# Patient Record
Sex: FEMALE | Race: WHITE | NOT HISPANIC OR LATINO | Employment: PART TIME | URBAN - METROPOLITAN AREA
[De-identification: names, ages, dates, MRNs, and addresses within clinical notes are randomized per-mention and may not be internally consistent; named-entity substitution may affect disease eponyms.]

---

## 2017-01-30 ENCOUNTER — OFFICE VISIT (OUTPATIENT)
Dept: FAMILY MEDICINE | Facility: OTHER | Age: 57
End: 2017-01-30
Payer: COMMERCIAL

## 2017-01-30 ENCOUNTER — TELEPHONE (OUTPATIENT)
Dept: FAMILY MEDICINE | Facility: OTHER | Age: 57
End: 2017-01-30

## 2017-01-30 VITALS
TEMPERATURE: 98.3 F | SYSTOLIC BLOOD PRESSURE: 110 MMHG | HEIGHT: 64 IN | WEIGHT: 120.8 LBS | RESPIRATION RATE: 16 BRPM | BODY MASS INDEX: 20.62 KG/M2 | DIASTOLIC BLOOD PRESSURE: 68 MMHG | OXYGEN SATURATION: 97 % | HEART RATE: 86 BPM

## 2017-01-30 DIAGNOSIS — M25.561 ACUTE PAIN OF RIGHT KNEE: ICD-10-CM

## 2017-01-30 DIAGNOSIS — M54.2 NECK PAIN: ICD-10-CM

## 2017-01-30 DIAGNOSIS — J20.9 ACUTE BRONCHITIS, UNSPECIFIED ORGANISM: Primary | ICD-10-CM

## 2017-01-30 PROCEDURE — 99214 OFFICE O/P EST MOD 30 MIN: CPT | Performed by: PHYSICIAN ASSISTANT

## 2017-01-30 RX ORDER — ACETAMINOPHEN AND CODEINE PHOSPHATE 300; 30 MG/1; MG/1
1-2 TABLET ORAL EVERY 4 HOURS PRN
Qty: 28 TABLET | Refills: 0 | Status: SHIPPED | OUTPATIENT
Start: 2017-01-30 | End: 2017-07-24

## 2017-01-30 RX ORDER — AZITHROMYCIN 250 MG/1
TABLET, FILM COATED ORAL
Qty: 6 TABLET | Refills: 0 | Status: SHIPPED | OUTPATIENT
Start: 2017-01-30 | End: 2017-02-06

## 2017-01-30 RX ORDER — CODEINE PHOSPHATE AND GUAIFENESIN 10; 100 MG/5ML; MG/5ML
1 SOLUTION ORAL EVERY 4 HOURS PRN
Qty: 120 ML | Refills: 0 | Status: SHIPPED | OUTPATIENT
Start: 2017-01-30 | End: 2017-07-24

## 2017-01-30 ASSESSMENT — PAIN SCALES - GENERAL: PAINLEVEL: SEVERE PAIN (7)

## 2017-01-30 NOTE — TELEPHONE ENCOUNTER
Reason for call:  Same Day Appointment  Reason for Call:  Same Day Appointment, Requested Provider:  any    PCP: Robyn Chung    Reason for visit: cold cough    Duration of symptoms: 3 weeks    Have you been treated for this in the past? No    Additional comments: is wondering if she could be worked into [facility] today or tomorrow declined another location    Can we leave a detailed message on this number? YES    Phone number patient can be reached at: Home number on file [phone] (home)     Best Time: any    Call taken on 1/30/2017 at 11:49 AM by Muna vEans

## 2017-01-30 NOTE — MR AVS SNAPSHOT
"              After Visit Summary   2017    Kandice Morton    MRN: [id number]           Patient Information     Date Of Birth          1960        Visit Information        Provider Department      2017 2:30 PM Robyn Chung PA-C [facility]        Today's Diagnoses     Acute bronchitis, unspecified organism    -  1        Follow-ups after your visit        Who to contact     If you have questions or need follow up information about today's clinic visit or your schedule please contact [facility] directly at [phone].  Normal or non-critical lab and imaging results will be communicated to you by Camp[employer], letter or phone within 4 business days after the clinic has received the results. If you do not hear from us within 7 days, please contact the clinic through Camp[employer] or phone. If you have a critical or abnormal lab result, we will notify you by phone as soon as possible.  Submit refill requests through [employer] or call your pharmacy and they will forward the refill request to us. Please allow 3 business days for your refill to be completed.          Additional Information About Your Visit        MyChart Information     [employer] lets you send messages to your doctor, view your test results, renew your prescriptions, schedule appointments and more. To sign up, go to [email or web address] . Click on \"Log in\" on the left side of the screen, which will take you to the Welcome page. Then click on \"Sign up Now\" on the right side of the page.     You will be asked to enter the access code listed below, as well as some personal information. Please follow the directions to create your username and password.     Your access code is: [email or web address]  Expires: 2017  3:03 PM     Your access code will  in 90 days. If you need help or a new code, please call your [facility] or [phone].        Care EveryWhere ID     This is your Care EveryWhere ID. This could be " "used by other organizations to access your [facility] medical records  [id number]        Your Vitals Were     Pulse Temperature Respirations Height BMI (Body Mass Index) Pulse Oximetry    86 98.3  F (36.8  C) (Oral) 16 5' 3.74\" (1.619 m) 20.90 kg/m2 97%    Last Period                   02/01/2006            Blood Pressure from Last 3 Encounters:   01/30/17 110/68   06/13/16 110/74   07/06/15 110/64    Weight from Last 3 Encounters:   01/30/17 120 lb 12.8 oz (54.795 kg)   07/18/16 117 lb (53.071 kg)   06/13/16 116 lb 4.8 oz (52.753 kg)              Today, you had the following     No orders found for display         Today's Medication Changes          These changes are accurate as of: 1/30/17  3:03 PM.  If you have any questions, ask your nurse or doctor.               Start taking these medicines.        Dose/Directions    azithromycin 250 MG tablet   Commonly known as:  ZITHROMAX   Used for:  Acute bronchitis, unspecified organism   Started by:  Robyn Chung PA-C        Two tablets first day, then one tablet daily for four days.   Quantity:  6 tablet   Refills:  0       guaiFENesin-codeine 100-10 MG/5ML Soln solution   Commonly known as:  ROBITUSSIN AC   Used for:  Acute bronchitis, unspecified organism   Started by:  Robyn Chung PA-C        Dose:  1 tsp.   Take 5 mLs by mouth every 4 hours as needed for cough   Quantity:  120 mL   Refills:  0            Where to get your medicines      These medications were sent to [facility] [address] [place] [address]  [address] [place] [address]     Phone:  [phone]    - azithromycin 250 MG tablet      Some of these will need a paper prescription and others can be bought over the counter.  Ask your nurse if you have questions.     Bring a paper prescription for each of these medications    - guaiFENesin-codeine 100-10 MG/5ML Soln solution             Primary Care Provider Office Phone # Fax #    Robyn Chung PA-C " [phone]       [facility] [address] [place] [address]        Thank you!     Thank you for choosing [facility]  for your care. Our goal is always to provide you with excellent care. Hearing back from our patients is one way we can continue to improve our services. Please take a few minutes to complete the written survey that you may receive in the mail after your visit with us. Thank you!             Your Updated Medication List - Protect others around you: Learn how to safely use, store and throw away your medicines at www.disposemymeds.org.          This list is accurate as of: 1/30/17  3:03 PM.  Always use your most recent med list.                   Brand Name Dispense Instructions for use    acetaminophen-codeine 300-30 MG per tablet    TYLENOL w/CODEINE No. 3    28 tablet    Take 1-2 tablets by mouth every 4 hours as needed for mild pain       ASPIRIN NOT PRESCRIBED    INTENTIONAL    1 each    Antiplatelet medication not prescribed intentionally due to Hx of gastrointestinal or intracranial bleed       azithromycin 250 MG tablet    ZITHROMAX    6 tablet    Two tablets first day, then one tablet daily for four days.       guaiFENesin-codeine 100-10 MG/5ML Soln solution    ROBITUSSIN AC    120 mL    Take 5 mLs by mouth every 4 hours as needed for cough

## 2017-01-30 NOTE — PROGRESS NOTES
"  SUBJECTIVE:                                                    Kandice Morton is a 56 year old female who presents to clinic today for the following health issues:      Acute Illness   Acute illness concerns: URI  Onset: 3-4 weeks     Fever: no    Chills/Sweats: YES    Headache (location?): no    Sinus Pressure:no    Conjunctivitis:  no    Ear Pain: no    Rhinorrhea: YES, this has cleared up now , all she really has left per pt is a cough    Congestion: YES    Sore Throat: no     Cough: YES-non-productive- no SOB    Wheeze: no    Decreased Appetite: YES    Nausea: no    Vomiting: no    Diarrhea:  YES- a bit on occasion    Dysuria/Freq.: no    Fatigue/Achiness: no    Sick/Strep Exposure: Grandchildren who were on antibiotics     Therapies Tried and outcome: naproxen, ibuprofen, tylenol      She is also complaining of knee pain, right.  This started a few weeks ago, she is treating her foot pain as recommended by podiatry.  She is wearing better shoes now and wonders if this is why her knee hurts.  She did have a previous injury way back but is not sure if it was her left of right knee that was injured in a skiing accident     Problem list and histories reviewed & adjusted, as indicated.  Additional history: as documented, also she is requesting her tylenol with codiene be refilled she still has several left from her rx in June very rarely uses it for her neck pain.    BP Readings from Last 3 Encounters:   01/30/17 110/68   06/13/16 110/74   07/06/15 110/64    Wt Readings from Last 3 Encounters:   01/30/17 120 lb 12.8 oz (54.795 kg)   07/18/16 117 lb (53.071 kg)   06/13/16 116 lb 4.8 oz (52.753 kg)                  Labs reviewed in [facility]    ROS:  As documented above     OBJECTIVE:                                                    /68 mmHg  Pulse 86  Temp(Src) 98.3  F (36.8  C) (Oral)  Resp 16  Ht 5' 3.74\" (1.619 m)  Wt 120 lb 12.8 oz (54.795 kg)  BMI 20.90 kg/m2  SpO2 97%  LMP 02/01/2006  Body mass " index is 20.9 kg/(m^2).  GENERAL: healthy, alert and no distress  EYES: Eyes grossly normal to inspection  HENT: ear canals and TM's normal, nose and mouth without ulcers or lesions  NECK: no adenopathy, no asymmetry, masses, or scars and thyroid normal to palpation  RESP: lungs clear to auscultation - no rales, rhonchi or wheezes  CV: regular rate and rhythm, normal S1 S2, no S3 or S4, no murmur, click or rub, no peripheral edema and peripheral pulses strong  ABDOMEN: soft, nontender, no hepatosplenomegaly, no masses and bowel sounds normal  MS: no gross musculoskeletal defects noted, no edema    Diagnostic Test Results:  none      ASSESSMENT/PLAN:                                                        1. Acute bronchitis, unspecified organism  Fluids, rest, if not better do CXR though her lungs were clear today so it was not done   - guaiFENesin-codeine (ROBITUSSIN AC) 100-10 MG/5ML SOLN solution; Take 5 mLs by mouth every 4 hours as needed for cough  Dispense: 120 mL; Refill: 0  - azithromycin (ZITHROMAX) 250 MG tablet; Two tablets first day, then one tablet daily for four days.  Dispense: 6 tablet; Refill: 0    2. Neck pain  If needing more frequent refills will need to do narc lisseth, urine test, etc  - acetaminophen-codeine (TYLENOL W/CODEINE NO. 3) 300-30 MG per tablet; Take 1-2 tablets by mouth every 4 hours as needed for mild pain  Dispense: 28 tablet; Refill: 0    3. Acute pain of right knee  Pt will wait to see how her knee adjusts to her new shoes, she thinks her foot pain and knee pain are related, it not improving will refer to sports med           Robyn Chung PA-C  [facility]  Electronically signed by Robyn Chung PA-C

## 2017-01-30 NOTE — NURSING NOTE
"Chief Complaint   Patient presents with     URI       Initial /68 mmHg  Pulse 86  Temp(Src) 98.3  F (36.8  C) (Oral)  Resp 16  Ht 5' 3.74\" (1.619 m)  Wt 120 lb 12.8 oz (54.795 kg)  BMI 20.90 kg/m2  SpO2 97%  LMP 02/01/2006 Estimated body mass index is 20.9 kg/(m^2) as calculated from the following:    Height as of this encounter: 5' 3.74\" (1.619 m).    Weight as of this encounter: 120 lb 12.8 oz (54.795 kg).  BP completed using cuff size: arjun Smith CMA (AAMA)      "

## 2017-02-02 NOTE — PROGRESS NOTES
"  SUBJECTIVE:                                                    Kandice Morton is a 56 year old female who presents to clinic today for the following health issues:      Joint Pain     Onset: about 2-3 weeks , she thinks it may be related to her foot pain, she wore her new expensive shoes and it make both her knee and her right foot pain worse she has returned her shoes and will be seeing the specialist who makes the orthotics soon.    Description:   Location: right knee  Character: constant    Intensity: mild, moderate    Progression of Symptoms: worse    Accompanying Signs & Symptoms:  Other symptoms: radiation of pain down to ankle and can feel a twitching in one spot   History:   Previous similar pain: no       Precipitating factors:   Trauma or overuse: not recent, many years ago had a skiing accident with a knee injury but is not sure what side was injured, also shortly thereafter she feel down the stairs and may have injured her knee at that point but htat was over 25-30 years ago.    Alleviating factors:  Improved by: nothing       Therapies Tried and outcome: ice pack, naproxen, got a pair of shoes          Problem list and histories reviewed & adjusted, as indicated.  Additional history: as documented    BP Readings from Last 3 Encounters:   02/06/17 106/66   01/30/17 110/68   06/13/16 110/74    Wt Readings from Last 3 Encounters:   02/06/17 124 lb (56.246 kg)   01/30/17 120 lb 12.8 oz (54.795 kg)   07/18/16 117 lb (53.071 kg)                  Labs reviewed in EPIC    ROS:  HEENT and lungs- her cough is mildly improved with use of z pack    OBJECTIVE:                                                    /66 mmHg  Pulse 84  Temp(Src) 98.1  F (36.7  C) (Oral)  Resp 12  Ht 5' 3.74\" (1.619 m)  Wt 124 lb (56.246 kg)  BMI 21.46 kg/m2  LMP 02/01/2006  Body mass index is 21.46 kg/(m^2).  GENERAL: healthy, alert and no distress  RESP: lungs clear to auscultation - no rales, rhonchi or wheezes  CV: " regular rate and rhythm, normal S1 S2, no S3 or S4, no murmur, click or rub, no peripheral edema and peripheral pulses strong  MS: no gross musculoskeletal defects noted, mild lateral joint line area of edema, no joint effusion noted, very tender over lateral joint line, otherwise no area of bony point tenderness noted, FROM without crepitus     Diagnostic Test Results:  Xray - no obvious signs of fracture , maybe very mild medial joint space narrowing though subtle , rad report pending      ASSESSMENT/PLAN:                                                            1. Acute pain of right knee  She will ice, use her t#3 and naproxen as needed, if not improved once she addresses her foot pain she may call to see sports med   - XR Knee Right 3 Views; Future    2. Special screening for malignant neoplasms, colon    - GASTROENTEROLOGY ADULT REF PROCEDURE ONLY    3. Tobacco use disorder    - TOBACCO CESSATION - FOR HEALTH MAINTENANCE        Robyn Chung PA-C  [facility]  Electronically signed by Robyn Chung PA-C

## 2017-02-06 ENCOUNTER — OFFICE VISIT (OUTPATIENT)
Dept: FAMILY MEDICINE | Facility: OTHER | Age: 57
End: 2017-02-06
Payer: COMMERCIAL

## 2017-02-06 ENCOUNTER — RADIANT APPOINTMENT (OUTPATIENT)
Dept: GENERAL RADIOLOGY | Facility: OTHER | Age: 57
End: 2017-02-06
Attending: PHYSICIAN ASSISTANT
Payer: COMMERCIAL

## 2017-02-06 ENCOUNTER — TELEPHONE (OUTPATIENT)
Dept: FAMILY MEDICINE | Facility: OTHER | Age: 57
End: 2017-02-06

## 2017-02-06 VITALS
HEART RATE: 84 BPM | HEIGHT: 64 IN | SYSTOLIC BLOOD PRESSURE: 106 MMHG | BODY MASS INDEX: 21.17 KG/M2 | DIASTOLIC BLOOD PRESSURE: 66 MMHG | TEMPERATURE: 98.1 F | RESPIRATION RATE: 12 BRPM | WEIGHT: 124 LBS

## 2017-02-06 DIAGNOSIS — F17.200 TOBACCO USE DISORDER: Primary | ICD-10-CM

## 2017-02-06 DIAGNOSIS — Z12.11 SPECIAL SCREENING FOR MALIGNANT NEOPLASMS, COLON: ICD-10-CM

## 2017-02-06 DIAGNOSIS — M25.561 ACUTE PAIN OF RIGHT KNEE: ICD-10-CM

## 2017-02-06 PROCEDURE — 99213 OFFICE O/P EST LOW 20 MIN: CPT | Performed by: PHYSICIAN ASSISTANT

## 2017-02-06 PROCEDURE — 73562 X-RAY EXAM OF KNEE 3: CPT | Mod: RT

## 2017-02-06 ASSESSMENT — PAIN SCALES - GENERAL: PAINLEVEL: MODERATE PAIN (5)

## 2017-02-06 NOTE — TELEPHONE ENCOUNTER
Patient has been scheduled for her colonoscopy 3/8/17 at 11:30am with Dr. Winchester. Packet mailed.

## 2017-02-06 NOTE — MR AVS SNAPSHOT
After Visit Summary   2/6/2017    Kandice Morton    MRN: [id number]           Patient Information     Date Of Birth          1960        Visit Information        Provider Department      2/6/2017 10:00 AM Robyn Chung PA-C [facility]        Today's Diagnoses     Tobacco use disorder    -  1     Acute pain of right knee         Special screening for malignant neoplasms, colon            Follow-ups after your visit        Additional Services     GASTROENTEROLOGY ADULT REF PROCEDURE ONLY       Last Lab Result: No results found for: CR  Body mass index is 21.46 kg/(m^2).     Needed:  No  Language:  English    Patient will be contacted to schedule procedure.     Please be aware that coverage of these services is subject to the terms and limitations of your health insurance plan.  Call member services at your health plan with any benefit or coverage questions.  Any procedures must be performed at a [facility] facility OR coordinated by your clinic's referral office.    Please bring the following with you to your appointment:    (1) Any X-Rays, CTs or MRIs which have been performed.  Contact the facility where they were done to arrange for  prior to your scheduled appointment.    (2) List of current medications   (3) This referral request   (4) Any documents/labs given to you for this referral                  Who to contact     If you have questions or need follow up information about today's clinic visit or your schedule please contact [facility] [place] directly at [phone].  Normal or non-critical lab and imaging results will be communicated to you by MyChart, letter or phone within 4 business days after the clinic has received the results. If you do not hear from us within 7 days, please contact the clinic through MyChart or phone. If you have a critical or abnormal lab result, we will notify you by phone as soon as possible.  Submit refill requests  "through [facility] or call your pharmacy and they will forward the refill request to us. Please allow 3 business days for your refill to be completed.          Additional Information About Your Visit        Syn[employer]ohart Information     [facility] lets you send messages to your doctor, view your test results, renew your prescriptions, schedule appointments and more. To sign up, go to [email or web address] . Click on \"Log in\" on the left side of the screen, which will take you to the Welcome page. Then click on \"Sign up Now\" on the right side of the page.     You will be asked to enter the access code listed below, as well as some personal information. Please follow the directions to create your username and password.     Your access code is: [email or web address]  Expires: 2017  3:03 PM     Your access code will  in 90 days. If you need help or a new code, please call your [place] [facility] clinic or [phone].        Care EveryWhere ID     This is your Care EveryWhere ID. This could be used by other organizations to access your [place] medical records  [id number]        Your Vitals Were     Pulse Temperature Respirations Height BMI (Body Mass Index) Last Period    84 98.1  F (36.7  C) (Oral) 12 5' 3.74\" (1.619 m) 21.46 kg/m2 2006       Blood Pressure from Last 3 Encounters:   17 106/66   17 110/68   16 110/74    Weight from Last 3 Encounters:   17 124 lb (56.246 kg)   17 120 lb 12.8 oz (54.795 kg)   16 117 lb (53.071 kg)              We Performed the Following     GASTROENTEROLOGY ADULT REF PROCEDURE ONLY     TOBACCO CESSATION - FOR HEALTH MAINTENANCE        Primary Care Provider Office Phone # Fax #    Robyn Chung PA-C [phone]       [facility] [address] [place] [address]        Thank you!     Thank you for choosing [facility]  for your care. Our goal is always to provide you with excellent care. Hearing back from our " patients is one way we can continue to improve our services. Please take a few minutes to complete the written survey that you may receive in the mail after your visit with us. Thank you!             Your Updated Medication List - Protect others around you: Learn how to safely use, store and throw away your medicines at www.disposemymeds.org.          This list is accurate as of: 2/6/17 11:01 AM.  Always use your most recent med list.                   Brand Name Dispense Instructions for use    acetaminophen-codeine 300-30 MG per tablet    TYLENOL w/CODEINE No. 3    28 tablet    Take 1-2 tablets by mouth every 4 hours as needed for mild pain       ASPIRIN NOT PRESCRIBED    INTENTIONAL    1 each    Antiplatelet medication not prescribed intentionally due to Hx of gastrointestinal or intracranial bleed       guaiFENesin-codeine 100-10 MG/5ML Soln solution    ROBITUSSIN AC    120 mL    Take 5 mLs by mouth every 4 hours as needed for cough       NAPROXEN PO      Take by mouth daily as needed for moderate pain

## 2017-02-06 NOTE — NURSING NOTE
"Chief Complaint   Patient presents with     Knee Pain     Right knee     Panel Management     MyChart, Flu shot, FIT test, Honoring choices, Tobacco cessation       Initial /66 mmHg  Pulse 84  Temp(Src) 98.1  F (36.7  C) (Oral)  Resp 12  Ht 5' 3.74\" (1.619 m)  Wt 124 lb (56.246 kg)  BMI 21.46 kg/m2  LMP 02/01/2006 Estimated body mass index is 21.46 kg/(m^2) as calculated from the following:    Height as of this encounter: 5' 3.74\" (1.619 m).    Weight as of this encounter: 124 lb (56.246 kg).  Medication Reconciliation: complete     Pauline Busch CMA (AAMA)      "

## 2017-03-08 ENCOUNTER — SURGERY (OUTPATIENT)
Age: 57
End: 2017-03-08

## 2017-03-08 ENCOUNTER — HOSPITAL ENCOUNTER (OUTPATIENT)
Facility: CLINIC | Age: 57
Discharge: HOME | End: 2017-03-08
Attending: INTERNAL MEDICINE | Admitting: INTERNAL MEDICINE
Payer: COMMERCIAL

## 2017-03-08 VITALS
BODY MASS INDEX: 21.46 KG/M2 | DIASTOLIC BLOOD PRESSURE: 85 MMHG | RESPIRATION RATE: 20 BRPM | WEIGHT: 124 LBS | SYSTOLIC BLOOD PRESSURE: 116 MMHG | TEMPERATURE: 98.4 F | OXYGEN SATURATION: 97 %

## 2017-03-08 LAB — COLONOSCOPY: NORMAL

## 2017-03-08 PROCEDURE — 88305 TISSUE EXAM BY PATHOLOGIST: CPT | Performed by: INTERNAL MEDICINE

## 2017-03-08 PROCEDURE — 25000125 ZZHC RX 250: Performed by: INTERNAL MEDICINE

## 2017-03-08 PROCEDURE — 88305 TISSUE EXAM BY PATHOLOGIST: CPT | Mod: 26 | Performed by: INTERNAL MEDICINE

## 2017-03-08 PROCEDURE — 45385 COLONOSCOPY W/LESION REMOVAL: CPT | Performed by: INTERNAL MEDICINE

## 2017-03-08 PROCEDURE — 25000128 H RX IP 250 OP 636: Performed by: INTERNAL MEDICINE

## 2017-03-08 PROCEDURE — 45381 COLONOSCOPY SUBMUCOUS NJX: CPT | Performed by: INTERNAL MEDICINE

## 2017-03-08 PROCEDURE — 40000297 ZZH STATISTIC ENDO RECOVERY CLASS 1:2 EACH ADDL HOUR: Performed by: INTERNAL MEDICINE

## 2017-03-08 PROCEDURE — 40000296 ZZH STATISTIC ENDO RECOVERY CLASS 1:2 FIRST HOUR: Performed by: INTERNAL MEDICINE

## 2017-03-08 RX ORDER — FENTANYL CITRATE 50 UG/ML
INJECTION, SOLUTION INTRAMUSCULAR; INTRAVENOUS PRN
Status: DISCONTINUED | OUTPATIENT
Start: 2017-03-08 | End: 2017-03-08 | Stop reason: HOSPADM

## 2017-03-08 RX ORDER — LIDOCAINE 40 MG/G
CREAM TOPICAL
Status: DISCONTINUED | OUTPATIENT
Start: 2017-03-08 | End: 2017-03-08 | Stop reason: HOSPADM

## 2017-03-08 RX ORDER — ONDANSETRON 2 MG/ML
4 INJECTION INTRAMUSCULAR; INTRAVENOUS
Status: DISCONTINUED | OUTPATIENT
Start: 2017-03-08 | End: 2017-03-08 | Stop reason: HOSPADM

## 2017-03-08 RX ADMIN — FENTANYL CITRATE 50 MCG: 50 INJECTION, SOLUTION INTRAMUSCULAR; INTRAVENOUS at 11:28

## 2017-03-08 RX ADMIN — MIDAZOLAM HYDROCHLORIDE 1 MG: 1 INJECTION, SOLUTION INTRAMUSCULAR; INTRAVENOUS at 11:41

## 2017-03-08 RX ADMIN — MIDAZOLAM HYDROCHLORIDE 1 MG: 1 INJECTION, SOLUTION INTRAMUSCULAR; INTRAVENOUS at 11:30

## 2017-03-08 RX ADMIN — MIDAZOLAM HYDROCHLORIDE 1 MG: 1 INJECTION, SOLUTION INTRAMUSCULAR; INTRAVENOUS at 11:31

## 2017-03-08 RX ADMIN — MIDAZOLAM HYDROCHLORIDE 1 MG: 1 INJECTION, SOLUTION INTRAMUSCULAR; INTRAVENOUS at 11:29

## 2017-03-08 RX ADMIN — MIDAZOLAM HYDROCHLORIDE 2 MG: 1 INJECTION, SOLUTION INTRAMUSCULAR; INTRAVENOUS at 11:28

## 2017-03-08 RX ADMIN — FENTANYL CITRATE 25 MCG: 50 INJECTION, SOLUTION INTRAMUSCULAR; INTRAVENOUS at 11:35

## 2017-03-08 RX ADMIN — MIDAZOLAM HYDROCHLORIDE 2 MG: 1 INJECTION, SOLUTION INTRAMUSCULAR; INTRAVENOUS at 11:39

## 2017-03-08 RX ADMIN — FENTANYL CITRATE 25 MCG: 50 INJECTION, SOLUTION INTRAMUSCULAR; INTRAVENOUS at 11:34

## 2017-03-08 RX ADMIN — LIDOCAINE HYDROCHLORIDE 1 ML: 10 INJECTION, SOLUTION EPIDURAL; INFILTRATION; INTRACAUDAL; PERINEURAL at 11:08

## 2017-03-08 NOTE — CONSULTS
[facility] GI Pre-Procedure Physical Assessment    Kandice Morton MRN# [id number]   Age: 57 year old YOB: 1960      Date of Surgery: 3/8/2017  Location [facility]      Date of Exam 3/8/2017 Facility (Same day)       Home clinic: [facility]  Primary care provider: Robyn Chung         Active problem list:   Patient Active Problem List   Diagnosis     Tobacco use disorder     CARDIOVASCULAR SCREENING; LDL GOAL LESS THAN 130     Neck pain            Medications (include herbals and vitamins):   Any Plavix use in the last 7 days?  No     Current Facility-Administered Medications   Medication     lidocaine 1 % 1 mL     lidocaine (LMX4) kit     sodium chloride (PF) 0.9% PF flush 3 mL     sodium chloride (PF) 0.9% PF flush 3 mL     sodium chloride (PF) 0.9% PF flush 3 mL     ondansetron (ZOFRAN) injection 4 mg             Allergies:      Allergies   Allergen Reactions     Sulfa Drugs      Allergy to Latex?  No  Allergy to tape?    No          Social History:     Social History   Substance Use Topics     Smoking status: Current Every Day Smoker     Packs/day: 1.00     Years: 25.00     Types: Cigarettes     Smokeless tobacco: Never Used     Alcohol use Yes      Comment: occasionally            Physical Exam:   All vitals have been reviewed  Blood pressure 116/87, temperature 98.4  F (36.9  C), temperature source Oral, resp. rate 14, weight 56.2 kg (124 lb), last menstrual period 02/01/2006, SpO2 100 %, not currently breastfeeding.  Airway assessment:   Patient is able to open mouth wide  Patient is able to stick out tongue      Lungs:   No increased work of breathing, good air exchange, clear to auscultation bilaterally, no crackles or wheezing      Cardiovascular:   Normal apical impulse, regular rate and rhythm, normal S1 and S2, no S3 or S4, and no murmur noted           Lab / Radiology Results:   All laboratory data reviewed          Assessment:   Appropriately  NPO  Chief complaint or anatomic assessment of involved area: screening         Plan:   Moderate (conscious) sedation     Patient's active problems diagnostically and therapeutically optimized for the planned procedure  Risks, benefits, alternatives to sedation and blood explained and consent obtained  Risks, benefits, alternatives to procedure explained and consent obtained  Orders and progress notes are in the chart  Discharge from Phase 1 and / or Phase 2 recovery when patient meets criteria    I have reviewed the history and physical, lab finding(s), diagnostic data, medicaitons, and the plan for sedation.  I have determined this patient to be an appropriate candidate for the planned sedation / procedure and have reassessed the patient immediately prior to sedation / procedure.    I have personally and medically directed the administration of medications used.    Dewey Winchester MD

## 2017-03-13 LAB — COPATH REPORT: NORMAL

## 2017-07-21 NOTE — PROGRESS NOTES
SUBJECTIVE:   CC: Kandice Morton is an 57 year old woman who presents for preventive health visit.     Healthy Habits:    Do you get at least three servings of calcium containing foods daily (dairy, green leafy vegetables, etc.)? no, taking calcium and/or vitamin D supplement: no    Amount of exercise or daily activities, outside of work: none outside of work     Problems taking medications regularly No    Medication side effects: No    Have you had an eye exam in the past two years? yes    Do you see a dentist twice per year? yes    Do you have sleep apnea, excessive snoring or daytime drowsiness?no      She did have colonoscopy had some adenomatous polyps she is due in 1 year per pt , I do not have this supporting documention though I did update HM accordingly.    Today's PHQ-2 Score:   PHQ-2 ( 1999 Pf[employer]) 1/30/2017 7/6/2015   Q1: Little interest or pleasure in doing things 0 0   Q2: Feeling down, depressed or hopeless 0 0   PHQ-2 Score 0 0         Abuse: Current or Past(Physical, Sexual or Emotional)- No  Do you feel safe in your environment - Yes  Social History   Substance Use Topics     Smoking status: Current Every Day Smoker     Packs/day: 1.00     Years: 25.00     Types: Cigarettes     Smokeless tobacco: Never Used     Alcohol use Yes      Comment: occasionally     The patient does not drink >3 drinks per day nor >7 drinks per week.    Reviewed orders with patient.  Reviewed health maintenance and updated orders accordingly - Yes  Labs reviewed in EPIC  BP Readings from Last 3 Encounters:   07/24/17 118/74   03/08/17 116/85   02/06/17 106/66    Wt Readings from Last 3 Encounters:   07/24/17 117 lb 3.2 oz (53.2 kg)   03/08/17 124 lb (56.2 kg)   02/06/17 124 lb (56.2 kg)                Patient over age 50, mutual decision to screen reflected in health maintenance.      Pertinent mammograms are reviewed under the imaging tab.  History of abnormal Pap smear:   NO - age 30-65 PAP every 5 years with  "negative HPV co-testing recommended  Last 3 Pap Results:   PAP (no units)   Date Value   06/13/2016 NIL   04/16/2012 NIL   11/25/2008 NIL       Reviewed and updated as needed this visit by clinical staff         Reviewed and updated as needed this visit by Provider          ROS:  C: NEGATIVE for fever, chills, change in weight  INTEGUMENTARY/SKIN: she did develop a rash after drinking a beer called \" spotted cow\"  It resolved on its own though was very itchy   E: NEGATIVE for vision changes or irritation  ENT: has always had a phlegmy cough, this is unchanged but felt like her throat was burning or had a film on it starting at the 4th of July.  No fever, chills, white spots or feeling of being ill.  It is feeling somewhat better now.  She is a smoker, uses an ecig to try quit, no recent abx.  Her taste was not affected by the feeling in her throat.    RESP:typical unchanged phlegmy cough  B: NEGATIVE for masses, tenderness or discharge  CV: NEGATIVE for chest pain, palpitations or peripheral edema  GI: NEGATIVE for nausea, abdominal pain, heartburn, or change in bowel habits  : NEGATIVE for unusual urinary or vaginal symptoms. No vaginal bleeding.  M: NEGATIVE for significant arthralgias or myalgia, neck pain is stable uses t#3 on occasion  N: NEGATIVE for weakness, dizziness or paresthesias  P: NEGATIVE for changes in mood or affect     OBJECTIVE:   LMP 02/01/2006  EXAM:  GENERAL: healthy, alert and no distress  EYES: Eyes grossly normal to inspection, PERRL and conjunctivae and sclerae normal  HENT: ear canals and TM's normal, nose and mouth without ulcers or lesions  HENT: Posterior pharynx is erythematous no exudate or white patches noted orally , no masses   NECK: no adenopathy, no asymmetry, masses, or scars and thyroid normal to palpation  RESP: lungs clear to auscultation - no rales, rhonchi or wheezes  BREAST: normal without masses, tenderness or nipple discharge and no palpable axillary masses or " "adenopathy  CV: regular rate and rhythm, normal S1 S2, no S3 or S4, no murmur, click or rub, no peripheral edema and peripheral pulses strong  ABDOMEN: soft, nontender, no hepatosplenomegaly, no masses and bowel sounds normal  MS: no gross musculoskeletal defects noted, no edema  SKIN: no suspicious lesions or rashes  NEURO: Normal strength and tone, mentation intact and speech normal  PSYCH: mentation appears normal, affect normal/bright    ASSESSMENT/PLAN:   1. Encounter for routine adult health examination without abnormal findings  Needs to quit smoking she is using e cig to accomplish this slwoly over time     2. History of colonic polyps  Plans for recheck in 1 year     3. Neck pain  Stable rare appropriate use   - acetaminophen-codeine (TYLENOL W/CODEINE NO. 3) 300-30 MG per tablet; Take 1-2 tablets by mouth every 4 hours as needed for mild pain  Dispense: 28 tablet; Refill: 0    4. Encounter for screening mammogram for breast cancer  appt made for pt   - *MA Screening Digital Bilateral; Future    5. Throat pain  Trial of diflucan, if not entirely resolved consider upper gi endo  - fluconazole (DIFLUCAN) 150 MG tablet; Take 1 tablet (150 mg) by mouth every 3 days  Dispense: 1 tablet; Refill: 0    COUNSELING:   Reviewed preventive health counseling, as reflected in patient instructions    BP Screening:   Last 3 BP Readings:    BP Readings from Last 3 Encounters:   07/24/17 118/74   03/08/17 116/85   02/06/17 106/66       The following was recommended to the patient:  Re-screen BP within a year and recommended lifestyle modifications     reports that she has been smoking Cigarettes.  She has a 25.00 pack-year smoking history. She has never used smokeless tobacco.  Tobacco Cessation Action Plan: Information offered: Patient not interested at this time  Estimated body mass index is 21.46 kg/(m^2) as calculated from the following:    Height as of 2/6/17: 5' 3.74\" (1.619 m).    Weight as of 3/8/17: 124 lb (56.2 " kg).         Counseling Resources:  ATP IV Guidelines  Pooled Cohorts Equation Calculator  Breast Cancer Risk Calculator  FRAX Risk Assessment  ICSI Preventive Guidelines  Dietary Guidelines for Americans, 2010  USDA's MyPlate  ASA Prophylaxis  Lung CA Screening    Robyn Chung PA-C  [facility] HospitalElectronically signed by Robyn Chung PA-C

## 2017-07-24 ENCOUNTER — OFFICE VISIT (OUTPATIENT)
Dept: FAMILY MEDICINE | Facility: OTHER | Age: 57
End: 2017-07-24
Payer: COMMERCIAL

## 2017-07-24 VITALS
HEART RATE: 64 BPM | TEMPERATURE: 98.2 F | DIASTOLIC BLOOD PRESSURE: 74 MMHG | BODY MASS INDEX: 20.01 KG/M2 | SYSTOLIC BLOOD PRESSURE: 118 MMHG | WEIGHT: 117.2 LBS | RESPIRATION RATE: 12 BRPM | HEIGHT: 64 IN

## 2017-07-24 DIAGNOSIS — Z86.0100 HISTORY OF COLONIC POLYPS: ICD-10-CM

## 2017-07-24 DIAGNOSIS — Z00.00 ENCOUNTER FOR ROUTINE ADULT HEALTH EXAMINATION WITHOUT ABNORMAL FINDINGS: Primary | ICD-10-CM

## 2017-07-24 DIAGNOSIS — M54.2 NECK PAIN: ICD-10-CM

## 2017-07-24 DIAGNOSIS — Z12.31 ENCOUNTER FOR SCREENING MAMMOGRAM FOR BREAST CANCER: ICD-10-CM

## 2017-07-24 DIAGNOSIS — R07.0 THROAT PAIN: ICD-10-CM

## 2017-07-24 PROCEDURE — 99396 PREV VISIT EST AGE 40-64: CPT | Performed by: PHYSICIAN ASSISTANT

## 2017-07-24 RX ORDER — FLUCONAZOLE 150 MG/1
150 TABLET ORAL
Qty: 1 TABLET | Refills: 0 | Status: SHIPPED | OUTPATIENT
Start: 2017-07-24 | End: 2017-11-10

## 2017-07-24 RX ORDER — ACETAMINOPHEN AND CODEINE PHOSPHATE 300; 30 MG/1; MG/1
1-2 TABLET ORAL EVERY 4 HOURS PRN
Qty: 28 TABLET | Refills: 0 | Status: SHIPPED | OUTPATIENT
Start: 2017-07-24 | End: 2017-11-10

## 2017-07-24 ASSESSMENT — PAIN SCALES - GENERAL: PAINLEVEL: NO PAIN (0)

## 2017-07-24 NOTE — NURSING NOTE
"Chief Complaint   Patient presents with     Physical     Panel Management     FIT, honoring chuckie, tatiana        Initial /74  Pulse 64  Temp 98.2  F (36.8  C) (Temporal)  Resp 12  Ht 5' 4\" (1.626 m)  Wt 117 lb 3.2 oz (53.2 kg)  LMP 02/01/2006  Breastfeeding? No  BMI 20.12 kg/m2 Estimated body mass index is 20.12 kg/(m^2) as calculated from the following:    Height as of this encounter: 5' 4\" (1.626 m).    Weight as of this encounter: 117 lb 3.2 oz (53.2 kg).  Medication Reconciliation: complete     Serina David MA    "

## 2017-07-24 NOTE — MR AVS SNAPSHOT
After Visit Summary   7/24/2017    Kandice Morton    MRN: [id number]           Patient Information     Date Of Birth          1960        Visit Information        Provider Department      7/24/2017 10:30 AM Robyn Chung PA-C [facility]        Today's Diagnoses     Encounter for routine adult health examination without abnormal findings    -  1    History of colonic polyps        Neck pain        Encounter for screening mammogram for breast cancer          Care Instructions      Preventive Health Recommendations  Female Ages 50 - 64    Yearly exam: See your health care provider every year in order to  o Review health changes.   o Discuss preventive care.    o Review your medicines if your doctor has prescribed any.      Get a Pap test every three years (unless you have an abnormal result and your provider advises testing more often).    If you get Pap tests with HPV test, you only need to test every 5 years, unless you have an abnormal result.     You do not need a Pap test if your uterus was removed (hysterectomy) and you have not had cancer.    You should be tested each year for STDs (sexually transmitted diseases) if you're at risk.     Have a mammogram every 1 to 2 years.    Have a colonoscopy at age 50, or have a yearly FIT test (stool test). These exams screen for colon cancer.      Have a cholesterol test every 5 years, or more often if advised.    Have a diabetes test (fasting glucose) every three years. If you are at risk for diabetes, you should have this test more often.     If you are at risk for osteoporosis (brittle bone disease), think about having a bone density scan (DEXA).    Shots: Get a flu shot each year. Get a tetanus shot every 10 years.    Nutrition:     Eat at least 5 servings of fruits and vegetables each day.    Eat whole-grain bread, whole-wheat pasta and brown rice instead of white grains and rice.    Talk to your provider about Calcium and  Vitamin D.     Lifestyle    Exercise at least 150 minutes a week (30 minutes a day, 5 days a week). This will help you control your weight and prevent disease.    Limit alcohol to one drink per day.    No smoking.     Wear sunscreen to prevent skin cancer.     See your dentist every six months for an exam and cleaning.    See your eye doctor every 1 to 2 years.            Follow-ups after your visit        Your next 10 appointments already scheduled     Jul 26, 2017  1:45 PM CDT   MA SCREENING DIGITAL BILATERAL with PHMA1   [facility] [place] [facility] ([facility])    [address] [place] [id number] [phone]           Do not use any powder, lotion or deodorant under your arms or on your breast. If you do, we will ask you to remove it before your exam.  Wear comfortable, two-piece clothing.  If you have any allergies, tell your care team.  Bring any previous mammograms from other facilities or have them mailed to the breast center.              Future tests that were ordered for you today     Open Future Orders        Priority Expected Expires Ordered    *MA Screening Digital Bilateral Routine  7/24/2018 7/24/2017            Who to contact     If you have questions or need follow up information about today's clinic visit or your schedule please contact [facility] directly at [phone].  Normal or non-critical lab and imaging results will be communicated to you by MyChart, letter or phone within 4 business days after the clinic has received the results. If you do not hear from us within 7 days, please contact the clinic through MyChart or phone. If you have a critical or abnormal lab result, we will notify you by phone as soon as possible.  Submit refill requests through [employer] or call your pharmacy and they will forward the refill request to us. Please allow 3 business days for your refill to be completed.          Additional Information About Your Visit       "  MyChart Information     [employer] lets you send messages to your doctor, view your test results, renew your prescriptions, schedule appointments and more. To sign up, go to [email or web address] . Click on \"Log in\" on the left side of the screen, which will take you to the Welcome page. Then click on \"Sign up Now\" on the right side of the page.     You will be asked to enter the access code listed below, as well as some personal information. Please follow the directions to create your username and password.     Your access code is: [email or web address]  Expires: 10/22/2017 11:02 AM     Your access code will  in 90 days. If you need help or a new code, please call your [place] clinic or [phone].        Care EveryWhere ID     This is your Care EveryWhere ID. This could be used by other organizations to access your [place] medical records  [id number]        Your Vitals Were     Pulse Temperature Respirations Height Last Period Breastfeeding?    64 98.2  F (36.8  C) (Temporal) 12 5' 4\" (1.626 m) 2006 No    BMI (Body Mass Index)                   20.12 kg/m2            Blood Pressure from Last 3 Encounters:   17 118/74   17 116/85   17 106/66    Weight from Last 3 Encounters:   17 117 lb 3.2 oz (53.2 kg)   17 124 lb (56.2 kg)   17 124 lb (56.2 kg)                 Where to get your medicines      Some of these will need a paper prescription and others can be bought over the counter.  Ask your nurse if you have questions.     Bring a paper prescription for each of these medications     acetaminophen-codeine 300-30 MG per tablet          Primary Care Provider Office Phone # Fax #    Robyn Chung PA-C [phone]       [facility] [address] [place] [address]        Equal Access to Services     ESTEFANÍA FLEMING AH: Navin Eduardo, edinson london, crystal munson'aan ah. So [facility] " [phone].    ATENCIÓN: Si andrez herrera, tiene a castanon disposición servicios gratuitos de asistencia lingüística. Parag mckeon [phone].    We comply with applicable federal civil rights laws and [place] laws. We do not discriminate on the basis of race, color, national origin, age, disability sex, sexual orientation or gender identity.            Thank you!     Thank you for choosing [facility]  for your care. Our goal is always to provide you with excellent care. Hearing back from our patients is one way we can continue to improve our services. Please take a few minutes to complete the written survey that you may receive in the mail after your visit with us. Thank you!             Your Updated Medication List - Protect others around you: Learn how to safely use, store and throw away your medicines at www.disposemymeds.org.          This list is accurate as of: 7/24/17 11:02 AM.  Always use your most recent med list.                   Brand Name Dispense Instructions for use Diagnosis    acetaminophen-codeine 300-30 MG per tablet    TYLENOL w/CODEINE No. 3    28 tablet    Take 1-2 tablets by mouth every 4 hours as needed for mild pain    Neck pain       ASPIRIN NOT PRESCRIBED    INTENTIONAL    1 each    Antiplatelet medication not prescribed intentionally due to Hx of gastrointestinal or intracranial bleed    CARDIOVASCULAR SCREENING; LDL GOAL LESS THAN 130       NAPROXEN PO      Take by mouth daily as needed for moderate pain

## 2017-07-26 ENCOUNTER — HOSPITAL ENCOUNTER (OUTPATIENT)
Dept: MAMMOGRAPHY | Facility: CLINIC | Age: 57
Discharge: HOME | End: 2017-07-26
Attending: PHYSICIAN ASSISTANT | Admitting: PHYSICIAN ASSISTANT
Payer: COMMERCIAL

## 2017-07-26 DIAGNOSIS — Z12.31 VISIT FOR SCREENING MAMMOGRAM: ICD-10-CM

## 2017-07-26 PROCEDURE — G0202 SCR MAMMO BI INCL CAD: HCPCS

## 2017-11-10 ENCOUNTER — OFFICE VISIT (OUTPATIENT)
Dept: ORTHOPEDICS | Facility: OTHER | Age: 57
End: 2017-11-10
Payer: COMMERCIAL

## 2017-11-10 VITALS
DIASTOLIC BLOOD PRESSURE: 72 MMHG | SYSTOLIC BLOOD PRESSURE: 112 MMHG | HEIGHT: 64 IN | WEIGHT: 119.6 LBS | BODY MASS INDEX: 20.42 KG/M2

## 2017-11-10 DIAGNOSIS — M54.2 NECK PAIN: ICD-10-CM

## 2017-11-10 DIAGNOSIS — G89.29 CHRONIC PAIN OF RIGHT KNEE: Primary | ICD-10-CM

## 2017-11-10 DIAGNOSIS — M25.561 CHRONIC PAIN OF RIGHT KNEE: Primary | ICD-10-CM

## 2017-11-10 PROCEDURE — 99244 OFF/OP CNSLTJ NEW/EST MOD 40: CPT | Performed by: PHYSICAL MEDICINE & REHABILITATION

## 2017-11-10 RX ORDER — MELOXICAM 15 MG/1
15 TABLET ORAL DAILY PRN
Qty: 30 TABLET | Refills: 0 | Status: SHIPPED | OUTPATIENT
Start: 2017-11-10 | End: 2019-07-10

## 2017-11-10 RX ORDER — ACETAMINOPHEN AND CODEINE PHOSPHATE 300; 30 MG/1; MG/1
1-2 TABLET ORAL EVERY 4 HOURS PRN
Qty: 28 TABLET | Refills: 0 | Status: SHIPPED | OUTPATIENT
Start: 2017-11-10 | End: 2018-09-19

## 2017-11-10 NOTE — NURSING NOTE
"Chief Complaint   Patient presents with     Knee right       Initial /72  Ht 5' 4\" (1.626 m)  Wt 119 lb 9.6 oz (54.3 kg)  LMP 02/01/2006  BMI 20.53 kg/m2 Estimated body mass index is 20.53 kg/(m^2) as calculated from the following:    Height as of this encounter: 5' 4\" (1.626 m).    Weight as of this encounter: 119 lb 9.6 oz (54.3 kg).  Medication Reconciliation: complete    Shilpa Mendenhall M.Ed., ATR, ATC    "

## 2017-11-10 NOTE — PROGRESS NOTES
"Sports Medicine Clinic Visit    PCP: Robyn Chung    CC: Patient presents with:  Knee right      HPI:  Kandice Morton is a 57 year old female who is seen in consultation at the request of Robyn Chung PA-C.   She notes right knee pain that began in February with insdious onset. She had knee pain prior to that however in February it was becoming \"annoying\".  She has bouts of excruciating pain. She rates the pain at a 8/10 at its worst and a 1/10 currently. Symptoms are relieved with Tylenol 3, OTC shoe inserts (she has custom ones but they made her knee pain worse), naproxen, and ice temporarily.  Symptoms are worsened by nothing specific. She endorses a lump on the outside of her knee and cracking.  She denies swelling, bruising, popping, grinding, catching, locking, instability, numbness, tingling, weakness, pain in other joints and fever, chills. Other treatment has included nothing.     Review of Systems:  Musculoskeletal: as above  Remainder of review of systems is negative including constitutional, eyes, ENT, CV, pulmonary, GI, , endocrine, skin, hematologic, and neurologic except as noted in HPI or medical history.    History reviewed. No pertinent past surgical/medical/family/social history other than as mentioned in HPI.    Past Medical History:   Diagnosis Date     Genital herpes, unspecified      Subdural hemorrhage following injury, without mention of open intracranial wound, unspecified state of consciousness 1985     Tobacco use disorder      TRAUMATIC SUBDURAL HEMORRH 12/27/2006     Past Surgical History:   Procedure Laterality Date     C OPEN SKULL EVAC HEMATOMA, SUPRATENTORIAL, SUB/ EXTRADURAL  1985     Family History   Problem Relation Age of Onset     DIABETES Mother      Type II     Neurologic Disorder Mother      Migraines     Genitourinary Problems Mother      Colitis- ulcerative     Hypertension Father      DIABETES Father      Type II     CANCER Father      lung     Social History " "    Social History     Marital status:      Spouse name: Prudencio     Number of children: 3     Years of education: 13     Occupational History           Social History Main Topics     Smoking status: Current Every Day Smoker     Packs/day: 1.00     Years: 25.00     Types: Cigarettes     Smokeless tobacco: Never Used     Alcohol use Yes      Comment: occasionally     Drug use: No     Sexual activity: Yes     Partners: Male     Birth control/ protection: Surgical, Male Surgical      Comment: vas     Other Topics Concern      Service No     Blood Transfusions Yes     1985     Caffeine Concern No     Occupational Exposure No     Hobby Hazards No     Sleep Concern No     Stress Concern No     Weight Concern No     Special Diet No     Back Care No     Exercise Yes     Bike Helmet No     Seat Belt Yes     Self-Exams No     Parent/Sibling W/ Cabg, Mi Or Angioplasty Before 65f 55m? No     Social History Narrative    Works as a        She works as a     Current Outpatient Prescriptions   Medication     meloxicam (MOBIC) 15 MG tablet     acetaminophen-codeine (TYLENOL W/CODEINE NO. 3) 300-30 MG per tablet     NAPROXEN PO     ASPIRIN NOT PRESCRIBED (INTENTIONAL)     No current facility-administered medications for this visit.      Allergies   Allergen Reactions     Sulfa Drugs          Objective:  /72  Ht 5' 4\" (1.626 m)  Wt 119 lb 9.6 oz (54.3 kg)  LMP 02/01/2006  BMI 20.53 kg/m2    General: Alert and in no distress    Head: Normocephalic, atraumatic  Eyes: no scleral icterus or conjunctival erythema   Oropharynx:  Mucous membranes moist  Skin: no erythema, petechiae, or jaundice  CV: regular rhythm by palpation, 2+ distal pulses  Resp: normal respiratory effort without conversational dyspnea   Psych: normal mood and affect    Gait: Non-antalgic, appropriate coordination and balance   Neuro: Motor strength and sensation as noted below    Musculoskeletal:    Bilateral Knee " exam    Inspection:  Right lateral knee swelling    Palpation: Tender over the right lateral knee - IT band insertion and lateral collateral ligament    Knee ROM: Full active and passive ROM without pain    Hip ROM: Full active and passive ROM without pain    Patellar Motion:      Normal patellar tracking noted through range of motion    Strength:  5/5 Hip flexion/abduction/adduction, knee extension/flexion, ankle plantarflexion/dorsiflexion, great toe extension, toe flexion    Special Tests: Negative log roll, negative anterior/posterior drawer, negative Lachman's, no varus/valgus instability at 0 and 30 degrees, Allison's test negative for pain or popping at the lateral/medial joint line    Sensation:  Intact to light touch in the bilateral lower extremities      Radiology:  Independent visualization of images performed.    RIGHT KNEE THREE VIEWS 2/6/2017 10:32 AM      HISTORY: Pain in right knee.     COMPARISON: None.         IMPRESSION: No evidence of fracture or osseous lesion. The joint  spaces are maintained.     ANDERSON NEGRON MD    Assessment:  1. Chronic pain of right knee    2. Neck pain        Plan:  Discussed the assessment with the patient and developed a plan together:  -Differential diagnosis includes but is not limited to IT band insertion pain, LCL irritation, patellofemoral syndrome, lateral meniscus etiology.  Recommend beginning with conservative care as below.  -Tylenol 3 refilled for moderate to severe pain. Take as directed  -Meloxicam ordered. Please take this medication with food.  DO NOT take any other nonsteroidal anti-inflammatory drugs (NSAIDs) such as Advil, ibuprofen, Aleve, naproxen, etc while on this medication.  -Provided home exercises. Please do 5-6 days of exercises per week.  -Ice 15-20 minutes for pain relief or swelling as needed    -We also discussed other future treatment options:  Formal physical therapy, advanced imaging, bracing, and/or steroid injection    Follow Up:  6-8 weeks or sooner if symptoms fail to improve or worsen. Call with any questions or concerns.       Hanny Owens MD, CAQ  [facility]

## 2017-11-10 NOTE — LETTER
"    11/10/2017         RE: Kandice Morton  [address] [facility] [address]        Dear Colleague,    Thank you for referring your patient, Kandice Morton, to the [facility]. Please see a copy of my visit note below.    Sports Medicine Clinic Visit    PCP: Robyn Chung    CC: Patient presents with:  Knee right      HPI:  Kandice Morton is a 57 year old female who is seen in consultation at the request of Robyn Chung PA-C.   She notes right knee pain that began in February with insdious onset. She had knee pain prior to that however in February it was becoming \"annoying\".  She has bouts of excruciating pain. She rates the pain at a 8/10 at its worst and a 1/10 currently. Symptoms are relieved with Tylenol 3, OTC shoe inserts (she has custom ones but they made her knee pain worse), naproxen, and ice temporarily.  Symptoms are worsened by nothing specific. She endorses a lump on the outside of her knee and cracking.  She denies swelling, bruising, popping, grinding, catching, locking, instability, numbness, tingling, weakness, pain in other joints and fever, chills. Other treatment has included nothing.     Review of Systems:  Musculoskeletal: as above  Remainder of review of systems is negative including constitutional, eyes, ENT, CV, pulmonary, GI, , endocrine, skin, hematologic, and neurologic except as noted in HPI or medical history.    History reviewed. No pertinent past surgical/medical/family/social history other than as mentioned in HPI.    Past Medical History:   Diagnosis Date     Genital herpes, unspecified      Subdural hemorrhage following injury, without mention of open intracranial wound, unspecified state of consciousness 1985     Tobacco use disorder      TRAUMATIC SUBDURAL HEMORRH 12/27/2006     Past Surgical History:   Procedure Laterality Date     C OPEN SKULL EVAC HEMATOMA, SUPRATENTORIAL, SUB/ EXTRADURAL  1985     Family History   Problem Relation Age of " "Onset     DIABETES Mother      Type II     Neurologic Disorder Mother      Migraines     Genitourinary Problems Mother      Colitis- ulcerative     Hypertension Father      DIABETES Father      Type II     CANCER Father      lung     Social History     Social History     Marital status:      Spouse name: Prudencio     Number of children: 3     Years of education: 13     Occupational History           Social History Main Topics     Smoking status: Current Every Day Smoker     Packs/day: 1.00     Years: 25.00     Types: Cigarettes     Smokeless tobacco: Never Used     Alcohol use Yes      Comment: occasionally     Drug use: No     Sexual activity: Yes     Partners: Male     Birth control/ protection: Surgical, Male Surgical      Comment: vas     Other Topics Concern      Service No     Blood Transfusions Yes     1985     Caffeine Concern No     Occupational Exposure No     Hobby Hazards No     Sleep Concern No     Stress Concern No     Weight Concern No     Special Diet No     Back Care No     Exercise Yes     Bike Helmet No     Seat Belt Yes     Self-Exams No     Parent/Sibling W/ Cabg, Mi Or Angioplasty Before 65f 55m? No     Social History Narrative    Works as a        She works as a     Current Outpatient Prescriptions   Medication     meloxicam (MOBIC) 15 MG tablet     acetaminophen-codeine (TYLENOL W/CODEINE NO. 3) 300-30 MG per tablet     NAPROXEN PO     ASPIRIN NOT PRESCRIBED (INTENTIONAL)     No current facility-administered medications for this visit.      Allergies   Allergen Reactions     Sulfa Drugs          Objective:  /72  Ht 5' 4\" (1.626 m)  Wt 119 lb 9.6 oz (54.3 kg)  LMP 02/01/2006  BMI 20.53 kg/m2    General: Alert and in no distress    Head: Normocephalic, atraumatic  Eyes: no scleral icterus or conjunctival erythema   Oropharynx:  Mucous membranes moist  Skin: no erythema, petechiae, or jaundice  CV: regular rhythm by palpation, 2+ distal " pulses  Resp: normal respiratory effort without conversational dyspnea   Psych: normal mood and affect    Gait: Non-antalgic, appropriate coordination and balance   Neuro: Motor strength and sensation as noted below    Musculoskeletal:    Bilateral Knee exam    Inspection:  Right lateral knee swelling    Palpation: Tender over the right lateral knee - IT band insertion and lateral collateral ligament    Knee ROM: Full active and passive ROM without pain    Hip ROM: Full active and passive ROM without pain    Patellar Motion:      Normal patellar tracking noted through range of motion    Strength:  5/5 Hip flexion/abduction/adduction, knee extension/flexion, ankle plantarflexion/dorsiflexion, great toe extension, toe flexion    Special Tests: Negative log roll, negative anterior/posterior drawer, negative Lachman's, no varus/valgus instability at 0 and 30 degrees, Allison's test negative for pain or popping at the lateral/medial joint line    Sensation:  Intact to light touch in the bilateral lower extremities      Radiology:  Independent visualization of images performed.    RIGHT KNEE THREE VIEWS 2/6/2017 10:32 AM      HISTORY: Pain in right knee.     COMPARISON: None.         IMPRESSION: No evidence of fracture or osseous lesion. The joint  spaces are maintained.     ANDERSON NEGRON MD    Assessment:  1. Chronic pain of right knee    2. Neck pain        Plan:  Discussed the assessment with the patient and developed a plan together:  -Differential diagnosis includes but is not limited to IT band insertion pain, LCL irritation, patellofemoral syndrome, lateral meniscus etiology.  Recommend beginning with conservative care as below.  -Tylenol 3 refilled for moderate to severe pain. Take as directed  -Meloxicam ordered. Please take this medication with food.  DO NOT take any other nonsteroidal anti-inflammatory drugs (NSAIDs) such as Advil, ibuprofen, Aleve, naproxen, etc while on this medication.  -Provided home  exercises. Please do 5-6 days of exercises per week.  -Ice 15-20 minutes for pain relief or swelling as needed    -We also discussed other future treatment options:  Formal physical therapy, advanced imaging, bracing, and/or steroid injection    Follow Up: 6-8 weeks or sooner if symptoms fail to improve or worsen. Call with any questions or concerns.       Hanny Owens MD, [facility]      Again, thank you for allowing me to participate in the care of your patient.        Sincerely,        Siobhan Owens MD

## 2017-11-10 NOTE — PATIENT INSTRUCTIONS
Today's Plan of Care:  -Tylenol 3 for moderate to severe pain. Take as directed  -Meloxicam. Please take this medication with food.  DO NOT take any other nonsteroidal anti-inflammatory drugs (NSAIDs) such as Advil, ibuprofen, Aleve, naproxen, etc while on this medication.  -Provided home exercises. Please do 5-6 days of exercises per week.  -Ice 15-20 minutes for pain relief or swelling as needed    -We also discussed other future treatment options:  Formal physical therapy, advanced imaging, bracing, and steroid injection      Follow Up: 6-8 weeks or sooner if symptoms fail to improve or worsen. Call with any questions or concerns.

## 2017-11-10 NOTE — MR AVS SNAPSHOT
After Visit Summary   11/10/2017    Kandice Morton    MRN: [id number]           Patient Information     Date Of Birth          1960        Visit Information        Provider Department      11/10/2017 11:40 AM Siobhan Ownes MD [facility]        Today's Diagnoses     Chronic pain of right knee    -  1    Neck pain          Care Instructions    Today's Plan of Care:  -Tylenol 3 for moderate to severe pain. Take as directed  -Meloxicam. Please take this medication with food.  DO NOT take any other nonsteroidal anti-inflammatory drugs (NSAIDs) such as Advil, ibuprofen, Aleve, naproxen, etc while on this medication.  -Provided home exercises. Please do 5-6 days of exercises per week.  -Ice 15-20 minutes for pain relief or swelling as needed    -We also discussed other future treatment options:  Formal physical therapy, advanced imaging, bracing, and steroid injection      Follow Up: 6-8 weeks or sooner if symptoms fail to improve or worsen. Call with any questions or concerns.               Follow-ups after your visit        Who to contact     If you have questions or need follow up information about today's clinic visit or your schedule please contact [facility] directly at [phone].  Normal or non-critical lab and imaging results will be communicated to you by MyChart, letter or phone within 4 business days after the clinic has received the results. If you do not hear from us within 7 days, please contact the clinic through [employer] or phone. If you have a critical or abnormal lab result, we will notify you by phone as soon as possible.  Submit refill requests through [employer] or call your pharmacy and they will forward the refill request to us. Please allow 3 business days for your refill to be completed.          Additional Information About Your Visit        [employer] Information     [employer] lets you send messages to your doctor, view your test  "results, renew your prescriptions, schedule appointments and more. To sign up, go to [email or web address] . Click on \"Log in\" on the left side of the screen, which will take you to the Welcome page. Then click on \"Sign up Now\" on the right side of the page.     You will be asked to enter the access code listed below, as well as some personal information. Please follow the directions to create your username and password.     Your access code is: [email or web address]  Expires: 2018 12:19 PM     Your access code will  in 90 days. If you need help or a new code, please call your [place] clinic or [phone].        Care EveryWhere ID     This is your Care EveryWhere ID. This could be used by other organizations to access your [place] medical records  [id number]        Your Vitals Were     Height Last Period BMI (Body Mass Index)             5' 4\" (1.626 m) 2006 20.53 kg/m2          Blood Pressure from Last 3 Encounters:   11/10/17 112/72   17 118/74   17 116/85    Weight from Last 3 Encounters:   11/10/17 119 lb 9.6 oz (54.3 kg)   17 117 lb 3.2 oz (53.2 kg)   17 124 lb (56.2 kg)              Today, you had the following     No orders found for display         Today's Medication Changes          These changes are accurate as of: 11/10/17 12:19 PM.  If you have any questions, ask your nurse or doctor.               Start taking these medicines.        Dose/Directions    meloxicam 15 MG tablet   Commonly known as:  MOBIC   Used for:  Chronic pain of right knee        Dose:  15 mg   Take 1 tablet (15 mg) by mouth daily as needed   Quantity:  30 tablet   Refills:  0            Where to get your medicines      These medications were sent to [facility] [address] [place] [address] [id number] [facility] [address]     Phone:  [phone]     meloxicam 15 MG tablet         Some of these will need a paper prescription and others can be bought over the " counter.  Ask your nurse if you have questions.     Bring a paper prescription for each of these medications     acetaminophen-codeine 300-30 MG per tablet                Primary Care Provider Office Phone # Fax #    Robyn Chung PA-C [phone] [address] [place] [address]        Equal Access to Services     [facility]: Hadii aad ku hadasho Soomaali, waaxda luqadaha, qaybta kaalmada adeegyada, waxay idiin hayaan adeeg khkathysh laJuliánaan . So [facility] [phone].    ATENCIÓN: Si habla español, tiene a castanon disposición servicios gratuitos de asistencia lingüística. Llame al [phone].    We comply with applicable federal civil rights laws and [place] laws. We do not discriminate on the basis of race, color, national origin, age, disability, sex, sexual orientation, or gender identity.            Thank you!     Thank you for choosing [facility] [place]  for your care. Our goal is always to provide you with excellent care. Hearing back from our patients is one way we can continue to improve our services. Please take a few minutes to complete the written survey that you may receive in the mail after your visit with us. Thank you!             Your Updated Medication List - Protect others around you: Learn how to safely use, store and throw away your medicines at www.disposemymeds.org.          This list is accurate as of: 11/10/17 12:19 PM.  Always use your most recent med list.                   Brand Name Dispense Instructions for use Diagnosis    acetaminophen-codeine 300-30 MG per tablet    TYLENOL w/CODEINE No. 3    28 tablet    Take 1-2 tablets by mouth every 4 hours as needed for mild pain    Neck pain       ASPIRIN NOT PRESCRIBED    INTENTIONAL    1 each    Antiplatelet medication not prescribed intentionally due to Hx of gastrointestinal or intracranial bleed    CARDIOVASCULAR SCREENING; LDL GOAL LESS THAN 130       meloxicam 15 MG tablet    MOBIC    30 tablet    Take 1  tablet (15 mg) by mouth daily as needed    Chronic pain of right knee       NAPROXEN PO      Take by mouth daily as needed for moderate pain

## 2018-01-02 ENCOUNTER — OFFICE VISIT (OUTPATIENT)
Dept: ORTHOPEDICS | Facility: CLINIC | Age: 58
End: 2018-01-02
Payer: COMMERCIAL

## 2018-01-02 VITALS
WEIGHT: 118.5 LBS | HEIGHT: 64 IN | SYSTOLIC BLOOD PRESSURE: 112 MMHG | DIASTOLIC BLOOD PRESSURE: 72 MMHG | BODY MASS INDEX: 20.23 KG/M2

## 2018-01-02 DIAGNOSIS — G89.29 CHRONIC PAIN OF RIGHT KNEE: Primary | ICD-10-CM

## 2018-01-02 DIAGNOSIS — M25.561 CHRONIC PAIN OF RIGHT KNEE: Primary | ICD-10-CM

## 2018-01-02 PROCEDURE — 99214 OFFICE O/P EST MOD 30 MIN: CPT | Performed by: PHYSICAL MEDICINE & REHABILITATION

## 2018-01-02 NOTE — PATIENT INSTRUCTIONS
MRI of the right knee ordered.  Advanced [facility] [phone]   Follow up in clinic after the MRI has been completed.  Please call with questions or concerns.

## 2018-01-02 NOTE — MR AVS SNAPSHOT
"              After Visit Summary   2018    Kandice Morton    MRN: [id number]           Patient Information     Date Of Birth          1960        Visit Information        Provider Department      2018 2:00 PM Siobhan Owens MD [facility]        Today's Diagnoses     Chronic pain of right knee    -  1      Care Instructions    MRI of the right knee ordered.  [facility] [phone]   Follow up in clinic after the MRI has been completed.  Please call with questions or concerns.            Follow-ups after your visit        Future tests that were ordered for you today     Open Future Orders        Priority Expected Expires Ordered    MR Knee Right w/o Contrast Routine  2019            Who to contact     If you have questions or need follow up information about today's clinic visit or your schedule please contact [facility] directly at [phone].  Normal or non-critical lab and imaging results will be communicated to you by MyChart, letter or phone within 4 business days after the clinic has received the results. If you do not hear from us within 7 days, please contact the clinic through MyChart or phone. If you have a critical or abnormal lab result, we will notify you by phone as soon as possible.  Submit refill requests through [facility] or call your pharmacy and they will forward the refill request to us. Please allow 3 business days for your refill to be completed.          Additional Information About Your Visit        MyChart Information     [facility] [place] lets you send messages to your doctor, view your test results, renew your prescriptions, schedule appointments and more. To sign up, go to [email or web address] . Click on \"Log in\" on the left side of the screen, which will take you to the Welcome page. Then click on \"Sign up Now\" on the right side of the page.     You will be asked to enter the access code listed below, as " "well as some personal information. Please follow the directions to create your username and password.     Your access code is: [email or web address]  Expires: 2018 12:19 PM     Your access code will  in 90 days. If you need help or a new code, please call your [place] clinic or [phone].        Care EveryWhere ID     This is your Care EveryWhere ID. This could be used by other organizations to access your [place] medical records  [id number]        Your Vitals Were     Height Last Period BMI (Body Mass Index)             5' 4\" (1.626 m) 2006 20.34 kg/m2          Blood Pressure from Last 3 Encounters:   18 112/72   11/10/17 112/72   17 118/74    Weight from Last 3 Encounters:   18 118 lb 8 oz (53.8 kg)   11/10/17 119 lb 9.6 oz (54.3 kg)   17 117 lb 3.2 oz (53.2 kg)               Primary Care Provider Office Phone # Fax #    Robyn Chung PA-C [phone] [address] [place] [address]        Equal Access to Services     [facility]: Hadii guillermina ku hadasho Soomaali, waaxda luqadaha, qaybta kaalmada adeegyada, crystal brown. So [facility] [phone].    ATENCIÓN: Si habla español, tiene a castanon disposición servicios gratuitos de asistencia lingüística. Llame al [phone].    We comply with applicable federal civil rights laws and [place] laws. We do not discriminate on the basis of race, color, national origin, age, disability, sex, sexual orientation, or gender identity.            Thank you!     Thank you for choosing [facility]  for your care. Our goal is always to provide you with excellent care. Hearing back from our patients is one way we can continue to improve our services. Please take a few minutes to complete the written survey that you may receive in the mail after your visit with us. Thank you!             Your Updated Medication List - Protect others around you: Learn how to safely use, store and " throw away your medicines at www.disposemymeds.org.          This list is accurate as of: 1/2/18  2:25 PM.  Always use your most recent med list.                   Brand Name Dispense Instructions for use Diagnosis    acetaminophen-codeine 300-30 MG per tablet    TYLENOL w/CODEINE No. 3    28 tablet    Take 1-2 tablets by mouth every 4 hours as needed for mild pain    Neck pain       ASPIRIN NOT PRESCRIBED    INTENTIONAL    1 each    Antiplatelet medication not prescribed intentionally due to Hx of gastrointestinal or intracranial bleed    CARDIOVASCULAR SCREENING; LDL GOAL LESS THAN 130       meloxicam 15 MG tablet    MOBIC    30 tablet    Take 1 tablet (15 mg) by mouth daily as needed    Chronic pain of right knee       NAPROXEN PO      Take by mouth daily as needed for moderate pain

## 2018-01-02 NOTE — LETTER
1/2/2018         RE: Kandice Morton  [address] [facility] [address]        Dear Colleague,    Thank you for referring your patient, Kandice Morton, to the [facility]. Please see a copy of my visit note below.    Sports Medicine Clinic Visit - Interim History January 2, 2018    Initial Visit Date 11/10/2017    PCP: Robyn Chung    Kandice Morton is a 57 year old female who is seen in f/u up for right knee pain. Since last visit on 11/10/2017 patient has not had much improvement. She has continued to have constant pain, some time the pain does get worse.  She rates the pain at a  4/10 currently.  Symptoms are relieved with ice and Aleve.  Symptoms are worsened at night or in a stationary position.  She has been doing the home exercises 3-4 times per week with no relief.      Review of Systems  Musculoskeletal: as above  Remainder of review of systems is negative including constitutional, eyes, ENT, CV, pulmonary, GI, , endocrine, skin, hematologic, and neurologic except as noted in HPI or medical history.    History reviewed. No pertinent past surgical/medical/family/social history other than as mentioned in HPI.    Past Medical History:   Diagnosis Date     Genital herpes, unspecified      Subdural hemorrhage following injury, without mention of open intracranial wound, unspecified state of consciousness 1985     Tobacco use disorder      TRAUMATIC SUBDURAL HEMORRH 12/27/2006     Past Surgical History:   Procedure Laterality Date     C OPEN SKULL EVAC HEMATOMA, SUPRATENTORIAL, SUB/ EXTRADURAL  1985     Family History   Problem Relation Age of Onset     DIABETES Mother      Type II     Neurologic Disorder Mother      Migraines     Genitourinary Problems Mother      Colitis- ulcerative     Hypertension Father      DIABETES Father      Type II     CANCER Father      lung     Social History     Social History     Marital status:      Spouse name: Prudencio     Number of  "children: 3     Years of education: 13     Occupational History           Social History Main Topics     Smoking status: Current Every Day Smoker     Packs/day: 1.00     Years: 25.00     Types: Cigarettes     Smokeless tobacco: Never Used     Alcohol use Yes      Comment: occasionally     Drug use: No     Sexual activity: Yes     Partners: Male     Birth control/ protection: Surgical, Male Surgical      Comment: vas     Other Topics Concern      Service No     Blood Transfusions Yes     1985     Caffeine Concern No     Occupational Exposure No     Hobby Hazards No     Sleep Concern No     Stress Concern No     Weight Concern No     Special Diet No     Back Care No     Exercise Yes     Bike Helmet No     Seat Belt Yes     Self-Exams No     Parent/Sibling W/ Cabg, Mi Or Angioplasty Before 65f 55m? No     Social History Narrative    Works as a        She works as a     Current Outpatient Prescriptions   Medication     NAPROXEN PO     meloxicam (MOBIC) 15 MG tablet     acetaminophen-codeine (TYLENOL W/CODEINE NO. 3) 300-30 MG per tablet     ASPIRIN NOT PRESCRIBED (INTENTIONAL)     No current facility-administered medications for this visit.      Allergies   Allergen Reactions     Sulfa Drugs          Objective:  /72  Ht 5' 4\" (1.626 m)  Wt 118 lb 8 oz (53.8 kg)  LMP 02/01/2006  BMI 20.34 kg/m2    General: Alert and in no distress    Head: Normocephalic, atraumatic  Eyes: no scleral icterus or conjunctival erythema   Oropharynx:  Mucous membranes moist  Skin: no erythema, petechiae, or jaundice  CV: regular rhythm by palpation, 2+ distal pulses  Resp: normal respiratory effort without conversational dyspnea   Psych: normal mood and affect    Gait: Non-antalgic, appropriate coordination and balance   Neuro: Motor strength and sensation as noted below    Musculoskeletal:    Bilateral Knee exam    Inspection:  Protrusion right lateral knee    Palpation: Tender over the right " lateral knee    Knee ROM: Full active knee extension without pain    Patellar Motion:      Normal patellar tracking noted through range of motion    Strength:  5/5 Hip flexion/abduction/adduction, knee extension/flexion, ankle plantarflexion/dorsiflexion, great toe extension, toe flexion    Sensation:  Intact to light touch in the bilateral lower extremities      Radiology:  No new imaging    Assessment:  1. Chronic pain of right knee        Plan:  Discussed the assessment with the patient and developed a plan together:  -MRI of the right knee ordered.  Advanced Imaging [phone]   -Follow up in clinic after the MRI has been completed.  Please call with questions or concerns.        Hanny Owens MD, [facility]        Again, thank you for allowing me to participate in the care of your patient.        Sincerely,        Siobhan Owens MD

## 2018-01-02 NOTE — PROGRESS NOTES
Sports Medicine Clinic Visit - Interim History January 2, 2018    Initial Visit Date 11/10/2017    PCP: Robyn Chung Praveen is a 57 year old female who is seen in f/u up for right knee pain. Since last visit on 11/10/2017 patient has not had much improvement. She has continued to have constant pain, some time the pain does get worse.  She rates the pain at a  4/10 currently.  Symptoms are relieved with ice and Aleve.  Symptoms are worsened at night or in a stationary position.  She has been doing the home exercises 3-4 times per week with no relief.      Review of Systems  Musculoskeletal: as above  Remainder of review of systems is negative including constitutional, eyes, ENT, CV, pulmonary, GI, , endocrine, skin, hematologic, and neurologic except as noted in HPI or medical history.    History reviewed. No pertinent past surgical/medical/family/social history other than as mentioned in HPI.    Past Medical History:   Diagnosis Date     Genital herpes, unspecified      Subdural hemorrhage following injury, without mention of open intracranial wound, unspecified state of consciousness 1985     Tobacco use disorder      TRAUMATIC SUBDURAL HEMORRH 12/27/2006     Past Surgical History:   Procedure Laterality Date     C OPEN SKULL EVAC HEMATOMA, SUPRATENTORIAL, SUB/ EXTRADURAL  1985     Family History   Problem Relation Age of Onset     DIABETES Mother      Type II     Neurologic Disorder Mother      Migraines     Genitourinary Problems Mother      Colitis- ulcerative     Hypertension Father      DIABETES Father      Type II     CANCER Father      lung     Social History     Social History     Marital status:      Spouse name: Prudencio     Number of children: 3     Years of education: 13     Occupational History           Social History Main Topics     Smoking status: Current Every Day Smoker     Packs/day: 1.00     Years: 25.00     Types: Cigarettes     Smokeless tobacco: Never Used  "    Alcohol use Yes      Comment: occasionally     Drug use: No     Sexual activity: Yes     Partners: Male     Birth control/ protection: Surgical, Male Surgical      Comment: vas     Other Topics Concern      Service No     Blood Transfusions Yes     1985     Caffeine Concern No     Occupational Exposure No     Hobby Hazards No     Sleep Concern No     Stress Concern No     Weight Concern No     Special Diet No     Back Care No     Exercise Yes     Bike Helmet No     Seat Belt Yes     Self-Exams No     Parent/Sibling W/ Cabg, Mi Or Angioplasty Before 65f 55m? No     Social History Narrative    Works as a        She works as a     Current Outpatient Prescriptions   Medication     NAPROXEN PO     meloxicam (MOBIC) 15 MG tablet     acetaminophen-codeine (TYLENOL W/CODEINE NO. 3) 300-30 MG per tablet     ASPIRIN NOT PRESCRIBED (INTENTIONAL)     No current facility-administered medications for this visit.      Allergies   Allergen Reactions     Sulfa Drugs          Objective:  /72  Ht 5' 4\" (1.626 m)  Wt 118 lb 8 oz (53.8 kg)  LMP 02/01/2006  BMI 20.34 kg/m2    General: Alert and in no distress    Head: Normocephalic, atraumatic  Eyes: no scleral icterus or conjunctival erythema   Oropharynx:  Mucous membranes moist  Skin: no erythema, petechiae, or jaundice  CV: regular rhythm by palpation, 2+ distal pulses  Resp: normal respiratory effort without conversational dyspnea   Psych: normal mood and affect    Gait: Non-antalgic, appropriate coordination and balance   Neuro: Motor strength and sensation as noted below    Musculoskeletal:    Bilateral Knee exam    Inspection:  Protrusion right lateral knee    Palpation: Tender over the right lateral knee    Knee ROM: Full active knee extension without pain    Patellar Motion:      Normal patellar tracking noted through range of motion    Strength:  5/5 Hip flexion/abduction/adduction, knee extension/flexion, ankle " plantarflexion/dorsiflexion, great toe extension, toe flexion    Sensation:  Intact to light touch in the bilateral lower extremities      Radiology:  No new imaging    Assessment:  1. Chronic pain of right knee        Plan:  Discussed the assessment with the patient and developed a plan together:  -MRI of the right knee ordered.  [facility] [phone]   -Follow up in clinic after the MRI has been completed.  Please call with questions or concerns.        Hanny Owens MD, CAQ  [facility]

## 2018-01-08 ENCOUNTER — HOSPITAL ENCOUNTER (OUTPATIENT)
Dept: MRI IMAGING | Facility: CLINIC | Age: 58
Discharge: HOME | End: 2018-01-08
Attending: PHYSICAL MEDICINE & REHABILITATION | Admitting: PHYSICAL MEDICINE & REHABILITATION
Payer: COMMERCIAL

## 2018-01-08 DIAGNOSIS — M25.561 CHRONIC PAIN OF RIGHT KNEE: ICD-10-CM

## 2018-01-08 DIAGNOSIS — G89.29 CHRONIC PAIN OF RIGHT KNEE: ICD-10-CM

## 2018-01-08 PROCEDURE — 73721 MRI JNT OF LWR EXTRE W/O DYE: CPT | Mod: RT

## 2018-01-16 ENCOUNTER — OFFICE VISIT (OUTPATIENT)
Dept: ORTHOPEDICS | Facility: CLINIC | Age: 58
End: 2018-01-16
Payer: COMMERCIAL

## 2018-01-16 VITALS
WEIGHT: 118 LBS | DIASTOLIC BLOOD PRESSURE: 74 MMHG | BODY MASS INDEX: 20.14 KG/M2 | SYSTOLIC BLOOD PRESSURE: 111 MMHG | HEART RATE: 86 BPM | HEIGHT: 64 IN

## 2018-01-16 DIAGNOSIS — M25.561 CHRONIC PAIN OF RIGHT KNEE: Primary | ICD-10-CM

## 2018-01-16 DIAGNOSIS — S83.281D TEAR OF LATERAL MENISCUS OF RIGHT KNEE, CURRENT, UNSPECIFIED TEAR TYPE, SUBSEQUENT ENCOUNTER: ICD-10-CM

## 2018-01-16 DIAGNOSIS — G89.29 CHRONIC PAIN OF RIGHT KNEE: Primary | ICD-10-CM

## 2018-01-16 DIAGNOSIS — M23.000 CYST OF LATERAL MENISCUS OF RIGHT KNEE: ICD-10-CM

## 2018-01-16 PROCEDURE — 99213 OFFICE O/P EST LOW 20 MIN: CPT | Performed by: PHYSICAL MEDICINE & REHABILITATION

## 2018-01-16 NOTE — MR AVS SNAPSHOT
"              After Visit Summary   1/16/2018    Kandice Morton    MRN: [id number]           Patient Information     Date Of Birth          1960        Visit Information        Provider Department      1/16/2018 2:00 PM Siobhan Owens MD [facility]        Today's Diagnoses     Chronic pain of right knee    -  1      Care Instructions    -Naproxen or Tylenol as needed for pain relief.  Please take naproxen with food.  -Ice or heat for 15-20 minutes as needed for soreness.  -Continue home exercises.  -We also talked about formal physical therapy, steroid injection, and a referral to orthopedic surgery.      Follow up as needed.  Please call with questions or concerns.            Follow-ups after your visit        Who to contact     If you have questions or need follow up information about today's clinic visit or your schedule please contact [facility] directly at [phone].  Normal or non-critical lab and imaging results will be communicated to you by [employer], letter or phone within 4 business days after the clinic has received the results. If you do not hear from us within 7 days, please contact the clinic through Yes[employer] or phone. If you have a critical or abnormal lab result, we will notify you by phone as soon as possible.  Submit refill requests through [employer] or call your pharmacy and they will forward the refill request to us. Please allow 3 business days for your refill to be completed.          Additional Information About Your Visit        MyChart Information     [employer] lets you send messages to your doctor, view your test results, renew your prescriptions, schedule appointments and more. To sign up, go to [email or web address] . Click on \"Log in\" on the left side of the screen, which will take you to the Welcome page. Then click on \"Sign up Now\" on the right side of the page.     You will be asked to enter the access code listed below, as well as " "some personal information. Please follow the directions to create your username and password.     Your access code is: [email or web address]  Expires: 2018 12:19 PM     Your access code will  in 90 days. If you need help or a new code, please call your [place] clinic or [phone].        Care EveryWhere ID     This is your Care EveryWhere ID. This could be used by other organizations to access your [place] medical records  [id number]        Your Vitals Were     Pulse Height Last Period BMI (Body Mass Index)          86 5' 4\" (1.626 m) 2006 20.25 kg/m2         Blood Pressure from Last 3 Encounters:   18 111/74   18 112/72   11/10/17 112/72    Weight from Last 3 Encounters:   18 118 lb (53.5 kg)   18 118 lb 8 oz (53.8 kg)   11/10/17 119 lb 9.6 oz (54.3 kg)              Today, you had the following     No orders found for display       Primary Care Provider Office Phone # Fax #    Robyn Chung PA-C [phone] [address] [place] [address]        Equal Access to Services     JOSEF FLEMING : Hadii aad ku hadasho Soomaali, waaxda luqadaha, qaybta kaalmada adeegyada, waxay lazarusin arleth gandhi . So [facility] [phone].    ATENCIÓN: Si habla español, tiene a castanon disposición servicios gratuitos de asistencia lingüística. Llame al [phone].    We comply with applicable federal civil rights laws and [place] laws. We do not discriminate on the basis of race, color, national origin, age, disability, sex, sexual orientation, or gender identity.            Thank you!     Thank you for choosing [facility]  for your care. Our goal is always to provide you with excellent care. Hearing back from our patients is one way we can continue to improve our services. Please take a few minutes to complete the written survey that you may receive in the mail after your visit with us. Thank you!             Your Updated Medication List - Protect " others around you: Learn how to safely use, store and throw away your medicines at www.disposemymeds.org.          This list is accurate as of: 1/16/18  2:15 PM.  Always use your most recent med list.                   Brand Name Dispense Instructions for use Diagnosis    acetaminophen-codeine 300-30 MG per tablet    TYLENOL w/CODEINE No. 3    28 tablet    Take 1-2 tablets by mouth every 4 hours as needed for mild pain    Neck pain       ASPIRIN NOT PRESCRIBED    INTENTIONAL    1 each    Antiplatelet medication not prescribed intentionally due to Hx of gastrointestinal or intracranial bleed    CARDIOVASCULAR SCREENING; LDL GOAL LESS THAN 130       meloxicam 15 MG tablet    MOBIC    30 tablet    Take 1 tablet (15 mg) by mouth daily as needed    Chronic pain of right knee       NAPROXEN PO      Take by mouth daily as needed for moderate pain

## 2018-01-16 NOTE — PATIENT INSTRUCTIONS
-Naproxen or Tylenol as needed for pain relief.  Please take naproxen with food.  -Ice or heat for 15-20 minutes as needed for soreness.  -Continue home exercises.  -We also talked about formal physical therapy, steroid injection, and a referral to orthopedic surgery.      Follow up as needed.  Please call with questions or concerns.

## 2018-01-16 NOTE — PROGRESS NOTES
Sports Medicine Clinic Visit - Interim History January 16, 2018    Initial Visit Date 11/10/2017    PCP: Robyn Chung Praveen is a 57 year old female who is seen in f/u up for right knee pain. Since last visit on 1/2/2018 patient has not been having as much pain. She reports that this is typical, that it comes and goes.  She rates the pain at a  1/10 currently.  Symptoms are relieved with Ice and Aleve.  Symptoms are worsened at night and prolonged positions.         Review of Systems  Musculoskeletal: as above  Remainder of review of systems is negative including constitutional, eyes, ENT, CV, pulmonary, GI, , endocrine, skin, hematologic, and neurologic except as noted in HPI or medical history.    History reviewed. No pertinent past surgical/medical/family/social history other than as mentioned in HPI.    Past Medical History:   Diagnosis Date     Genital herpes, unspecified      Subdural hemorrhage following injury, without mention of open intracranial wound, unspecified state of consciousness 1985     Tobacco use disorder      TRAUMATIC SUBDURAL HEMORRH 12/27/2006     Past Surgical History:   Procedure Laterality Date     C OPEN SKULL EVAC HEMATOMA, SUPRATENTORIAL, SUB/ EXTRADURAL  1985     Family History   Problem Relation Age of Onset     DIABETES Mother      Type II     Neurologic Disorder Mother      Migraines     Genitourinary Problems Mother      Colitis- ulcerative     Hypertension Father      DIABETES Father      Type II     CANCER Father      lung     Social History     Social History     Marital status:      Spouse name: Prudencio     Number of children: 3     Years of education: 13     Occupational History           Social History Main Topics     Smoking status: Current Every Day Smoker     Packs/day: 1.00     Years: 25.00     Types: Cigarettes     Smokeless tobacco: Never Used     Alcohol use Yes      Comment: occasionally     Drug use: No     Sexual activity: Yes     " Partners: Male     Birth control/ protection: Surgical, Male Surgical      Comment: vas     Other Topics Concern      Service No     Blood Transfusions Yes     1985     Caffeine Concern No     Occupational Exposure No     Hobby Hazards No     Sleep Concern No     Stress Concern No     Weight Concern No     Special Diet No     Back Care No     Exercise Yes     Bike Helmet No     Seat Belt Yes     Self-Exams No     Parent/Sibling W/ Cabg, Mi Or Angioplasty Before 65f 55m? No     Social History Narrative    Works as a        She works as a     Current Outpatient Prescriptions   Medication     meloxicam (MOBIC) 15 MG tablet     acetaminophen-codeine (TYLENOL W/CODEINE NO. 3) 300-30 MG per tablet     NAPROXEN PO     ASPIRIN NOT PRESCRIBED (INTENTIONAL)     No current facility-administered medications for this visit.      Allergies   Allergen Reactions     Sulfa Drugs          Objective:  /74  Pulse 86  Ht 5' 4\" (1.626 m)  Wt 118 lb (53.5 kg)  LMP 02/01/2006  BMI 20.25 kg/m2    General: Alert and in no distress    Head: Normocephalic, atraumatic  Eyes: no scleral icterus or conjunctival erythema   Oropharynx:  Mucous membranes moist  Skin: no erythema, petechiae, or jaundice  CV: regular rhythm by palpation, 2+ distal pulses  Resp: normal respiratory effort without conversational dyspnea   Psych: normal mood and affect    Gait: Non-antalgic, appropriate coordination and balance   Neuro: Motor strength and sensation as noted below    Musculoskeletal:    Bilateral Knee exam     Inspection:  Protrusion right lateral knee     Palpation: Tender over the right lateral knee     Knee ROM: Full active knee extension without pain     Patellar Motion:      Normal patellar tracking noted through range of motion     Strength:  5/5 Hip flexion/abduction/adduction, knee extension/flexion, ankle plantarflexion/dorsiflexion     Sensation:  Intact to light touch in the bilateral lower " extremities    Radiology:  Independent visualization of images performed  Recent Results (from the past 744 hour(s))   MR Knee Right w/o Contrast    Narrative    MR KNEE RIGHT WITHOUT CONTRAST   1/8/2018 2:53 PM    HISTORY:  Lateral knee pain and swelling. Chronic pain of right knee.    TECHNIQUE:  Sagittal proton density and T2, coronal T1, and coronal  and transverse fat suppressed T2 weighted images.    COMPARISON: X-ray from 2/6/2017.    FINDINGS:   Medial Meniscus: Mild intrameniscal degeneration posterior horn medial  meniscus without evidence of tear.       Lateral Meniscus: Horizontally oriented tear body of lateral meniscus  with large septated meniscal cyst measuring 4.5 x 0.9 x 2.9 cm.       Anterior Cruciate Ligament: Intact.     Posterior Cruciate Ligament: There is abnormal increased signal in the  proximal posterior cruciate ligament likely related to prior sprain or  partial tear.     Medial Collateral Ligament: Intact.    Lateral Collateral Ligament Complex, Popliteus Tendon: The fibular  collateral ligament, biceps femoris tendon, popliteal tendon, and  iliotibial band are intact.    Osseous Structures and Cartilaginous Surfaces:  There is a 2 cm area  of grade 3 chondromalacia mid and posterior aspect of lateral femoral  condyle adjacent to the posterior horn lateral meniscus best seen on  sagittal series 8 image 11. Medial compartment articular cartilage  intact. Patellofemoral articular cartilage intact.    Extensor Mechanism: Quadriceps and infrapatellar tendons are intact.  There is increased signal in the lateral patellar retinaculum that may  be due to a prior injury. Some of this may be due to fluid from a  direct soft tissue contusion in this region.    Joint Space: There is a physiological amount of fluid in the joint  space.  No definite articular bodies are demonstrated.    Additional Findings:  No semimembranosus-tibial collateral ligament or  pes anserine bursitis. No Baker's cyst.    "   Impression    IMPRESSION:    1. Horizontally oriented tear body of the lateral meniscus with large  septated meniscal cyst adjacent to it measuring 4.5 x 0.9 x 2.9 cm.  2. 2 cm area of grade 3 chondromalacia mid and posterior aspects of  lateral femoral condyle adjacent to the posterior horn lateral  meniscus.  3. Increased signal in the lateral aspect of the knee likely  representing a direct contusion in this region. If there has been no  trauma this may be leakage of fluid from the large lateral meniscal  cyst.  4. Mild intrameniscal degeneration posterior horn medial meniscus. No  evidence of medial meniscus tear.    KUSH GOLDMAN MD         Assessment:  1. Chronic pain of right knee    2. Cyst of lateral meniscus of right knee    3. Tear of lateral meniscus of right knee, current, unspecified tear type, subsequent encounter        Plan:  Discussed the assessment with the patient and developed a plan together:  -Juana is doing better with her knee pain and states that she mainly wanted the MRI to ensure it was not something \"terminal\" like a \"tumor.\"  She would like to continue with her current plan.  -Naproxen or Tylenol as needed for pain relief.  Please take naproxen with food.  -Ice or heat for 15-20 minutes as needed for soreness.  -Continue home exercises.  -We also discussed formal physical therapy, steroid injection, and a referral to orthopedic surgery.      -Follow up as needed.  Please call with questions or concerns.      Hanny Owens MD, [facility]      "

## 2018-01-16 NOTE — LETTER
1/16/2018         RE: Kandice Morton  [address] [facility] [address]        Dear Colleague,    Thank you for referring your patient, Kandice Morton, to the [facility]. Please see a copy of my visit note below.    Sports Medicine Clinic Visit - Interim History January 16, 2018    Initial Visit Date 11/10/2017    PCP: Robyn Chung    Kandice Morton is a 57 year old female who is seen in f/u up for    Chronic pain of right knee  Cyst of lateral meniscus of right knee  Tear of lateral meniscus of right knee, current, unspecified tear type, subsequent encounter. Since last visit on 1/2/2018 patient has not been having as much pain. She reports that this is typical, that it comes and goes.  She rates the pain at a  1/10 currently.  Symptoms are relieved with Ice and Aleve.  Symptoms are worsened at night and prolonged positions.         Review of Systems  Musculoskeletal: as above  Remainder of review of systems is negative including constitutional, eyes, ENT, CV, pulmonary, GI, , endocrine, skin, hematologic, and neurologic except as noted in HPI or medical history.    History reviewed. No pertinent past surgical/medical/family/social history other than as mentioned in HPI.    Past Medical History:   Diagnosis Date     Genital herpes, unspecified      Subdural hemorrhage following injury, without mention of open intracranial wound, unspecified state of consciousness 1985     Tobacco use disorder      TRAUMATIC SUBDURAL HEMORRH 12/27/2006     Past Surgical History:   Procedure Laterality Date     C OPEN SKULL EVAC HEMATOMA, SUPRATENTORIAL, SUB/ EXTRADURAL  1985     Family History   Problem Relation Age of Onset     DIABETES Mother      Type II     Neurologic Disorder Mother      Migraines     Genitourinary Problems Mother      Colitis- ulcerative     Hypertension Father      DIABETES Father      Type II     CANCER Father      lung     Social History     Social History     Marital  "status:      Spouse name: Prudencio     Number of children: 3     Years of education: 13     Occupational History           Social History Main Topics     Smoking status: Current Every Day Smoker     Packs/day: 1.00     Years: 25.00     Types: Cigarettes     Smokeless tobacco: Never Used     Alcohol use Yes      Comment: occasionally     Drug use: No     Sexual activity: Yes     Partners: Male     Birth control/ protection: Surgical, Male Surgical      Comment: vas     Other Topics Concern      Service No     Blood Transfusions Yes     1985     Caffeine Concern No     Occupational Exposure No     Hobby Hazards No     Sleep Concern No     Stress Concern No     Weight Concern No     Special Diet No     Back Care No     Exercise Yes     Bike Helmet No     Seat Belt Yes     Self-Exams No     Parent/Sibling W/ Cabg, Mi Or Angioplasty Before 65f 55m? No     Social History Narrative    Works as a        She works as a     Current Outpatient Prescriptions   Medication     meloxicam (MOBIC) 15 MG tablet     acetaminophen-codeine (TYLENOL W/CODEINE NO. 3) 300-30 MG per tablet     NAPROXEN PO     ASPIRIN NOT PRESCRIBED (INTENTIONAL)     No current facility-administered medications for this visit.      Allergies   Allergen Reactions     Sulfa Drugs          Objective:  /74  Pulse 86  Ht 5' 4\" (1.626 m)  Wt 118 lb (53.5 kg)  LMP 02/01/2006  BMI 20.25 kg/m2    General: Alert and in no distress    Head: Normocephalic, atraumatic  Eyes: no scleral icterus or conjunctival erythema   Oropharynx:  Mucous membranes moist  Skin: no erythema, petechiae, or jaundice  CV: regular rhythm by palpation, 2+ distal pulses  Resp: normal respiratory effort without conversational dyspnea   Psych: normal mood and affect    Gait: Non-antalgic, appropriate coordination and balance   Neuro: Motor strength and sensation as noted below    Musculoskeletal:    Bilateral Knee exam     Inspection:  " Protrusion right lateral knee     Palpation: Tender over the right lateral knee     Knee ROM: Full active knee extension without pain     Patellar Motion:      Normal patellar tracking noted through range of motion     Strength:  5/5 Hip flexion/abduction/adduction, knee extension/flexion, ankle plantarflexion/dorsiflexion     Sensation:  Intact to light touch in the bilateral lower extremities    Radiology:  Independent visualization of images performed  Recent Results (from the past 744 hour(s))   MR Knee Right w/o Contrast    Narrative    MR KNEE RIGHT WITHOUT CONTRAST   1/8/2018 2:53 PM    HISTORY:  Lateral knee pain and swelling. Chronic pain of right knee.    TECHNIQUE:  Sagittal proton density and T2, coronal T1, and coronal  and transverse fat suppressed T2 weighted images.    COMPARISON: X-ray from 2/6/2017.    FINDINGS:   Medial Meniscus: Mild intrameniscal degeneration posterior horn medial  meniscus without evidence of tear.       Lateral Meniscus: Horizontally oriented tear body of lateral meniscus  with large septated meniscal cyst measuring 4.5 x 0.9 x 2.9 cm.       Anterior Cruciate Ligament: Intact.     Posterior Cruciate Ligament: There is abnormal increased signal in the  proximal posterior cruciate ligament likely related to prior sprain or  partial tear.     Medial Collateral Ligament: Intact.    Lateral Collateral Ligament Complex, Popliteus Tendon: The fibular  collateral ligament, biceps femoris tendon, popliteal tendon, and  iliotibial band are intact.    Osseous Structures and Cartilaginous Surfaces:  There is a 2 cm area  of grade 3 chondromalacia mid and posterior aspect of lateral femoral  condyle adjacent to the posterior horn lateral meniscus best seen on  sagittal series 8 image 11. Medial compartment articular cartilage  intact. Patellofemoral articular cartilage intact.    Extensor Mechanism: Quadriceps and infrapatellar tendons are intact.  There is increased signal in the lateral  "patellar retinaculum that may  be due to a prior injury. Some of this may be due to fluid from a  direct soft tissue contusion in this region.    Joint Space: There is a physiological amount of fluid in the joint  space.  No definite articular bodies are demonstrated.    Additional Findings:  No semimembranosus-tibial collateral ligament or  pes anserine bursitis. No Baker's cyst.      Impression    IMPRESSION:    1. Horizontally oriented tear body of the lateral meniscus with large  septated meniscal cyst adjacent to it measuring 4.5 x 0.9 x 2.9 cm.  2. 2 cm area of grade 3 chondromalacia mid and posterior aspects of  lateral femoral condyle adjacent to the posterior horn lateral  meniscus.  3. Increased signal in the lateral aspect of the knee likely  representing a direct contusion in this region. If there has been no  trauma this may be leakage of fluid from the large lateral meniscal  cyst.  4. Mild intrameniscal degeneration posterior horn medial meniscus. No  evidence of medial meniscus tear.    KUSH GOLDMAN MD         Assessment:  1. Chronic pain of right knee    2. Cyst of lateral meniscus of right knee    3. Tear of lateral meniscus of right knee, current, unspecified tear type, subsequent encounter        Plan:  Discussed the assessment with the patient and developed a plan together:  -Juana is doing better with her knee pain and states that she mainly wanted the MRI to ensure it was not something \"terminal\" like a \"tumor.\"  She would like to continue with her current plan.  -Naproxen or Tylenol as needed for pain relief.  Please take naproxen with food.  -Ice or heat for 15-20 minutes as needed for soreness.  -Continue home exercises.  -We also discussed formal physical therapy, steroid injection, and a referral to orthopedic surgery.      -Follow up as needed.  Please call with questions or concerns.      Hanny Owens MD, CAQ  [facility]        Again, thank you for allowing me " to participate in the care of your patient.        Sincerely,        Siobhan Owens MD

## 2018-05-11 ENCOUNTER — HEALTH MAINTENANCE LETTER (OUTPATIENT)
Age: 58
End: 2018-05-11

## 2018-09-14 NOTE — PROGRESS NOTES
"  SUBJECTIVE:   Kandice Morton is a 58 year old female who presents to clinic today for the following health issues:      Physical   Annual:     Getting at least 3 servings of Calcium per day:  NO    Bi-annual eye exam:  Yes    Dental care twice a year:  Yes    Sleep apnea or symptoms of sleep apnea:  None    Diet:  Regular (no restrictions)    Frequency of exercise:  None    Taking medications regularly:  Not Applicable    Medication side effects:  Not applicable    Additional concerns today:  No    {additional problems for roomer to add, delete if none:458744}  Problem list and histories reviewed & adjusted, as indicated.  Additional history: {NONE - AS DOCUMENTED:892509::\"as documented\"}    {ACUTE Problem SUPERLIST - brief histories:605678}    {HIST REVIEW/ LINKS 2:731026}    {PROVIDER CHARTING PREFERENCE:334238}  Answers for HPI/ROS submitted by the patient on 9/19/2018   PHQ-2 Score: 0    "

## 2018-09-17 ENCOUNTER — HOSPITAL ENCOUNTER (OUTPATIENT)
Dept: MAMMOGRAPHY | Facility: CLINIC | Age: 58
Discharge: HOME | End: 2018-09-17
Attending: PHYSICIAN ASSISTANT | Admitting: PHYSICIAN ASSISTANT
Payer: COMMERCIAL

## 2018-09-17 DIAGNOSIS — Z12.31 VISIT FOR SCREENING MAMMOGRAM: ICD-10-CM

## 2018-09-17 PROCEDURE — 77067 SCR MAMMO BI INCL CAD: CPT

## 2018-09-19 ENCOUNTER — OFFICE VISIT (OUTPATIENT)
Dept: FAMILY MEDICINE | Facility: OTHER | Age: 58
End: 2018-09-19
Payer: COMMERCIAL

## 2018-09-19 VITALS
HEIGHT: 64 IN | SYSTOLIC BLOOD PRESSURE: 108 MMHG | DIASTOLIC BLOOD PRESSURE: 72 MMHG | BODY MASS INDEX: 20.06 KG/M2 | TEMPERATURE: 97.8 F | RESPIRATION RATE: 12 BRPM | WEIGHT: 117.5 LBS | HEART RATE: 88 BPM

## 2018-09-19 DIAGNOSIS — F17.200 TOBACCO USE DISORDER: ICD-10-CM

## 2018-09-19 DIAGNOSIS — Z12.11 SCREEN FOR COLON CANCER: ICD-10-CM

## 2018-09-19 DIAGNOSIS — Z00.00 ENCOUNTER FOR ROUTINE ADULT HEALTH EXAMINATION WITHOUT ABNORMAL FINDINGS: Primary | ICD-10-CM

## 2018-09-19 DIAGNOSIS — M54.9 BACK PAIN, UNSPECIFIED BACK LOCATION, UNSPECIFIED BACK PAIN LATERALITY, UNSPECIFIED CHRONICITY: ICD-10-CM

## 2018-09-19 PROCEDURE — 99396 PREV VISIT EST AGE 40-64: CPT | Performed by: PHYSICIAN ASSISTANT

## 2018-09-19 RX ORDER — ACETAMINOPHEN AND CODEINE PHOSPHATE 300; 30 MG/1; MG/1
1-2 TABLET ORAL EVERY 4 HOURS PRN
Qty: 28 TABLET | Refills: 0 | Status: SHIPPED | OUTPATIENT
Start: 2018-09-19 | End: 2019-09-24

## 2018-09-19 ASSESSMENT — PAIN SCALES - GENERAL: PAINLEVEL: NO PAIN (0)

## 2018-09-19 NOTE — PROGRESS NOTES
SUBJECTIVE:   CC: Kandice Morton is an 58 year old woman who presents for preventive health visit.     Physical   Annual:     Getting at least 3 servings of Calcium per day:  NO    Bi-annual eye exam:  Yes    Dental care twice a year:  Yes    Sleep apnea or symptoms of sleep apnea:  None    Diet:  Regular (no restrictions)    Frequency of exercise:  None    Taking medications regularly:  Not Applicable    Additional concerns today:  No      Her knee pain is getting better.  She started to use an otc pair of insoles with good relief    She had a history of tubular adenomas on her colonoscopy March 2017, she was to have this retested again one year later.  In reviewing care everywhere, I see that they did call her in August 2018, to remind her to do her colonoscopy.  Today's PHQ-2 Score:   PHQ-2 ( 1999 [employer]) 9/19/2018   Q1: Little interest or pleasure in doing things 0   Q2: Feeling down, depressed or hopeless 0   PHQ-2 Score 0   Q1: Little interest or pleasure in doing things Not at all   Q2: Feeling down, depressed or hopeless Not at all   PHQ-2 Score 0       Abuse: Current or Past(Physical, Sexual or Emotional)- No  Do you feel safe in your environment - Yes    Social History   Substance Use Topics     Smoking status: Current Every Day Smoker     Packs/day: 1.00     Years: 25.00     Types: Cigarettes     Smokeless tobacco: Never Used     Alcohol use Yes      Comment: occasionally     Alcohol Use 9/19/2018   If you drink alcohol do you typically have greater than 3 drinks per day OR greater than 7 drinks per week? Yes   AUDIT SCORE  6       Reviewed orders with patient.  Reviewed health maintenance and updated orders accordingly - Yes  Labs reviewed in EPIC  BP Readings from Last 3 Encounters:   09/19/18 108/72   01/16/18 111/74   01/02/18 112/72    Wt Readings from Last 3 Encounters:   09/19/18 117 lb 8 oz (53.3 kg)   01/16/18 118 lb (53.5 kg)   01/02/18 118 lb 8 oz (53.8 kg)                    Patient  "over age 50, mutual decision to screen reflected in health maintenance.    Pertinent mammograms are reviewed under the imaging tab.  History of abnormal Pap smear:   Last 3 Pap and HPV Results:   PAP / HPV Latest Ref Rng & Units 6/13/2016 4/16/2012 11/25/2008   PAP - NIL NIL NIL   HPV 16 DNA NEG Negative - -   HPV 18 DNA NEG Negative - -   OTHER HR HPV NEG Negative - -     PAP / HPV Latest Ref Rng & Units 6/13/2016 4/16/2012 11/25/2008   PAP - NIL NIL NIL   HPV 16 DNA NEG Negative - -   HPV 18 DNA NEG Negative - -   OTHER HR HPV NEG Negative - -     Reviewed and updated as needed this visit by clinical staff  Tobacco  Allergies  Meds  Med Hx  Surg Hx  Fam Hx  Soc Hx        Reviewed and updated as needed this visit by Provider            Review of Systems  CONSTITUTIONAL: NEGATIVE for fever, chills, change in weight  INTEGUMENTARY/SKIN: NEGATIVE for worrisome rashes, moles or lesions  EYES: will need eye exam  ENT: NEGATIVE for ear, mouth and throat problems  RESP: NEGATIVE for significant cough or SOB  RESP:continues to smoke, no interest in quitting  BREAST: NEGATIVE for masses, tenderness or discharge  CV: NEGATIVE for chest pain, palpitations or peripheral edema  GI: NEGATIVE for nausea, abdominal pain, heartburn, or change in bowel habits  : NEGATIVE for unusual urinary or vaginal symptoms. No vaginal bleeding.  MUSCULOSKELETAL: NEGATIVE for significant arthralgias or myalgia, knee pain, improving now,   NEURO: NEGATIVE for weakness, dizziness or paresthesias  PSYCHIATRIC: NEGATIVE for changes in mood or affect      OBJECTIVE:   /72  Pulse 88  Temp 97.8  F (36.6  C) (Temporal)  Resp 12  Ht 5' 3.74\" (1.619 m)  Wt 117 lb 8 oz (53.3 kg)  LMP 02/01/2006  BMI 20.33 kg/m2  Physical Exam  GENERAL: healthy, alert and no distress  EYES: Eyes grossly normal to inspection, PERRL and conjunctivae and sclerae normal  HENT: ear canals and TM's normal, nose and mouth without ulcers or lesions  NECK: no " adenopathy, no asymmetry, masses, or scars and thyroid normal to palpation  RESP: lungs clear to auscultation - no rales, rhonchi or wheezes  BREAST: normal without masses, tenderness or nipple discharge and no palpable axillary masses or adenopathy  CV: regular rate and rhythm, normal S1 S2, no S3 or S4, no murmur, click or rub, no peripheral edema and peripheral pulses strong  ABDOMEN: soft, nontender, no hepatosplenomegaly, no masses and bowel sounds normal  MS: no gross musculoskeletal defects noted, no edema  SKIN: no suspicious lesions or rashes  NEURO: Normal strength and tone, mentation intact and speech normal  PSYCH: mentation appears normal, affect normal/bright    Diagnostic Test Results:  Results for orders placed or performed during the hospital encounter of 09/17/18   MA Screening Digital Bilateral    Narrative    MA SCREENING DIGITAL BILATERAL  With CAD 9/17/2018 2:44 PM    BREAST SYMPTOMS: No current breast complaints.    COMPARISON:  7/26/2017, 6/15/2016, 7/7/2014, 11/26/2012.    BREAST DENSITY: Scattered fibroglandular densities    COMMENTS: No findings of suspicion for malignancy.         Impression    IMPRESSION: BI-RADS CATEGORY: 1 -  NEGATIVE.    RECOMMENDED FOLLOW-UP: Annual Mammography    Exam results letter mailed to patient.         SANA MARK MD       ASSESSMENT/PLAN:   1. Encounter for routine adult health examination without abnormal findings  Smoking 58 year old female, she declines CT lung screening today    2. Screen for colon cancer  Pt over due for 1 year follow up  - GASTROENTEROLOGY ADULT REF PROCEDURE ONLY [facility] [phone]; [place] Provider    3. Tobacco use disorder  Not interested in quitting   - TOBACCO CESSATION ORDER FOR     4. Back pain, unspecified back location, unspecified back pain laterality, unspecified chronicity  Very limited use   - acetaminophen-codeine (TYLENOL W/CODEINE NO. 3) 300-30 MG per tablet; Take 1-2 tablets by mouth every 4 hours  "as needed  Dispense: 28 tablet; Refill: 0    COUNSELING:  Reviewed preventive health counseling, as reflected in patient instructions    BP Readings from Last 1 Encounters:   09/19/18 108/72     Estimated body mass index is 20.33 kg/(m^2) as calculated from the following:    Height as of this encounter: 5' 3.74\" (1.619 m).    Weight as of this encounter: 117 lb 8 oz (53.3 kg).           reports that she has been smoking Cigarettes.  She has a 25.00 pack-year smoking history. She has never used smokeless tobacco.  Tobacco Cessation Action Plan: Information offered: Patient not interested at this time    Counseling Resources:  ATP IV Guidelines  Pooled Cohorts Equation Calculator  Breast Cancer Risk Calculator  FRAX Risk Assessment  ICSI Preventive Guidelines  Dietary Guidelines for Americans, 2010  USDA's MyPlate  ASA Prophylaxis  Lung CA Screening    Robyn Chung PA-C  [facility]  Answers for HPI/ROS submitted by the patient on 9/19/2018   PHQ-2 Score: 0  Electronically signed by Robyn Chung PA-C   "

## 2018-09-19 NOTE — MR AVS SNAPSHOT
After Visit Summary   9/19/2018    Kandice Morton    MRN: [id number]           Patient Information     Date Of Birth          1960        Visit Information        Provider Department      9/19/2018 2:15 PM Robyn Chung PA-C [facility] [place]        Today's Diagnoses     Encounter for routine adult health examination without abnormal findings    -  1    Screen for colon cancer        Tobacco use disorder        Back pain, unspecified back location, unspecified back pain laterality, unspecified chronicity           Follow-ups after your visit        Additional Services     GASTROENTEROLOGY ADULT REF PROCEDURE ONLY [facility] [phone]; [facility] Provider       Last Lab Result: No results found for: CR  There is no height or weight on file to calculate BMI.     Needed:  No  Language:  English    Patient will be contacted to schedule procedure.     Please be aware that coverage of these services is subject to the terms and limitations of your health insurance plan.  Call member services at your health plan with any benefit or coverage questions.  Any procedures must be performed at a [facility] facility OR coordinated by your clinic's referral office.    Please bring the following with you to your appointment:    (1) Any X-Rays, CTs or MRIs which have been performed.  Contact the facility where they were done to arrange for  prior to your scheduled appointment.    (2) List of current medications   (3) This referral request   (4) Any documents/labs given to you for this referral                  Who to contact     If you have questions or need follow up information about today's clinic visit or your schedule please contact [facility] directly at [phone].  Normal or non-critical lab and imaging results will be communicated to you by MyChart, letter or phone within 4 business days after the clinic has received the results. If you do not hear  "from us within 7 days, please contact the clinic through [facility] or phone. If you have a critical or abnormal lab result, we will notify you by phone as soon as possible.  Submit refill requests through [employer] or call your pharmacy and they will forward the refill request to us. Please allow 3 business days for your refill to be completed.          Additional Information About Your Visit        Care EveryWhere ID     This is your Care EveryWhere ID. This could be used by other organizations to access your [place] medical records  [id number]        Your Vitals Were     Pulse Temperature Respirations Height Last Period BMI (Body Mass Index)    88 97.8  F (36.6  C) (Temporal) 12 5' 3.74\" (1.619 m) 02/01/2006 20.33 kg/m2       Blood Pressure from Last 3 Encounters:   09/19/18 108/72   01/16/18 111/74   01/02/18 112/72    Weight from Last 3 Encounters:   09/19/18 117 lb 8 oz (53.3 kg)   01/16/18 118 lb (53.5 kg)   01/02/18 118 lb 8 oz (53.8 kg)              We Performed the Following     GASTROENTEROLOGY ADULT REF PROCEDURE ONLY [facility] [phone]; [place] Provider     TOBACCO CESSATION ORDER FOR           Today's Medication Changes          These changes are accurate as of 9/19/18  2:41 PM.  If you have any questions, ask your nurse or doctor.               These medicines have changed or have updated prescriptions.        Dose/Directions    acetaminophen-codeine 300-30 MG per tablet   Commonly known as:  TYLENOL w/CODEINE No. 3   This may have changed:  reasons to take this   Used for:  Back pain, unspecified back location, unspecified back pain laterality, unspecified chronicity   Changed by:  Robyn Chung PA-C        Dose:  1-2 tablet   Take 1-2 tablets by mouth every 4 hours as needed   Quantity:  28 tablet   Refills:  0            Where to get your medicines      Some of these will need a paper prescription and others can be bought over the counter.  Ask your nurse if you have questions.  "    Bring a paper prescription for each of these medications     acetaminophen-codeine 300-30 MG per tablet               Information about OPIOIDS     PRESCRIPTION OPIOIDS: WHAT YOU NEED TO KNOW   We gave you an opioid (narcotic) pain medicine. It is important to manage your pain, but opioids are not always the best choice. You should first try all the other options your care team gave you. Take this medicine for as short a time (and as few doses) as possible.    Some activities can increase your pain, such as bandage changes or therapy sessions. It may help to take your pain medicine 30 to 60 minutes before these activities. Reduce your stress by getting enough sleep, working on hobbies you enjoy and practicing relaxation or meditation. Talk to your care team about ways to manage your pain beyond prescription opioids.    These medicines have risks:    DO NOT drive when on new or higher doses of pain medicine. These medicines can affect your alertness and reaction times, and you could be arrested for driving under the influence (DUI). If you need to use opioids long-term, talk to your care team about driving.    DO NOT operate heavy machinery    DO NOT do any other dangerous activities while taking these medicines.    DO NOT drink any alcohol while taking these medicines.     If the opioid prescribed includes acetaminophen, DO NOT take with any other medicines that contain acetaminophen. Read all labels carefully. Look for the word  acetaminophen  or  Tylenol.  Ask your pharmacist if you have questions or are unsure.    You can get addicted to pain medicines, especially if you have a history of addiction (chemical, alcohol or substance dependence). Talk to your care team about ways to reduce this risk.    All opioids tend to cause constipation. Drink plenty of water and eat foods that have a lot of fiber, such as fruits, vegetables, prune juice, apple juice and high-fiber cereal. Take a laxative (Miralax, milk of  magnesia, Colace, Senna) if you don t move your bowels at least every other day. Other side effects include upset stomach, sleepiness, dizziness, throwing up, tolerance (needing more of the medicine to have the same effect), physical dependence and slowed breathing.    Store your pills in a secure place, locked if possible. We will not replace any lost or stolen medicine. If you don t finish your medicine, please throw away (dispose) as directed by your pharmacist. The [place] Pollution Control Agency has more information about safe disposal: [email or web address]-medications         Primary Care Provider Office Phone # Fax #    Robyn Chung PA-C [phone] [address] [place] [address]        Equal Access to Services     [facility] BERNADETTE : Navin grajeda Somagnus, waaxda luqadaha, qaybta kaalmada emil, crystal gandhi . So [facility] [phone].    ATENCIÓN: Si habla español, tiene a castanon disposición servicios gratuitos de asistencia lingüística. Parag al [phone].    We comply with applicable federal civil rights laws and [place] laws. We do not discriminate on the basis of race, color, national origin, age, disability, sex, sexual orientation, or gender identity.            Thank you!     Thank you for choosing [facility]  for your care. Our goal is always to provide you with excellent care. Hearing back from our patients is one way we can continue to improve our services. Please take a few minutes to complete the written survey that you may receive in the mail after your visit with us. Thank you!             Your Updated Medication List - Protect others around you: Learn how to safely use, store and throw away your medicines at www.disposemymeds.org.          This list is accurate as of 9/19/18  2:41 PM.  Always use your most recent med list.                   Brand Name Dispense Instructions for  use Diagnosis    acetaminophen-codeine 300-30 MG per tablet    TYLENOL w/CODEINE No. 3    28 tablet    Take 1-2 tablets by mouth every 4 hours as needed    Back pain, unspecified back location, unspecified back pain laterality, unspecified chronicity       ASPIRIN NOT PRESCRIBED    INTENTIONAL    1 each    Antiplatelet medication not prescribed intentionally due to Hx of gastrointestinal or intracranial bleed    CARDIOVASCULAR SCREENING; LDL GOAL LESS THAN 130       meloxicam 15 MG tablet    MOBIC    30 tablet    Take 1 tablet (15 mg) by mouth daily as needed    Chronic pain of right knee       NAPROXEN PO      Take by mouth daily as needed for moderate pain

## 2018-09-24 ENCOUNTER — TELEPHONE (OUTPATIENT)
Dept: FAMILY MEDICINE | Facility: OTHER | Age: 58
End: 2018-09-24

## 2018-09-24 NOTE — LETTER
[facility]  [address]  [place] [id number]  [phone]        September 27, 2018    Kandice Morton  [address] [facility] [address]

## 2018-09-24 NOTE — LETTER

## 2018-09-24 NOTE — TELEPHONE ENCOUNTER
Left message for patient to return call to schedule colonoscopy or EGD. If Gail or Naomie are unavailable, please transfer to the surgery center.

## 2018-09-27 NOTE — TELEPHONE ENCOUNTER
Changed to 11:15am with Dr. Cedeño. Notified patient and she was OK with this change. I sent out a new packet because she was driving.

## 2018-09-27 NOTE — TELEPHONE ENCOUNTER
Date of colonoscopy/EGD: 10/30/18  Surgeon: Dr. Ventura  Prep:Miralax  Packet:Colonoscopy/EGD instructions mailed to patient's home address.   Date: 9/27/2018      Surgery Scheduler

## 2018-10-18 NOTE — TELEPHONE ENCOUNTER
Patient called to reschedule colonoscopy to 11/6/18 at 2:30pm with Dr. Ventura. Notified Norah in the OR.

## 2018-11-05 NOTE — H&P
[facility] History and Physical    Kandice Morton MRN# [id number]   Age: 58 year old YOB: 1960     Date of Admission:  (Not on file)    Home clinic: [facility]  Primary care provider: Robyn Chung          Impression and Plan:   Impression:   Screen for colon cancer, history of polyps  - last colonoscopy 2017      Plan:   Proceed to colonoscopy as planned the procedure, risks, benefits and alternatives were discussed and she agrees to proceed.  CLeared for anesthesia           Chief Complaint:   Screen for colon cancer    History is obtained from the patient          History of Present Illness:   This 58 year old female with a hx of colonic polyps.  Last colonoscopy 03/17.  Concern about polyp ascending colon.  Areas was tatooed.  [facility] recommended 1 year f/u.  Pt here for repeat colonoscopy           Past Medical History:     Past Medical History:   Diagnosis Date     Genital herpes, unspecified      Subdural hemorrhage following injury, without mention of open intracranial wound, unspecified state of consciousness 1985     Tobacco use disorder      TRAUMATIC SUBDURAL HEMORRH 12/27/2006            Past Surgical History:     Past Surgical History:   Procedure Laterality Date     C OPEN SKULL EVAC HEMATOMA, SUPRATENTORIAL, SUB/ EXTRADURAL  1985            Social History:     Social History   Substance Use Topics     Smoking status: Current Every Day Smoker     Packs/day: 1.00     Years: 25.00     Types: Cigarettes     Smokeless tobacco: Never Used     Alcohol use Yes      Comment: occasionally            Family History:     Family History   Problem Relation Age of Onset     Diabetes Mother      Type II     Neurologic Disorder Mother      Migraines     Genitourinary Problems Mother      Colitis- ulcerative     Hypertension Father      Diabetes Father      Type II     Cancer Father      lung            Immunizations:     VACCINE/DOSE   Diptheria   DPT   DTAP   HBIG    Hepatitis A   Hepatitis B   HIB   Influenza   Measles   Meningococcal   MMR   Mumps   Pneumococcal   Polio   Rubella   Small Pox   TDAP   Varicella   Zoster            Allergies:     Allergies   Allergen Reactions     Sulfa Drugs             Medications:     No current facility-administered medications for this encounter.      Current Outpatient Prescriptions   Medication Sig     acetaminophen-codeine (TYLENOL W/CODEINE NO. 3) 300-30 MG per tablet Take 1-2 tablets by mouth every 4 hours as needed     NAPROXEN PO Take by mouth daily as needed for moderate pain     ASPIRIN NOT PRESCRIBED (INTENTIONAL) Antiplatelet medication not prescribed intentionally due to Hx of gastrointestinal or intracranial bleed (Patient not taking: Reported on 7/24/2017)     meloxicam (MOBIC) 15 MG tablet Take 1 tablet (15 mg) by mouth daily as needed (Patient not taking: Reported on 1/2/2018)             Review of Systems:   The review of systems was positive for the following findings.  Hx of polyps.  The remainder of the review of systems was unremarkable.          Physical Exam:   All vitals have been reviewed  Last menstrual period 02/01/2006, not currently breastfeeding.  No intake or output data in the 24 hours ending 11/05/18 0821  SHEENT examination revealed NC/AT, EOMI, (-)icterus  Examination of the chest revealed CTA  Examination of the heart revealed S1, S2, (-)m/r/g  Examination of the abdomen revealed soft, non tender, non distended  The neuromuscular examination was WNL          Data:   All laboratory data reviewed  Results for orders placed or performed during the hospital encounter of 09/17/18   MA Screening Digital Bilateral    Narrative    MA SCREENING DIGITAL BILATERAL  With CAD 9/17/2018 2:44 PM    BREAST SYMPTOMS: No current breast complaints.    COMPARISON:  7/26/2017, 6/15/2016, 7/7/2014, 11/26/2012.    BREAST DENSITY: Scattered fibroglandular densities    COMMENTS: No findings of suspicion for malignancy.          Impression    IMPRESSION: BI-RADS CATEGORY: 1 -  NEGATIVE.    RECOMMENDED FOLLOW-UP: Annual Mammography    Exam results letter mailed to patient.         MD Benja PEDROZA MD, FACS

## 2018-11-06 ENCOUNTER — ANESTHESIA EVENT (OUTPATIENT)
Dept: GASTROENTEROLOGY | Facility: CLINIC | Age: 58
End: 2018-11-06
Payer: COMMERCIAL

## 2018-11-06 ENCOUNTER — HOSPITAL ENCOUNTER (OUTPATIENT)
Facility: CLINIC | Age: 58
Discharge: HOME | End: 2018-11-06
Attending: SPECIALIST | Admitting: SPECIALIST
Payer: COMMERCIAL

## 2018-11-06 ENCOUNTER — ANESTHESIA (OUTPATIENT)
Dept: GASTROENTEROLOGY | Facility: CLINIC | Age: 58
End: 2018-11-06
Payer: COMMERCIAL

## 2018-11-06 ENCOUNTER — SURGERY (OUTPATIENT)
Age: 58
End: 2018-11-06

## 2018-11-06 VITALS
DIASTOLIC BLOOD PRESSURE: 82 MMHG | OXYGEN SATURATION: 99 % | TEMPERATURE: 98.1 F | RESPIRATION RATE: 16 BRPM | SYSTOLIC BLOOD PRESSURE: 129 MMHG

## 2018-11-06 LAB — COLONOSCOPY: NORMAL

## 2018-11-06 PROCEDURE — 25000125 ZZHC RX 250: Performed by: NURSE ANESTHETIST, CERTIFIED REGISTERED

## 2018-11-06 PROCEDURE — 88305 TISSUE EXAM BY PATHOLOGIST: CPT | Mod: 26 | Performed by: SPECIALIST

## 2018-11-06 PROCEDURE — 45380 COLONOSCOPY AND BIOPSY: CPT | Mod: PT | Performed by: SPECIALIST

## 2018-11-06 PROCEDURE — 25000128 H RX IP 250 OP 636: Performed by: NURSE ANESTHETIST, CERTIFIED REGISTERED

## 2018-11-06 PROCEDURE — 37000009 ZZH ANESTHESIA TECHNICAL FEE, EACH ADDTL 15 MIN: Performed by: SPECIALIST

## 2018-11-06 PROCEDURE — 45380 COLONOSCOPY AND BIOPSY: CPT | Performed by: SPECIALIST

## 2018-11-06 PROCEDURE — 37000008 ZZH ANESTHESIA TECHNICAL FEE, 1ST 30 MIN: Performed by: SPECIALIST

## 2018-11-06 PROCEDURE — 88305 TISSUE EXAM BY PATHOLOGIST: CPT | Performed by: SPECIALIST

## 2018-11-06 PROCEDURE — 40000296 ZZH STATISTIC ENDO RECOVERY CLASS 1:2 FIRST HOUR: Performed by: SPECIALIST

## 2018-11-06 RX ORDER — LIDOCAINE 40 MG/G
CREAM TOPICAL
Status: DISCONTINUED | OUTPATIENT
Start: 2018-11-06 | End: 2018-11-06 | Stop reason: HOSPADM

## 2018-11-06 RX ORDER — MEPERIDINE HYDROCHLORIDE 25 MG/ML
12.5 INJECTION INTRAMUSCULAR; INTRAVENOUS; SUBCUTANEOUS
Status: CANCELLED | OUTPATIENT
Start: 2018-11-06

## 2018-11-06 RX ORDER — SODIUM CHLORIDE, SODIUM LACTATE, POTASSIUM CHLORIDE, CALCIUM CHLORIDE 600; 310; 30; 20 MG/100ML; MG/100ML; MG/100ML; MG/100ML
INJECTION, SOLUTION INTRAVENOUS CONTINUOUS
Status: CANCELLED | OUTPATIENT
Start: 2018-11-06

## 2018-11-06 RX ORDER — PROPOFOL 10 MG/ML
INJECTION, EMULSION INTRAVENOUS CONTINUOUS PRN
Status: DISCONTINUED | OUTPATIENT
Start: 2018-11-06 | End: 2018-11-06

## 2018-11-06 RX ORDER — PROPOFOL 10 MG/ML
INJECTION, EMULSION INTRAVENOUS PRN
Status: DISCONTINUED | OUTPATIENT
Start: 2018-11-06 | End: 2018-11-06

## 2018-11-06 RX ORDER — ONDANSETRON 4 MG/1
4 TABLET, ORALLY DISINTEGRATING ORAL EVERY 6 HOURS PRN
Status: CANCELLED | OUTPATIENT
Start: 2018-11-06

## 2018-11-06 RX ORDER — ONDANSETRON 2 MG/ML
4 INJECTION INTRAMUSCULAR; INTRAVENOUS EVERY 30 MIN PRN
Status: CANCELLED | OUTPATIENT
Start: 2018-11-06

## 2018-11-06 RX ORDER — SODIUM CHLORIDE, SODIUM LACTATE, POTASSIUM CHLORIDE, CALCIUM CHLORIDE 600; 310; 30; 20 MG/100ML; MG/100ML; MG/100ML; MG/100ML
INJECTION, SOLUTION INTRAVENOUS CONTINUOUS
Status: DISCONTINUED | OUTPATIENT
Start: 2018-11-06 | End: 2018-11-06 | Stop reason: HOSPADM

## 2018-11-06 RX ORDER — FLUMAZENIL 0.1 MG/ML
0.2 INJECTION, SOLUTION INTRAVENOUS
Status: CANCELLED | OUTPATIENT
Start: 2018-11-06 | End: 2018-11-07

## 2018-11-06 RX ORDER — ONDANSETRON 2 MG/ML
4 INJECTION INTRAMUSCULAR; INTRAVENOUS EVERY 6 HOURS PRN
Status: CANCELLED | OUTPATIENT
Start: 2018-11-06

## 2018-11-06 RX ORDER — NALOXONE HYDROCHLORIDE 0.4 MG/ML
.1-.4 INJECTION, SOLUTION INTRAMUSCULAR; INTRAVENOUS; SUBCUTANEOUS
Status: CANCELLED | OUTPATIENT
Start: 2018-11-06 | End: 2018-11-07

## 2018-11-06 RX ORDER — ONDANSETRON 4 MG/1
4 TABLET, ORALLY DISINTEGRATING ORAL EVERY 30 MIN PRN
Status: CANCELLED | OUTPATIENT
Start: 2018-11-06

## 2018-11-06 RX ORDER — SODIUM CHLORIDE 9 MG/ML
INJECTION, SOLUTION INTRAVENOUS CONTINUOUS
Status: DISCONTINUED | OUTPATIENT
Start: 2018-11-06 | End: 2018-11-06 | Stop reason: HOSPADM

## 2018-11-06 RX ORDER — LIDOCAINE HYDROCHLORIDE 20 MG/ML
INJECTION, SOLUTION INFILTRATION; PERINEURAL PRN
Status: DISCONTINUED | OUTPATIENT
Start: 2018-11-06 | End: 2018-11-06

## 2018-11-06 RX ADMIN — LIDOCAINE HYDROCHLORIDE 2 ML: 10 INJECTION, SOLUTION EPIDURAL; INFILTRATION; INTRACAUDAL; PERINEURAL at 14:14

## 2018-11-06 RX ADMIN — PROPOFOL 30 MG: 10 INJECTION, EMULSION INTRAVENOUS at 14:35

## 2018-11-06 RX ADMIN — PROPOFOL 40 MG: 10 INJECTION, EMULSION INTRAVENOUS at 14:28

## 2018-11-06 RX ADMIN — SODIUM CHLORIDE, POTASSIUM CHLORIDE, SODIUM LACTATE AND CALCIUM CHLORIDE: 600; 310; 30; 20 INJECTION, SOLUTION INTRAVENOUS at 14:14

## 2018-11-06 RX ADMIN — LIDOCAINE HYDROCHLORIDE 60 MG: 20 INJECTION, SOLUTION INFILTRATION; PERINEURAL at 14:28

## 2018-11-06 RX ADMIN — PROPOFOL 150 MCG/KG/MIN: 10 INJECTION, EMULSION INTRAVENOUS at 14:28

## 2018-11-06 ASSESSMENT — LIFESTYLE VARIABLES: TOBACCO_USE: 1

## 2018-11-06 NOTE — ANESTHESIA CARE TRANSFER NOTE
Patient: Kandice Morton    Procedure(s):  COMBINED COLONOSCOPY with polypectomy by force    Diagnosis: Screen for colon cancer  Diagnosis Additional Information: No value filed.    Anesthesia Type:   MAC     Note:  Airway :Room Air  Patient transferred to:Phase II  Handoff Report: Identifed the Patient, Identified the Reponsible Provider, Reviewed the pertinent medical history, Discussed the surgical course, Reviewed Intra-OP anesthesia mangement and issues during anesthesia, Set expectations for post-procedure period and Allowed opportunity for questions and acknowledgement of understanding      Vitals: (Last set prior to Anesthesia Care Transfer)    CRNA VITALS  11/6/2018 1426 - 11/6/2018 1500      11/6/2018             Resp Rate (observed): (!)  33                Electronically Signed By: IDRIS Lai CRNA  November 6, 2018  3:00 PM

## 2018-11-06 NOTE — DISCHARGE INSTRUCTIONS
[facility]    Home Care Following Endoscopy          Activity:    You have just undergone an endoscopic procedure usually performed with conscious sedation.  Do not work or operate machinery (including a car) for at least 12 hours.      I encourage you to walk and attempt to pass this air as soon as possible.    Diet:    Return to the diet you were on before your procedure but eat lightly for the first 12-24 hours.    Drink plenty of water.    Resume any regular medications unless otherwise advised by your physician.  Please begin any new medication prescribed as a result of your procedure as directed by your physician.     If you had any biopsy or polyp removed please refrain from aspirin or aspirin products for 2 days.  If on Coumadin please restart as instructed by your physician.   Pain:    You may take Tylenol as needed for pain.  Expected Recovery:    You can expect some mild abdominal fullness and/or discomfort due to the air used to inflate your intestinal tract. It is also normal to have a mild sore throat after upper endoscopy.    Call Your Physician if You Have:    After Colonoscopy:  o Worsening persisting abdominal pain which is worse with activity.  o Fevers (>101 degrees F), chills or shakes.  o Passage of continued blood with bowel movements.   Any questions or concerns about your recovery, please call [phone] or after hours [phone] Nurse Advice Line.    Follow-up Care:    You should receive a call or letter with your results within 1 week. Please call if you have not received a notification of your results.  [facility]   Adult Discharge Orders & Instructions     For 24 hours after surgery    1. Get plenty of rest.  A responsible adult must stay with you for at least 24 hours after you leave the hospital.   2. Do not drive or use heavy equipment.  If you have weakness or tingling, don't drive or use heavy equipment until this feeling goes away.  3. Do not  drink alcohol.  4. Avoid strenuous or risky activities.  Ask for help when climbing stairs.   5. You may feel lightheaded.  IF so, sit for a few minutes before standing.  Have someone help you get up.   6. If you have nausea (feel sick to your stomach): Drink only clear liquids such as apple juice, ginger ale, broth or 7-Up.  Rest may also help.  Be sure to drink enough fluids.  Move to a regular diet as you feel able.  7. You may have a slight fever. Call the doctor if your fever is over 100 F (37.7 C) (taken under the tongue) or lasts longer than 24 hours.  8. You may have a dry mouth, a sore throat, muscle aches or trouble sleeping.  These should go away after 24 hours.  9. Do not make important or legal decisions.   Call your doctor for any of the followin.  Signs of infection (fever, growing tenderness at the surgery site, a large amount of drainage or bleeding, severe pain, foul-smelling drainage, redness, swelling).    2. It has been over 8 to 10 hours since surgery and you are still not able to urinate (pass water).    3.  Headache for over 24 hours.

## 2018-11-06 NOTE — ANESTHESIA POSTPROCEDURE EVALUATION
Patient: Kandice Morton    Procedure(s):  COMBINED COLONOSCOPY with polypectomy by [facility]    Diagnosis:Screen for colon cancer  Diagnosis Additional Information: No value filed.    Anesthesia Type:  MAC    Note:  Anesthesia Post Evaluation    Patient location during evaluation: Phase 2  Patient participation: Able to fully participate in evaluation  Level of consciousness: awake and alert  Pain management: adequate  Airway patency: patent  Cardiovascular status: blood pressure returned to baseline  Respiratory status: spontaneous ventilation and room air  Hydration status: acceptable  PONV: none     Anesthetic complications: None    Comments: Patient resting without complaint. No anesthesia concerns.         Last vitals:  Vitals:    11/06/18 1402 11/06/18 1520 11/06/18 1530   BP: 126/69 115/81 129/82   Resp: 16     Temp: 98.1  F (36.7  C)     SpO2: 99%           Electronically Signed By: IDRIS Lai CRNA  November 6, 2018  3:40 PM

## 2018-11-06 NOTE — IP AVS SNAPSHOT
MRN:[id number]                      After Visit Summary   11/6/2018    Kandice Morton    MRN: [id number]           Thank you!     Thank you for choosing [facility] for your care. Our goal is always to provide you with excellent care. Hearing back from our patients is one way we can continue to improve our services. Please take a few minutes to complete the written survey that you may receive in the mail after you visit with us. Thank you!        Patient Information     Date Of Birth          1960        About your hospital stay     You were admitted on:  November 6, 2018 You last received care in the:  [facility]    You were discharged on:  November 6, 2018       Who to Call     For medical emergencies, please call 911.  For non-urgent questions about your medical care, please call your primary care provider or clinic, [phone]  For questions related to your surgery, please call your surgery clinic        Attending Provider     Provider Specialty    Benja Ventura MD General Surgery       Primary Care Provider Office Phone # Fax #    Robyn Chung PA-C [phone]      After Care Instructions     Discharge Instructions       Resume pre procedure diet            Discharge Instructions       Restart home medications.                  Further instructions from your care team         [facility]    Home Care Following Endoscopy          Activity:    You have just undergone an endoscopic procedure usually performed with conscious sedation.  Do not work or operate machinery (including a car) for at least 12 hours.      I encourage you to walk and attempt to pass this air as soon as possible.    Diet:    Return to the diet you were on before your procedure but eat lightly for the first 12-24 hours.    Drink plenty of water.    Resume any regular medications unless otherwise advised by your physician.  Please begin any new medication prescribed as  a result of your procedure as directed by your physician.     If you had any biopsy or polyp removed please refrain from aspirin or aspirin products for 2 days.  If on Coumadin please restart as instructed by your physician.   Pain:    You may take Tylenol as needed for pain.  Expected Recovery:    You can expect some mild abdominal fullness and/or discomfort due to the air used to inflate your intestinal tract. It is also normal to have a mild sore throat after upper endoscopy.    Call Your Physician if You Have:    After Colonoscopy:  o Worsening persisting abdominal pain which is worse with activity.  o Fevers (>101 degrees F), chills or shakes.  o Passage of continued blood with bowel movements.   Any questions or concerns about your recovery, please call [phone] or after hours [phone] Nurse Advice Line.    Follow-up Care:    You should receive a call or letter with your results within 1 week. Please call if you have not received a notification of your results.  [facility]   Adult Discharge Orders & Instructions     For 24 hours after surgery    1. Get plenty of rest.  A responsible adult must stay with you for at least 24 hours after you leave the hospital.   2. Do not drive or use heavy equipment.  If you have weakness or tingling, don't drive or use heavy equipment until this feeling goes away.  3. Do not drink alcohol.  4. Avoid strenuous or risky activities.  Ask for help when climbing stairs.   5. You may feel lightheaded.  IF so, sit for a few minutes before standing.  Have someone help you get up.   6. If you have nausea (feel sick to your stomach): Drink only clear liquids such as apple juice, ginger ale, broth or 7-Up.  Rest may also help.  Be sure to drink enough fluids.  Move to a regular diet as you feel able.  7. You may have a slight fever. Call the doctor if your fever is over 100 F (37.7 C) (taken under the tongue) or lasts longer than 24 hours.  8. You may have a dry  mouth, a sore throat, muscle aches or trouble sleeping.  These should go away after 24 hours.  9. Do not make important or legal decisions.   Call your doctor for any of the followin.  Signs of infection (fever, growing tenderness at the surgery site, a large amount of drainage or bleeding, severe pain, foul-smelling drainage, redness, swelling).    2. It has been over 8 to 10 hours since surgery and you are still not able to urinate (pass water).    3.  Headache for over 24 hours.      Pending Results     No orders found from 2018 to 2018.            Admission Information     Date & Time Provider Department Dept. Phone    2018 Benja Ventura MD [facility] Endoscopy [phone]      Your Vitals Were     Blood Pressure Temperature Respirations Last Period Pulse Oximetry       126/69 98.1  F (36.7  C) (Oral) 16 2006 99%       Care EveryWhere ID     This is your Care EveryWhere ID. This could be used by other organizations to access your [place] medical records  [id number]        Equal Access to Services     [facility]: Hadii guillermina grajeda Somagnus, waaxda luqadaha, qaybta kaalmamaddy adekaren, crystal gandhi . So [facility] [phone].    ATENCIÓN: Si habla español, tiene a castanon disposición servicios gratuitos de asistencia lingüística. Parag al [phone].    We comply with applicable federal civil rights laws and [place] laws. We do not discriminate on the basis of race, color, national origin, age, disability, sex, sexual orientation, or gender identity.               Review of your medicines      CONTINUE these medicines which have NOT CHANGED        Dose / Directions    acetaminophen-codeine 300-30 MG per tablet   Commonly known as:  TYLENOL w/CODEINE No. 3   Used for:  Back pain, unspecified back location, unspecified back pain laterality, unspecified chronicity        Dose:  1-2 tablet   Take 1-2 tablets by mouth every 4 hours as needed    Quantity:  28 tablet   Refills:  0       ASPIRIN NOT PRESCRIBED   Commonly known as:  INTENTIONAL   Used for:  CARDIOVASCULAR SCREENING; LDL GOAL LESS THAN 130        Antiplatelet medication not prescribed intentionally due to Hx of gastrointestinal or intracranial bleed   Quantity:  1 each   Refills:  0       meloxicam 15 MG tablet   Commonly known as:  MOBIC   Used for:  Chronic pain of right knee        Dose:  15 mg   Take 1 tablet (15 mg) by mouth daily as needed   Quantity:  30 tablet   Refills:  0       NAPROXEN PO        Take by mouth daily as needed for moderate pain   Refills:  0                Protect others around you: Learn how to safely use, store and throw away your medicines at www.disposemymeds.org.             Medication List: This is a list of all your medications and when to take them. Check marks below indicate your daily home schedule. Keep this list as a reference.      Medications           Morning Afternoon Evening Bedtime As Needed    acetaminophen-codeine 300-30 MG per tablet   Commonly known as:  TYLENOL w/CODEINE No. 3   Take 1-2 tablets by mouth every 4 hours as needed                                ASPIRIN NOT PRESCRIBED   Commonly known as:  INTENTIONAL   Antiplatelet medication not prescribed intentionally due to Hx of gastrointestinal or intracranial bleed                                meloxicam 15 MG tablet   Commonly known as:  MOBIC   Take 1 tablet (15 mg) by mouth daily as needed                                NAPROXEN PO   Take by mouth daily as needed for moderate pain

## 2018-11-06 NOTE — ANESTHESIA PREPROCEDURE EVALUATION
Anesthesia Evaluation     . Pt has had prior anesthetic. Type: General           ROS/MED HX    ENT/Pulmonary:     (+)tobacco use, Current use , . .    Neurologic:       Cardiovascular:     (+) Dyslipidemia, ----. : . . . :. .       METS/Exercise Tolerance:     Hematologic:         Musculoskeletal:   (+) , , other musculoskeletal- chronic neck pain      GI/Hepatic: Comment: History of benign colon polyps.         Renal/Genitourinary:  - ROS Renal section negative   (+) Pt has no history of transplant,       Endo:  - neg endo ROS       Psychiatric:         Infectious Disease: Comment: Genital herpes         Malignancy:      - no malignancy   Other:    (+) No chance of pregnancy C-spine cleared: N/A, no H/O Chronic Pain,                   Physical Exam  Normal systems: cardiovascular, pulmonary and dental    Airway   Mallampati: II  TM distance: >3 FB  Neck ROM: full    Dental     Cardiovascular   Rhythm and rate: regular and normal      Pulmonary    breath sounds clear to auscultation                    Anesthesia Plan      History & Physical Review  History and physical reviewed and following examination; no interval change.    ASA Status:  2 .    NPO Status:  > 8 hours    Plan for MAC with Intravenous and Propofol induction. Maintenance will be TIVA.  Reason for MAC:  Deep or markedly invasive procedure (G8)         Postoperative Care  Postoperative pain management:  Oral pain medications.      Consents  Anesthetic plan, risks, benefits and alternatives discussed with:  Patient..                          .

## 2018-11-06 NOTE — IP AVS SNAPSHOT
[facility]    [address] [facility] [address]    Phone:  [phone]                                       After Visit Summary   11/6/2018    Kandice Morton    MRN: [id number]           After Visit Summary Signature Page     I have received my discharge instructions, and my questions have been answered. I have discussed any challenges I see with this plan with the nurse or doctor.    ..........................................................................................................................................  Patient/Patient Representative Signature      ..........................................................................................................................................  Patient Representative Print Name and Relationship to Patient    ..................................................               ................................................  Date                                   Time    ..........................................................................................................................................  Reviewed by Signature/Title    ...................................................              ..............................................  Date                                               Time          22EPIC Rev 08/18

## 2018-11-08 LAB — COPATH REPORT: NORMAL

## 2019-07-08 ENCOUNTER — TELEPHONE (OUTPATIENT)
Dept: FAMILY MEDICINE | Facility: OTHER | Age: 59
End: 2019-07-08

## 2019-07-08 NOTE — TELEPHONE ENCOUNTER
I can see her weds at [phone] if she can wait otherwise she can see one of my partners.  Robyn Chung PA-C

## 2019-07-08 NOTE — TELEPHONE ENCOUNTER
Reason for Call:  Other appointment    Detailed comments: pt needing to be seen for ED follow up-she fell last Thursday- requesting Robyn Chung either today or tomorrow. Please advise.     Phone Number Patient can be reached at: Home number on file [phone] (home)    Best Time: any     Can we leave a detailed message on this number? YES    Call taken on 7/8/2019 at 11:56 AM by Yoly Guerra

## 2019-07-08 NOTE — PROGRESS NOTES
Subjective     Kandice Morton is a 59 year old female who presents to clinic today for the following health issues:  The 10-year ASCVD risk score (Gloriacarmen PRYOR Jr., et al., 2013) is: 3.3%    Values used to calculate the score:      Age: 59 years      Sex: Female      Is Non- : No      Diabetic: No      Tobacco smoker: Yes      Systolic Blood Pressure: 112 mmHg      Is BP treated: No      HDL Cholesterol: 94 mg/dL      Total Cholesterol: 197 mg/dL  Patient is eligible for use of low-dose aspirin for primary prevention of heart attack and stroke.  Provider has discussed aspirin with patient and our decision was:     Not Indicated:  Daily low-dose aspirin recommended for primary prevention, patient not eligible.        HPI   ED/UC Followup:    Facility:  [facility]  Date of visit: 7/7/19 and 7/8/19 also seen at the [facility] clinic on 5 July  Reason for visit: Left-sided chest wall pain (Primary Dx);   Fall from slip, trip, or stumble, initial encounter;   Rib pain on left side and chest pain unspecified   Current Status: pain is doing a little better, out of Norco and felt like this was helping control pain for about 30-90 minutes of the 4 hour cycle, lower back pain, and can she take tylenol or naproxen for pain or some tape?     Patient had a fall the night of July 4 at her son's home.  She was attempting to sit on the toilet and fell to the side hitting her left rib cage on the edge of the bathtub.  She was seen the next day in [place] at the clinic.  X-rays did not reveal any fractures they gave her a Toradol injection and tramadol.  Tramadol seem to give her chills and made her feel clammy.  She did not care for it.  She tried Tylenol 3 that she had at home but it really was not very helpful.  She went to the ER on July 7 for something better for pain control.  She was given hydrocodone which was helpful.  She is actually doing a little bit better but the evening of  "July 8 she started to have a significant left chest pain.  She never had anything like that before and since she lives alone she became concerned and went in for ER evaluation.  She was ruled out for cardiac causes and PE.  She was struggling with constipation so they recommended MiraLAX to her as well.  She is here today because she is out of pain meds and she needs a recheck in clinic    She is working hard to take deep breaths her constipation is starting to get better, she had a small bowel movement today.  Her appetite is reduced this could be due to pain or constipation.  She continues to take MiraLAX.  She has had no fevers hematuria, or coughing.  She is wondering if I can give her a note taking her out of work.  She is a  at the [employer] she works Thursday Friday Saturday Sunday.        BP Readings from Last 3 Encounters:   07/10/19 112/62   11/06/18 129/82   09/19/18 108/72    Wt Readings from Last 3 Encounters:   07/10/19 50.8 kg (112 lb)   09/19/18 53.3 kg (117 lb 8 oz)   01/16/18 53.5 kg (118 lb)                    Reviewed and updated as needed this visit by Provider  Tobacco  Allergies  Meds  Problems  Med Hx  Surg Hx  Fam Hx         Review of Systems   ROS COMP: CONSTITUTIONAL: NEGATIVE for fever, chills, change in weight  ENT/MOUTH: NEGATIVE for ear, mouth and throat problems  RESP: NEGATIVE for significant cough or SOB  CV: NEGATIVE for chest pain, palpitations or peripheral edema  CV: Left-sided chest pain has improved   GI: The patient is starting to improve small a.m. today last BM was July 4 previous to this  MS-always has low back pain but wonders if the back pain is a result of constipation and/or her rib contusions  PSYCHIATRIC: NEGATIVE for changes in mood or affect      Objective    /62   Pulse 84   Temp 98.6  F (37  C) (Temporal)   Resp 16   Ht 1.64 m (5' 4.57\")   Wt 50.8 kg (112 lb)   LMP 02/01/2006 (Approximate)   SpO2 100%   BMI 18.89 kg/m    Body " mass index is 18.89 kg/m .  Physical Exam   GENERAL: healthy, alert and no distress  NECK: no adenopathy, no asymmetry, masses, or scars and thyroid normal to palpation  RESP: lungs clear to auscultation - no rales, rhonchi or wheezes  RESP: Chest wall tenderness overlying the ribs on the left anterior and lateral aspects no palpable step-offs no overlying ecchymosis  CV: regular rate and rhythm, normal S1 S2, no S3 or S4, no murmur, click or rub, no peripheral edema and peripheral pulses strong  ABDOMEN: soft, nontender, no hepatosplenomegaly, no masses and bowel sounds normal  MS: no gross musculoskeletal defects noted, no edema  PSYCH: mentation appears normal, affect normal/bright    Diagnostic Test Results:  Labs reviewed in Epic  none         Assessment & Plan   [place] database was reviewed her prescriptions of tramadol and [place] from [facility] were noted no other pain prescriptions other than her annual Tylenol with codeine from myself.      2. Rib contusion, left, initial encounter  See AVS for further information but encouraged her to continue with her deep breathing I took her out of work for a week if she needs further pain meds which I do not anticipate she will need office visit she is aware of this  - HYDROcodone-acetaminophen (NORCO) 5-325 MG tablet; Take 1 tablet by mouth every 6 hours as needed for severe pain  Dispense: 40 tablet; Refill: 0     Tobacco Cessation:   reports that she has been smoking cigarettes.  She has a 25.00 pack-year smoking history. She has never used smokeless tobacco.  Tobacco Cessation Action Plan: Information offered: Patient not interested at this time        This chart documentation was completed in part with Dragon voice recognition software.  Documentation is reviewed after completion, however, some words and grammatical errors may remain.  Robyn Chung PA-C      No follow-ups on file.    Robyn Chung PA-C  [facility]

## 2019-07-10 ENCOUNTER — OFFICE VISIT (OUTPATIENT)
Dept: FAMILY MEDICINE | Facility: OTHER | Age: 59
End: 2019-07-10
Payer: COMMERCIAL

## 2019-07-10 VITALS
RESPIRATION RATE: 16 BRPM | OXYGEN SATURATION: 100 % | SYSTOLIC BLOOD PRESSURE: 112 MMHG | TEMPERATURE: 98.6 F | DIASTOLIC BLOOD PRESSURE: 62 MMHG | BODY MASS INDEX: 18.66 KG/M2 | HEIGHT: 65 IN | WEIGHT: 112 LBS | HEART RATE: 84 BPM

## 2019-07-10 DIAGNOSIS — S20.212A RIB CONTUSION, LEFT, INITIAL ENCOUNTER: ICD-10-CM

## 2019-07-10 DIAGNOSIS — Z12.4 SCREENING FOR MALIGNANT NEOPLASM OF CERVIX: Primary | ICD-10-CM

## 2019-07-10 PROCEDURE — 99214 OFFICE O/P EST MOD 30 MIN: CPT | Performed by: PHYSICIAN ASSISTANT

## 2019-07-10 RX ORDER — HYDROCODONE BITARTRATE AND ACETAMINOPHEN 5; 325 MG/1; MG/1
1 TABLET ORAL EVERY 6 HOURS PRN
Qty: 40 TABLET | Refills: 0 | Status: SHIPPED | OUTPATIENT
Start: 2019-07-10 | End: 2019-09-24

## 2019-07-10 RX ORDER — HYDROCODONE BITARTRATE AND ACETAMINOPHEN 5; 325 MG/1; MG/1
1 TABLET ORAL EVERY 6 HOURS PRN
COMMUNITY
End: 2019-07-10

## 2019-07-10 ASSESSMENT — PAIN SCALES - GENERAL: PAINLEVEL: SEVERE PAIN (6)

## 2019-07-10 ASSESSMENT — MIFFLIN-ST. JEOR: SCORE: 1077.03

## 2019-07-10 NOTE — LETTER
July 10, 2019      Kandice Morton  [address] [facility] [address]        To Whom It May Concern:    Kandice Morton was seen on July 10, 2019 .  Please excuse her from work due to injury.  Please excuse her July 10, 2019 - July 17,2019.      Sincerely,        Robyn Chung PA-C

## 2019-07-10 NOTE — PATIENT INSTRUCTIONS
Keep taking your deep breaths, let us know at once if you have increased pain, worsening chest pain, fevers, chills, and coughing.    If you need more pain meds, please make an appointment, we cannot refill this over the phone.  Robyn Chung PA-C

## 2019-09-04 NOTE — PROGRESS NOTES
SUBJECTIVE:   CC: Kandice Morton is an 59 year old woman who presents for preventive health visit.     Healthy Habits:     Getting at least 3 servings of Calcium per day:  NO    Bi-annual eye exam:  Yes    Dental care twice a year:  Yes    Sleep apnea or symptoms of sleep apnea:  None    Diet:  Regular (no restrictions)    Frequency of exercise:  None    Taking medications regularly:  Not Applicable    Medication side effects:  Not applicable    PHQ-2 Total Score: 0    Additional concerns today:  No              Today's PHQ-2 Score:   PHQ-2 ( 1999 Pfizer) 9/24/2019   Q1: Little interest or pleasure in doing things 0   Q2: Feeling down, depressed or hopeless 0   PHQ-2 Score 0   Q1: Little interest or pleasure in doing things Not at all   Q2: Feeling down, depressed or hopeless Not at all   PHQ-2 Score 0       Abuse: Current or Past(Physical, Sexual or Emotional)- No  Do you feel safe in your environment? Yes    Social History     Tobacco Use     Smoking status: Current Every Day Smoker     Packs/day: 1.00     Years: 25.00     Pack years: 25.00     Types: Cigarettes     Smokeless tobacco: Never Used   Substance Use Topics     Alcohol use: Yes     Comment: occasionally         Alcohol Use 9/24/2019   Prescreen: >3 drinks/day or >7 drinks/week? No   Prescreen: >3 drinks/day or >7 drinks/week? -   AUDIT SCORE  -     AUDIT - Alcohol Use Disorders Identification Test - Reproduced from the World Health Organization Audit 2001 (Second Edition) 9/19/2018   1.  How often do you have a drink containing alcohol? 2 to 3 times a week   2.  How many drinks containing alcohol do you have on a typical day when you are drinking? 3 or 4   3.  How often do you have five or more drinks on one occasion? Monthly   4.  How often during the last year have you found that you were not able to stop drinking once you had started? Never   5.  How often during the last year have you failed to do what was normally expected of you because of  drinking? Never   6.  How often during the last year have you needed a first drink in the morning to get yourself going after a heavy drinking session? Never   7.  How often during the last year have you had a feeling of guilt or remorse after drinking? Never   8.  How often during the last year have you been unable to remember what happened the night before because of your drinking? Never   9.  Have you or someone else been injured because of your drinking? No   10. Has a relative, friend, doctor or other health care worker been concerned about your drinking or suggested you cut down? No   TOTAL SCORE 6       Reviewed orders with patient.  Reviewed health maintenance and updated orders accordingly - Yes  Labs reviewed in EPIC  BP Readings from Last 3 Encounters:   09/24/19 110/68   07/10/19 112/62   11/06/18 129/82    Wt Readings from Last 3 Encounters:   09/24/19 49.9 kg (110 lb)   07/10/19 50.8 kg (112 lb)   09/19/18 53.3 kg (117 lb 8 oz)                    Mammogram Screening: Patient over age 50, mutual decision to screen reflected in health maintenance.    Pertinent mammograms are reviewed under the imaging tab.  History of abnormal Pap smear:   NO - age 30-65 PAP every 5 years with negative HPV co-testing recommended  Last 3 Pap and HPV Results:   PAP / HPV Latest Ref Rng & Units 6/13/2016 4/16/2012 11/25/2008   PAP - NIL NIL NIL   HPV 16 DNA NEG Negative - -   HPV 18 DNA NEG Negative - -   OTHER HR HPV NEG Negative - -     PAP / HPV Latest Ref Rng & Units 6/13/2016 4/16/2012 11/25/2008   PAP - NIL NIL NIL   HPV 16 DNA NEG Negative - -   HPV 18 DNA NEG Negative - -   OTHER HR HPV NEG Negative - -     Reviewed and updated as needed this visit by clinical staff  Tobacco  Allergies  Meds  Med Hx  Surg Hx  Fam Hx  Soc Hx        Reviewed and updated as needed this visit by Provider            Review of Systems   Constitutional: Negative for chills and fever.   HENT: Positive for congestion. Negative for ear  "pain, hearing loss and sore throat.    Eyes: Negative for pain and visual disturbance.   Respiratory: Positive for cough. Negative for shortness of breath.    Cardiovascular: Negative for chest pain, palpitations and peripheral edema.   Gastrointestinal: Negative for abdominal pain, constipation, diarrhea, heartburn, hematochezia and nausea.   Breasts:  Negative for tenderness, breast mass and discharge.   Genitourinary: Negative for dysuria, frequency, genital sores, hematuria, pelvic pain, urgency, vaginal bleeding and vaginal discharge.   Musculoskeletal: Negative for arthralgias, joint swelling and myalgias.   Skin: Negative for rash.   Neurological: Negative for dizziness, weakness, headaches and paresthesias.   Psychiatric/Behavioral: Negative for mood changes. The patient is not nervous/anxious.      Battling a mild cold, cough is smoker and it is unchanged     OBJECTIVE:   /68   Pulse 92   Temp 99  F (37.2  C)   Ht 1.625 m (5' 3.98\")   Wt 49.9 kg (110 lb)   LMP 02/01/2006 (Approximate)   SpO2 98%   BMI 18.90 kg/m    Physical Exam  GENERAL APPEARANCE: healthy, alert and no distress  EYES: Eyes grossly normal to inspection, PERRL and conjunctivae and sclerae normal  HENT: ear canals and TM's normal, nose and mouth without ulcers or lesions, oropharynx clear and oral mucous membranes moist  NECK: no adenopathy, no asymmetry, masses, or scars and thyroid normal to palpation  RESP: lungs clear to auscultation - no rales, rhonchi or wheezes  BREAST: normal without masses, tenderness or nipple discharge and no palpable axillary masses or adenopathy  CV: regular rate and rhythm, normal S1 S2, no S3 or S4, no murmur, click or rub, no peripheral edema and peripheral pulses strong  ABDOMEN: soft, nontender, no hepatosplenomegaly, no masses and bowel sounds normal  MS: no musculoskeletal defects are noted and gait is age appropriate without ataxia  SKIN: no suspicious lesions or rashes  NEURO: Normal " "strength and tone, sensory exam grossly normal, mentation intact and speech normal  PSYCH: mentation appears normal and affect normal/bright    Diagnostic Test Results:  Labs reviewed in Epic  none     ASSESSMENT/PLAN:   1. Routine general medical examination at a health care facility  she is not due for colonoscopy this fall, she had a last fall and was told that she may wait 3 to 5 years for her next colonoscopy.  I am unable to add it to the health maintenance at this time likely related to the upgrade    Healthy though smoking 59-year-old female, she has declined CT screening for lung cancer she may call anytime she like and I can order this for her the event she changes her mind    2. Screening for malignant neoplasm of cervix  We discussed doing Pap today versus waiting 2 more years she has never had an abnormal Pap smear and had a negative Pap and HPV test in 2016, patient has opted to wait for 2 more years to do the Pap    3. Personal history of tobacco use  Not interested in quitting smoking  - TOBACCO CESSATION ORDER FOR   - [facility] Fee: Shared Decision Making Visit for Lung Cancer Screening    4. Encounter for screening mammogram for breast cancer  Will call to schedule  - *MA Screening Digital Bilateral; Future    5. Back pain, unspecified back location, unspecified back pain laterality, unspecified chronicity  Patient requests refill she uses approximately 30 per 6 to 12 months  - acetaminophen-codeine (TYLENOL W/CODEINE NO. 3) 300-30 MG per tablet; Take 1-2 tablets by mouth every 4 hours as needed  Dispense: 28 tablet; Refill: 0    COUNSELING:  Reviewed preventive health counseling, as reflected in patient instructions    Estimated body mass index is 18.9 kg/m  as calculated from the following:    Height as of this encounter: 1.625 m (5' 3.98\").    Weight as of this encounter: 49.9 kg (110 lb).         reports that she has been smoking cigarettes. She has a 25.00 pack-year smoking history. She has never " used smokeless tobacco.  Tobacco Cessation Action Plan: Information offered: Patient not interested at this time    Counseling Resources:  ATP IV Guidelines  Pooled Cohorts Equation Calculator  Breast Cancer Risk Calculator  FRAX Risk Assessment  ICSI Preventive Guidelines  Dietary Guidelines for Americans, 2010  [employer]'s MyPlate  ASA Prophylaxis  Lung CA Screening    Robyn Chung PA-C  [facility]  Lung Cancer Screening Shared Decision Making Visit     Kandice Morton is eligible for lung cancer screening on the basis of the information provided in my signed lung cancer screening order.     I have discussed with patient the risks and benefits of screening for lung cancer with low-dose CT.   patient declines CT screening today    The risks include:  radiation exposure: one low dose chest CT has as much ionizing radiation as about 15 chest x-rays or 6 months of background radiation living in [place]    false positives: 96% of positive findings/nodules are NOT cancer, but some might still require additional diagnostic evaluation, including biopsy  over-diagnosis: some slow growing cancers that might never have been clinically significant will be detected and treated unnecessarily     The benefit of early detection of lung cancer is contingent upon adherence to annual screening or more frequent follow up if indicated.     Furthermore, reaping the benefits of screening requires Kandice Morton to be willing and physically able to undergo diagnostic procedures, if indicated. Although no specific guide is available for determining severity of comorbidities, it is reasonable to withhold screening in patients who have greater mortality risk from other diseases.     We did discuss that the only way to prevent lung cancer is to not smoke. Smoking cessation assistance was offered.    I did not offer risk estimation using a calculator such as this one:    ShouldIScreen

## 2019-09-24 ENCOUNTER — OFFICE VISIT (OUTPATIENT)
Dept: FAMILY MEDICINE | Facility: OTHER | Age: 59
End: 2019-09-24
Payer: COMMERCIAL

## 2019-09-24 VITALS
WEIGHT: 110 LBS | OXYGEN SATURATION: 98 % | TEMPERATURE: 99 F | DIASTOLIC BLOOD PRESSURE: 68 MMHG | BODY MASS INDEX: 18.78 KG/M2 | HEART RATE: 92 BPM | SYSTOLIC BLOOD PRESSURE: 110 MMHG | HEIGHT: 64 IN

## 2019-09-24 DIAGNOSIS — Z12.4 SCREENING FOR MALIGNANT NEOPLASM OF CERVIX: ICD-10-CM

## 2019-09-24 DIAGNOSIS — M54.9 BACK PAIN, UNSPECIFIED BACK LOCATION, UNSPECIFIED BACK PAIN LATERALITY, UNSPECIFIED CHRONICITY: ICD-10-CM

## 2019-09-24 DIAGNOSIS — Z87.891 PERSONAL HISTORY OF TOBACCO USE: ICD-10-CM

## 2019-09-24 DIAGNOSIS — Z00.00 ROUTINE GENERAL MEDICAL EXAMINATION AT A HEALTH CARE FACILITY: Primary | ICD-10-CM

## 2019-09-24 DIAGNOSIS — Z12.31 ENCOUNTER FOR SCREENING MAMMOGRAM FOR BREAST CANCER: ICD-10-CM

## 2019-09-24 PROCEDURE — 99396 PREV VISIT EST AGE 40-64: CPT | Performed by: PHYSICIAN ASSISTANT

## 2019-09-24 RX ORDER — ACETAMINOPHEN AND CODEINE PHOSPHATE 300; 30 MG/1; MG/1
1-2 TABLET ORAL EVERY 4 HOURS PRN
Qty: 28 TABLET | Refills: 0 | Status: SHIPPED | OUTPATIENT
Start: 2019-09-24 | End: 2020-10-08

## 2019-09-24 ASSESSMENT — ENCOUNTER SYMPTOMS
FREQUENCY: 0
MYALGIAS: 0
NAUSEA: 0
HEMATOCHEZIA: 0
PARESTHESIAS: 0
FEVER: 0
CHILLS: 0
NERVOUS/ANXIOUS: 0
BREAST MASS: 0
ABDOMINAL PAIN: 0
HEADACHES: 0
ARTHRALGIAS: 0
SHORTNESS OF BREATH: 0
EYE PAIN: 0
COUGH: 1
DIARRHEA: 0
PALPITATIONS: 0
HEMATURIA: 0
SORE THROAT: 0
HEARTBURN: 0
CONSTIPATION: 0
DYSURIA: 0
DIZZINESS: 0
JOINT SWELLING: 0
WEAKNESS: 0

## 2019-09-24 ASSESSMENT — MIFFLIN-ST. JEOR: SCORE: 1058.58

## 2019-09-24 ASSESSMENT — PAIN SCALES - GENERAL: PAINLEVEL: NO PAIN (0)

## 2019-10-02 ENCOUNTER — ANCILLARY PROCEDURE (OUTPATIENT)
Dept: MAMMOGRAPHY | Facility: CLINIC | Age: 59
End: 2019-10-02
Attending: PHYSICIAN ASSISTANT
Payer: COMMERCIAL

## 2019-10-02 DIAGNOSIS — Z12.31 ENCOUNTER FOR SCREENING MAMMOGRAM FOR BREAST CANCER: ICD-10-CM

## 2019-10-02 PROCEDURE — 77067 SCR MAMMO BI INCL CAD: CPT

## 2019-11-26 ENCOUNTER — ALLIED HEALTH/NURSE VISIT (OUTPATIENT)
Dept: FAMILY MEDICINE | Facility: OTHER | Age: 59
End: 2019-11-26

## 2019-11-26 DIAGNOSIS — Z53.9 ERRONEOUS ENCOUNTER--DISREGARD: Primary | ICD-10-CM

## 2019-11-26 NOTE — PROGRESS NOTES
Pt was here for 2nd shingrix vaccine. Pt had her 1st vaccine done via the pharmacy and was advised pt should continue her second vaccine there due of insurance reason. Pt agree and left to the pharmacy.     Sean Gee MA

## 2020-08-05 ENCOUNTER — VIRTUAL VISIT (OUTPATIENT)
Dept: FAMILY MEDICINE | Facility: OTHER | Age: 60
End: 2020-08-05
Payer: COMMERCIAL

## 2020-08-05 DIAGNOSIS — Z20.822 SUSPECTED COVID-19 VIRUS INFECTION: Primary | ICD-10-CM

## 2020-08-05 PROCEDURE — 99213 OFFICE O/P EST LOW 20 MIN: CPT | Mod: 95 | Performed by: NURSE PRACTITIONER

## 2020-08-05 NOTE — PROGRESS NOTES
"Kandice Morton is a 60 year old female who is being evaluated via a billable telephone visit.      The patient has been notified of following:     \"This telephone visit will be conducted via a call between you and your physician/provider. We have found that certain health care needs can be provided without the need for a physical exam.  This service lets us provide the care you need with a short phone conversation.  If a prescription is necessary we can send it directly to your pharmacy.  If lab work is needed we can place an order for that and you can then stop by our lab to have the test done at a later time.    Telephone visits are billed at different rates depending on your insurance coverage. During this emergency period, for some insurers they may be billed the same as an in-person visit.  Please reach out to your insurance provider with any questions.    If during the course of the call the physician/provider feels a telephone visit is not appropriate, you will not be charged for this service.\"    Patient has given verbal consent for Telephone visit?  Yes    What phone number would you like to be contacted at? [phone]    How would you like to obtain your AVS? Mail a copy    Subjective     Kandice Morton is a 60 year old female who presents via phone visit today for the following health issues:    HPI    Acute Illness   Acute illness concerns: sore throat, heavy chest   Onset: 4-5 days     Fever: no     Chills/Sweats: no     Headache (location?): YES- but not different than usual    Sinus Pressure:no    Conjunctivitis:  no    Ear Pain: no    Rhinorrhea: no     Congestion: no     Sore Throat: YES     Cough: YES-productive of clear sputum    Wheeze: no     Decreased Appetite: no    Nausea: no    Vomiting: no    Diarrhea:  no    Dysuria/Freq.: no    Fatigue/Achiness: YES- tired     Sick/Strep Exposure: no     Therapies Tried and outcome: ibuprofen     Pt. Is a smoker.    States all symptoms are mild. "          Patient Active Problem List   Diagnosis     Tobacco use disorder     CARDIOVASCULAR SCREENING; LDL GOAL LESS THAN 130     Neck pain     History of colonic polyps     Past Surgical History:   Procedure Laterality Date     C OPEN SKULL EVAC HEMATOMA, SUPRATENTORIAL, SUB/ EXTRADURAL  1985     COLONOSCOPY N/A 11/6/2018    Procedure: COMBINED COLONOSCOPY with polypectomy by [facility];  Surgeon: Benja Ventura MD;  Location:  GI       Social History     Tobacco Use     Smoking status: Current Every Day Smoker     Packs/day: 1.00     Years: 25.00     Pack years: 25.00     Types: Cigarettes     Smokeless tobacco: Never Used   Substance Use Topics     Alcohol use: Yes     Comment: occasionally     Family History   Problem Relation Age of Onset     Diabetes Mother         Type II     Neurologic Disorder Mother         Migraines     Genitourinary Problems Mother         Colitis- ulcerative     Hypertension Father      Diabetes Father         Type II     Cancer Father         lung           Reviewed and updated as needed this visit by Provider         Review of Systems   Constitutional, HEENT, cardiovascular, pulmonary, gi and gu systems are negative, except as otherwise noted.       Objective   Reported vitals:  LMP 02/01/2006 (Approximate)    healthy, alert and no distress  PSYCH: Alert and oriented times 3; coherent speech, normal   rate and volume, able to articulate logical thoughts, able   to abstract reason, no tangential thoughts, no hallucinations   or delusions  Her affect is normal and pleasant  RESP: cough X1, slight noted during visit. , no audible wheezing, able to talk in full sentences  Remainder of exam unable to be completed due to telephone visits    Diagnostic Test Results:  Labs reviewed in Epic        Assessment/Plan:    1. Suspected COVID-19 virus infection    - Symptomatic COVID-19 Virus (Coronavirus) by PCR; Future  Discussed illness, tobacco risk/cessation. Quarantine recommended until  symptoms calm AND negative Covid test completed.   Pt. Does not need work note.   Has occupation risk.     Return in about 4 weeks (around 9/2/2020) for Physical Exam.      Phone call duration:  8 minutes    IDRIS Arellano CNP

## 2020-10-06 NOTE — PROGRESS NOTES
SUBJECTIVE:   CC: Kandice Morton is an 60 year old woman who presents for preventive health visit.       Patient has been advised of split billing requirements and indicates understanding: Yes  Healthy Habits:     Getting at least 3 servings of Calcium per day:  NO    Bi-annual eye exam:  Yes    Dental care twice a year:  Yes    Sleep apnea or symptoms of sleep apnea:  None    Diet:  Regular (no restrictions)    Duration of exercise:  N/A    Taking medications regularly:  Not Applicable    Medication side effects:  Not applicable    PHQ-2 Total Score: 0    Additional concerns today:  No    Pt complains of right foot pain possible plantar fasciitis --patient stepped on a rock really hard with her right heel in the end of July.  It initially was very painful.  Friends of hers thought that she had plantar fasciitis.  She is been massaging and stretching and it is much improved.          Today's PHQ-2 Score:   PHQ-2 ( 1999 Pfizer) 8/5/2020   Q1: Little interest or pleasure in doing things 0   Q2: Feeling down, depressed or hopeless 0   PHQ-2 Score 0   Q1: Little interest or pleasure in doing things -   Q2: Feeling down, depressed or hopeless -   PHQ-2 Score -        Abuse: Current or Past (Physical, Sexual or Emotional) - No  Do you feel safe in your environment? Yes    Have you ever done Advance Care Planning? (For example, a Health Directive, POLST, or a discussion with a medical provider or your loved ones about your wishes): No, advance care planning information given to patient to review.  Patient plans to discuss their wishes with loved ones or provider.      Social History     Tobacco Use     Smoking status: Current Every Day Smoker     Packs/day: 1.00     Years: 25.00     Pack years: 25.00     Types: Cigarettes     Smokeless tobacco: Never Used   Substance Use Topics     Alcohol use: Yes     Comment: occasionally     If you drink alcohol do you typically have >3 drinks per day or >7 drinks per week?  No    Alcohol Use 9/24/2019   Prescreen: >3 drinks/day or >7 drinks/week? No   Prescreen: >3 drinks/day or >7 drinks/week? -   AUDIT SCORE  -       Reviewed orders with patient.  Reviewed health maintenance and updated orders accordingly - Yes  Labs reviewed in EPIC  BP Readings from Last 3 Encounters:   10/07/20 122/60   09/24/19 110/68   07/10/19 112/62    Wt Readings from Last 3 Encounters:   10/07/20 51.8 kg (114 lb 3.2 oz)   09/24/19 49.9 kg (110 lb)   07/10/19 50.8 kg (112 lb)                  Patient Active Problem List   Diagnosis     Tobacco use disorder     CARDIOVASCULAR SCREENING; LDL GOAL LESS THAN 130     Neck pain     History of colonic polyps     Past Surgical History:   Procedure Laterality Date     C OPEN SKULL EVAC HEMATOMA, SUPRATENTORIAL, SUB/ EXTRADURAL  1985     COLONOSCOPY N/A 11/6/2018    Procedure: COMBINED COLONOSCOPY with polypectomy by [facility];  Surgeon: Benja Ventura MD;  Location:  GI       Social History     Tobacco Use     Smoking status: Current Every Day Smoker     Packs/day: 1.00     Years: 25.00     Pack years: 25.00     Types: Cigarettes     Smokeless tobacco: Never Used   Substance Use Topics     Alcohol use: Yes     Comment: occasionally     Family History   Problem Relation Age of Onset     Diabetes Mother         Type II     Neurologic Disorder Mother         Migraines     Genitourinary Problems Mother         Colitis- ulcerative     Hypertension Father      Diabetes Father         Type II     Cancer Father         lung         Current Outpatient Medications   Medication Sig Dispense Refill     acetaminophen-codeine (TYLENOL W/CODEINE NO. 3) 300-30 MG per tablet Take 1-2 tablets by mouth every 4 hours as needed (Patient not taking: Reported on 10/7/2020) 28 tablet 0     ASPIRIN NOT PRESCRIBED (INTENTIONAL) Antiplatelet medication not prescribed intentionally due to Hx of gastrointestinal or intracranial bleed (Patient not taking: Reported on 10/7/2020) 1 each 0      Allergies   Allergen Reactions     Sulfa Drugs        Mammogram Screening: Patient over age 50, mutual decision to screen reflected in health maintenance.    Pertinent mammograms are reviewed under the imaging tab.  History of abnormal Pap smear:   NO - age 30-65 PAP every 5 years with negative HPV co-testing recommended  Last 3 Pap and HPV Results:   PAP / HPV Latest Ref Rng & Units 6/13/2016 4/16/2012 11/25/2008   PAP - NIL NIL NIL   HPV 16 DNA NEG Negative - -   HPV 18 DNA NEG Negative - -   OTHER HR HPV NEG Negative - -     PAP / HPV Latest Ref Rng & Units 6/13/2016 4/16/2012 11/25/2008   PAP - NIL NIL NIL   HPV 16 DNA NEG Negative - -   HPV 18 DNA NEG Negative - -   OTHER HR HPV NEG Negative - -     Reviewed and updated as needed this visit by clinical staff                 Reviewed and updated as needed this visit by Provider                    Review of Systems   Constitutional: Negative for chills and fever.   HENT: Negative for congestion, ear pain, hearing loss and sore throat.    Eyes: Negative for pain and visual disturbance.   Respiratory: Negative for cough and shortness of breath.    Cardiovascular: Negative for chest pain, palpitations and peripheral edema.   Gastrointestinal: Negative for abdominal pain, constipation, diarrhea, heartburn, hematochezia and nausea.   Breasts:  Negative for tenderness, breast mass and discharge.   Genitourinary: Negative for dysuria, frequency, genital sores, hematuria, pelvic pain, urgency, vaginal bleeding and vaginal discharge.   Musculoskeletal: Negative for arthralgias, joint swelling and myalgias.   Skin: Negative for rash.   Neurological: Negative for dizziness, weakness, headaches and paresthesias.   Psychiatric/Behavioral: Negative for mood changes. The patient is not nervous/anxious.           OBJECTIVE:   LMP 02/01/2006 (Approximate)   Physical Exam  GENERAL APPEARANCE: healthy, alert and no distress  EYES: Eyes grossly normal to inspection, PERRL and  "conjunctivae and sclerae normal  HENT: ear canals and TM's normal, nose and mouth without ulcers or lesions, oropharynx clear and oral mucous membranes moist  NECK: no adenopathy, no asymmetry, masses, or scars and thyroid normal to palpation  RESP: lungs clear to auscultation - no rales, rhonchi or wheezes  BREAST: normal without masses, tenderness or nipple discharge and no palpable axillary masses or adenopathy  CV: regular rate and rhythm, normal S1 S2, no S3 or S4, no murmur, click or rub, no peripheral edema and peripheral pulses strong  ABDOMEN: soft, nontender, no hepatosplenomegaly, no masses and bowel sounds normal  MS: no musculoskeletal defects are noted and gait is age appropriate without ataxia  SKIN: no suspicious lesions or rashes  SKIN: erythematous papule on right inner thigh, appears nearly vesicular though not actual blister noted  NEURO: Normal strength and tone, sensory exam grossly normal, mentation intact and speech normal  PSYCH: mentation appears normal and affect normal/bright    Diagnostic Test Results:  Labs reviewed in Epic  none     ASSESSMENT/PLAN:   1. Routine general medical examination at a health care facility  Need for annual exam    2. Screen for colon cancer  Due in [id number], HM updated    3. Encounter for screening mammogram for breast cancer  scheduled  - *MA Screening Digital Bilateral; Future    4. Screening for deficiency anemia    - **CBC with platelets FUTURE anytime; Future    5. Screening for diabetes mellitus    - **Basic metabolic panel FUTURE anytime; Future    6. CARDIOVASCULAR SCREENING; LDL GOAL LESS THAN 130    - Lipid panel reflex to direct LDL Fasting; Future    Patient has been advised of split billing requirements and indicates understanding: Yes  COUNSELING:  Reviewed preventive health counseling, as reflected in patient instructions    Estimated body mass index is 18.9 kg/m  as calculated from the following:    Height as of 9/24/19: 1.625 m (5' 3.98\").    " Weight as of 9/24/19: 49.9 kg (110 lb).        She reports that she has been smoking cigarettes. She has a 25.00 pack-year smoking history. She has never used smokeless tobacco.  Tobacco Cessation Action Plan:   Information offered: Patient not interested at this time      Counseling Resources:  ATP IV Guidelines  Pooled Cohorts Equation Calculator  Breast Cancer Risk Calculator  BRCA-Related Cancer Risk Assessment: FHS-7 Tool  FRAX Risk Assessment  ICSI Preventive Guidelines  Dietary Guidelines for Americans, 2010  USDA's MyPlate  ASA Prophylaxis  Lung CA Screening    Robyn Chung PA-C  [facility]

## 2020-10-07 ENCOUNTER — OFFICE VISIT (OUTPATIENT)
Dept: FAMILY MEDICINE | Facility: OTHER | Age: 60
End: 2020-10-07
Payer: COMMERCIAL

## 2020-10-07 VITALS
SYSTOLIC BLOOD PRESSURE: 122 MMHG | OXYGEN SATURATION: 97 % | TEMPERATURE: 98.5 F | BODY MASS INDEX: 19.5 KG/M2 | RESPIRATION RATE: 14 BRPM | HEART RATE: 88 BPM | WEIGHT: 114.2 LBS | DIASTOLIC BLOOD PRESSURE: 60 MMHG | HEIGHT: 64 IN

## 2020-10-07 DIAGNOSIS — Z00.00 ROUTINE GENERAL MEDICAL EXAMINATION AT A HEALTH CARE FACILITY: ICD-10-CM

## 2020-10-07 DIAGNOSIS — Z12.11 SCREEN FOR COLON CANCER: ICD-10-CM

## 2020-10-07 DIAGNOSIS — Z13.0 SCREENING FOR DEFICIENCY ANEMIA: ICD-10-CM

## 2020-10-07 DIAGNOSIS — Z12.31 ENCOUNTER FOR SCREENING MAMMOGRAM FOR BREAST CANCER: Primary | ICD-10-CM

## 2020-10-07 DIAGNOSIS — Z13.1 SCREENING FOR DIABETES MELLITUS: ICD-10-CM

## 2020-10-07 DIAGNOSIS — Z13.6 CARDIOVASCULAR SCREENING; LDL GOAL LESS THAN 130: ICD-10-CM

## 2020-10-07 PROCEDURE — 99396 PREV VISIT EST AGE 40-64: CPT | Performed by: PHYSICIAN ASSISTANT

## 2020-10-07 ASSESSMENT — ENCOUNTER SYMPTOMS
WEAKNESS: 0
MYALGIAS: 0
JOINT SWELLING: 0
FREQUENCY: 0
HEADACHES: 0
NAUSEA: 0
HEMATURIA: 0
DIZZINESS: 0
SORE THROAT: 0
ARTHRALGIAS: 0
PALPITATIONS: 0
DYSURIA: 0
HEARTBURN: 0
SHORTNESS OF BREATH: 0
BREAST MASS: 0
CONSTIPATION: 0
NERVOUS/ANXIOUS: 0
CHILLS: 0
EYE PAIN: 0
PARESTHESIAS: 0
COUGH: 0
FEVER: 0
HEMATOCHEZIA: 0
ABDOMINAL PAIN: 0
DIARRHEA: 0

## 2020-10-07 ASSESSMENT — MIFFLIN-ST. JEOR: SCORE: 1075.14

## 2020-10-08 DIAGNOSIS — M54.9 BACK PAIN, UNSPECIFIED BACK LOCATION, UNSPECIFIED BACK PAIN LATERALITY, UNSPECIFIED CHRONICITY: ICD-10-CM

## 2020-10-08 RX ORDER — ACETAMINOPHEN AND CODEINE PHOSPHATE 300; 30 MG/1; MG/1
1-2 TABLET ORAL EVERY 4 HOURS PRN
Qty: 28 TABLET | Refills: 0 | Status: SHIPPED | OUTPATIENT
Start: 2020-10-08 | End: 2021-10-12

## 2020-10-08 NOTE — TELEPHONE ENCOUNTER
Patient called to say that when she had a visit with you she forgot to have her tylenol refilled  Med and pharmacy pended.

## 2020-10-22 ENCOUNTER — ANCILLARY PROCEDURE (OUTPATIENT)
Dept: MAMMOGRAPHY | Facility: CLINIC | Age: 60
End: 2020-10-22
Attending: PHYSICIAN ASSISTANT
Payer: COMMERCIAL

## 2020-10-22 DIAGNOSIS — Z12.31 ENCOUNTER FOR SCREENING MAMMOGRAM FOR BREAST CANCER: ICD-10-CM

## 2020-10-22 DIAGNOSIS — Z13.1 SCREENING FOR DIABETES MELLITUS: ICD-10-CM

## 2020-10-22 DIAGNOSIS — Z13.6 CARDIOVASCULAR SCREENING; LDL GOAL LESS THAN 130: ICD-10-CM

## 2020-10-22 DIAGNOSIS — Z13.0 SCREENING FOR DEFICIENCY ANEMIA: ICD-10-CM

## 2020-10-22 LAB
ANION GAP SERPL CALCULATED.3IONS-SCNC: 5 MMOL/L (ref 3–14)
BUN SERPL-MCNC: 6 MG/DL (ref 7–30)
CALCIUM SERPL-MCNC: 9.5 MG/DL (ref 8.5–10.1)
CHLORIDE SERPL-SCNC: 108 MMOL/L (ref 94–109)
CHOLEST SERPL-MCNC: 214 MG/DL
CO2 SERPL-SCNC: 27 MMOL/L (ref 20–32)
CREAT SERPL-MCNC: 0.62 MG/DL (ref 0.52–1.04)
ERYTHROCYTE [DISTWIDTH] IN BLOOD BY AUTOMATED COUNT: 13.8 % (ref 10–15)
GFR SERPL CREATININE-BSD FRML MDRD: >90 ML/MIN/{1.73_M2}
GLUCOSE SERPL-MCNC: 100 MG/DL (ref 70–99)
HCT VFR BLD AUTO: 41.9 % (ref 35–47)
HDLC SERPL-MCNC: 107 MG/DL
HGB BLD-MCNC: 14.2 G/DL (ref 11.7–15.7)
LDLC SERPL CALC-MCNC: 89 MG/DL
MCH RBC QN AUTO: 32.2 PG (ref 26.5–33)
MCHC RBC AUTO-ENTMCNC: 33.9 G/DL (ref 31.5–36.5)
MCV RBC AUTO: 95 FL (ref 78–100)
NONHDLC SERPL-MCNC: 107 MG/DL
PLATELET # BLD AUTO: 373 10E9/L (ref 150–450)
POTASSIUM SERPL-SCNC: 3.8 MMOL/L (ref 3.4–5.3)
RBC # BLD AUTO: 4.41 10E12/L (ref 3.8–5.2)
SODIUM SERPL-SCNC: 140 MMOL/L (ref 133–144)
TRIGL SERPL-MCNC: 92 MG/DL
WBC # BLD AUTO: 9.1 10E9/L (ref 4–11)

## 2020-10-22 PROCEDURE — 85027 COMPLETE CBC AUTOMATED: CPT | Performed by: PHYSICIAN ASSISTANT

## 2020-10-22 PROCEDURE — 77067 SCR MAMMO BI INCL CAD: CPT | Performed by: RADIOLOGY

## 2020-10-22 PROCEDURE — 80048 BASIC METABOLIC PNL TOTAL CA: CPT | Performed by: PHYSICIAN ASSISTANT

## 2020-10-22 PROCEDURE — 80061 LIPID PANEL: CPT | Performed by: PHYSICIAN ASSISTANT

## 2021-10-11 NOTE — PROGRESS NOTES
SUBJECTIVE:   CC: Kandice Morton is an 61 year old woman who presents for preventive health visit.       Patient has been advised of split billing requirements and indicates understanding: Yes  Healthy Habits:     Getting at least 3 servings of Calcium per day:  NO    Bi-annual eye exam:  Yes    Dental care twice a year:  Yes    Sleep apnea or symptoms of sleep apnea:  None    Diet:  Regular (no restrictions)    Frequency of exercise:  1 day/week    Duration of exercise:  Less than 15 minutes    Taking medications regularly:  Not Applicable    Medication side effects:  Not applicable    PHQ-2 Total Score: 0    Additional concerns today:  No      She continues to have multiple joint pains but specifically her back pain.  She gets about 28 Tylenol with codeine per year database was reviewed and she has not refilled it since it was prescribed in October of last year.  She does get some leg cramps.  She did have an intense right anterior thigh cramp she treated it with water and walking it off but her thigh is .    Today's PHQ-2 Score:   PHQ-2 ( 1999 Pfizer) 10/7/2020   Q1: Little interest or pleasure in doing things 0   Q2: Feeling down, depressed or hopeless 0   PHQ-2 Score 0   Q1: Little interest or pleasure in doing things Not at all   Q2: Feeling down, depressed or hopeless Not at all   PHQ-2 Score 0       Abuse: Current or Past (Physical, Sexual or Emotional) - No  Do you feel safe in your environment? Yes        Social History     Tobacco Use     Smoking status: Current Every Day Smoker     Packs/day: 1.00     Years: 25.00     Pack years: 25.00     Types: Cigarettes     Smokeless tobacco: Never Used   Substance Use Topics     Alcohol use: Yes     Comment: occasionally     If you drink alcohol do you typically have >3 drinks per day or >7 drinks per week? No    Alcohol Use 10/7/2020   Prescreen: >3 drinks/day or >7 drinks/week? No   Prescreen: >3 drinks/day or >7 drinks/week? -   AUDIT SCORE  -        Reviewed orders with patient.  Reviewed health maintenance and updated orders accordingly - Yes  Labs reviewed in EPIC  BP Readings from Last 3 Encounters:   10/12/21 120/80   10/07/20 122/60   09/24/19 110/68    Wt Readings from Last 3 Encounters:   10/12/21 51.3 kg (113 lb)   10/07/20 51.8 kg (114 lb 3.2 oz)   09/24/19 49.9 kg (110 lb)                  Patient Active Problem List   Diagnosis     Tobacco use disorder     CARDIOVASCULAR SCREENING; LDL GOAL LESS THAN 130     Neck pain     History of colonic polyps     Past Surgical History:   Procedure Laterality Date     C OPEN SKULL EVAC HEMATOMA, SUPRATENTORIAL, SUB/ EXTRADURAL  1985     COLONOSCOPY N/A 11/6/2018    Procedure: COMBINED COLONOSCOPY with polypectomy by [facility];  Surgeon: Benja Ventura MD;  Location:  GI       Social History     Tobacco Use     Smoking status: Current Every Day Smoker     Packs/day: 1.00     Years: 25.00     Pack years: 25.00     Types: Cigarettes     Smokeless tobacco: Never Used   Substance Use Topics     Alcohol use: Yes     Comment: occasionally     Family History   Problem Relation Age of Onset     Diabetes Mother         Type II     Neurologic Disorder Mother         Migraines     Genitourinary Problems Mother         Colitis- ulcerative     Hypertension Father      Diabetes Father         Type II     Cancer Father         lung         Current Outpatient Medications   Medication Sig Dispense Refill     acetaminophen-codeine (TYLENOL W/CODEINE #3) 300-30 MG per tablet Take 1-2 tablets by mouth every 4 hours as needed 28 tablet 0     ASPIRIN NOT PRESCRIBED (INTENTIONAL) Antiplatelet medication not prescribed intentionally due to Hx of gastrointestinal or intracranial bleed (Patient not taking: Reported on 10/7/2020) 1 each 0     Allergies   Allergen Reactions     Sulfa Drugs        Breast Cancer Screening:  Any new diagnosis of family breast, ovarian, or bowel cancer? No    FHS-7: No flowsheet data found.    Mammogram  Screening: Recommended mammography every 1-2 years with patient discussion and risk factor consideration  Pertinent mammograms are reviewed under the imaging tab.    History of abnormal Pap smear:   NO - age 30-65 PAP every 5 years with negative HPV co-testing recommended  Last 3 Pap and HPV Results:   PAP / HPV Latest Ref Rng & Units 6/13/2016 4/16/2012 11/25/2008   PAP (Historical) - NIL NIL NIL   HPV16 NEG Negative - -   HPV18 NEG Negative - -   HRHPV NEG Negative - -     PAP / HPV Latest Ref Rng & Units 6/13/2016 4/16/2012 11/25/2008   PAP (Historical) - NIL NIL NIL   HPV16 NEG Negative - -   HPV18 NEG Negative - -   HRHPV NEG Negative - -     Reviewed and updated as needed this visit by clinical staff                 Reviewed and updated as needed this visit by Provider                    Review of Systems   Constitutional: Negative for chills and fever.   HENT: Negative for congestion, ear pain, hearing loss and sore throat.    Eyes: Negative for pain and visual disturbance.   Respiratory: Negative for cough and shortness of breath.    Cardiovascular: Negative for chest pain, palpitations and peripheral edema.   Gastrointestinal: Negative for abdominal pain, constipation, diarrhea, heartburn, hematochezia and nausea.   Breasts:  Negative for tenderness, breast mass and discharge.   Genitourinary: Negative for dysuria, frequency, genital sores, hematuria, pelvic pain, urgency, vaginal bleeding and vaginal discharge.   Musculoskeletal: Negative for arthralgias, joint swelling and myalgias.   Skin: Negative for rash.   Neurological: Negative for dizziness, weakness, headaches and paresthesias.   Psychiatric/Behavioral: Negative for mood changes. The patient is not nervous/anxious.           OBJECTIVE:   LMP 02/01/2006 (Approximate)   Physical Exam  GENERAL APPEARANCE: healthy, alert and no distress  EYES: Eyes grossly normal to inspection, PERRL and conjunctivae and sclerae normal  HENT: ear canals and TM's  normal, nose and mouth without ulcers or lesions, oropharynx clear and oral mucous membranes moist  NECK: no adenopathy, no asymmetry, masses, or scars and thyroid normal to palpation  RESP: lungs clear to auscultation - no rales, rhonchi or wheezes  BREAST: normal without masses, tenderness or nipple discharge and no palpable axillary masses or adenopathy  CV: regular rate and rhythm, normal S1 S2, no S3 or S4, no murmur, click or rub, no peripheral edema and peripheral pulses strong  ABDOMEN: soft, nontender, no hepatosplenomegaly, no masses and bowel sounds normal   (female): normal female external genitalia, normal urethral meatus, vaginal mucosal atrophy noted, normal cervix, adnexae, and uterus without masses or abnormal discharge  MS: no musculoskeletal defects are noted and gait is age appropriate without ataxia  SKIN: no suspicious lesions or rashes  NEURO: Normal strength and tone, sensory exam grossly normal, mentation intact and speech normal  PSYCH: mentation appears normal and affect normal/bright    Diagnostic Test Results:  Labs reviewed in Epic  No results found for this or any previous visit (from the past 24 hour(s)).  She is not fasting  ASSESSMENT/PLAN:   (M62.838) Muscle spasm  (primary encounter diagnosis)  Comment: Stretch, increase fluids and follow-up with any distal swelling or worsening she will also do some gentle massage  Plan: Comprehensive metabolic panel (BMP + Alb, Alk         Phos, ALT, AST, Total. Bili, TP)            (M54.9) Back pain, unspecified back location, unspecified back pain laterality, unspecified chronicity  Comment: Her her annual prescription  Plan: acetaminophen-codeine (TYLENOL W/CODEINE #3)         300-30 MG per tablet            (Z13.1) Screening for diabetes mellitus  Comment: Patient is not fasting  Plan: Comprehensive metabolic panel (BMP + Alb, Alk         Phos, ALT, AST, Total. Bili, TP)            (Z12.31) Encounter for screening mammogram for breast  "cancer  Comment:   Plan: *MA Screening Digital Bilateral        Patient will call to schedule    (Z12.4) Screening for cervical cancer  Comment: Pending  Plan: Pap screen with HPV - recommended age 30 - 65         years              Patient has been advised of split billing requirements and indicates understanding: Yes  COUNSELING:  Reviewed preventive health counseling, as reflected in patient instructions    Estimated body mass index is 19.52 kg/m  as calculated from the following:    Height as of 10/7/20: 1.629 m (5' 4.13\").    Weight as of 10/7/20: 51.8 kg (114 lb 3.2 oz).        She reports that she has been smoking cigarettes. She has a 25.00 pack-year smoking history. She has never used smokeless tobacco.  Tobacco Cessation Action Plan:   Information offered: Patient not interested at this time      Counseling Resources:  ATP IV Guidelines  Pooled Cohorts Equation Calculator  Breast Cancer Risk Calculator  BRCA-Related Cancer Risk Assessment: FHS-7 Tool  FRAX Risk Assessment  ICSI Preventive Guidelines  Dietary Guidelines for Americans, 2010  USDA's MyPlate  ASA Prophylaxis  Lung CA Screening    Robyn Chung PA-C  [facility]  "

## 2021-10-12 ENCOUNTER — OFFICE VISIT (OUTPATIENT)
Dept: FAMILY MEDICINE | Facility: OTHER | Age: 61
End: 2021-10-12
Payer: COMMERCIAL

## 2021-10-12 VITALS
OXYGEN SATURATION: 100 % | WEIGHT: 113 LBS | BODY MASS INDEX: 18.83 KG/M2 | HEIGHT: 65 IN | RESPIRATION RATE: 16 BRPM | TEMPERATURE: 98 F | DIASTOLIC BLOOD PRESSURE: 80 MMHG | SYSTOLIC BLOOD PRESSURE: 120 MMHG | HEART RATE: 85 BPM

## 2021-10-12 DIAGNOSIS — M54.9 BACK PAIN, UNSPECIFIED BACK LOCATION, UNSPECIFIED BACK PAIN LATERALITY, UNSPECIFIED CHRONICITY: ICD-10-CM

## 2021-10-12 DIAGNOSIS — Z12.4 SCREENING FOR CERVICAL CANCER: ICD-10-CM

## 2021-10-12 DIAGNOSIS — Z13.1 SCREENING FOR DIABETES MELLITUS: ICD-10-CM

## 2021-10-12 DIAGNOSIS — Z12.31 ENCOUNTER FOR SCREENING MAMMOGRAM FOR BREAST CANCER: ICD-10-CM

## 2021-10-12 DIAGNOSIS — M62.838 MUSCLE SPASM: Primary | ICD-10-CM

## 2021-10-12 LAB
ALBUMIN SERPL-MCNC: 4.1 G/DL (ref 3.4–5)
ALP SERPL-CCNC: 76 U/L (ref 40–150)
ALT SERPL W P-5'-P-CCNC: 17 U/L (ref 0–50)
ANION GAP SERPL CALCULATED.3IONS-SCNC: 3 MMOL/L (ref 3–14)
AST SERPL W P-5'-P-CCNC: 17 U/L (ref 0–45)
BILIRUB SERPL-MCNC: 1.2 MG/DL (ref 0.2–1.3)
BUN SERPL-MCNC: 9 MG/DL (ref 7–30)
CALCIUM SERPL-MCNC: 9.4 MG/DL (ref 8.5–10.1)
CHLORIDE BLD-SCNC: 107 MMOL/L (ref 94–109)
CO2 SERPL-SCNC: 28 MMOL/L (ref 20–32)
CREAT SERPL-MCNC: 0.66 MG/DL (ref 0.52–1.04)
GFR SERPL CREATININE-BSD FRML MDRD: >90 ML/MIN/1.73M2
GLUCOSE BLD-MCNC: 76 MG/DL (ref 70–99)
POTASSIUM BLD-SCNC: 3.5 MMOL/L (ref 3.4–5.3)
PROT SERPL-MCNC: 7.5 G/DL (ref 6.8–8.8)
SODIUM SERPL-SCNC: 138 MMOL/L (ref 133–144)

## 2021-10-12 PROCEDURE — G0145 SCR C/V CYTO,THINLAYER,RESCR: HCPCS | Performed by: PHYSICIAN ASSISTANT

## 2021-10-12 PROCEDURE — 80053 COMPREHEN METABOLIC PANEL: CPT | Performed by: PHYSICIAN ASSISTANT

## 2021-10-12 PROCEDURE — 99396 PREV VISIT EST AGE 40-64: CPT | Performed by: PHYSICIAN ASSISTANT

## 2021-10-12 PROCEDURE — 87624 HPV HI-RISK TYP POOLED RSLT: CPT | Performed by: PHYSICIAN ASSISTANT

## 2021-10-12 PROCEDURE — 36415 COLL VENOUS BLD VENIPUNCTURE: CPT | Performed by: PHYSICIAN ASSISTANT

## 2021-10-12 RX ORDER — ACETAMINOPHEN AND CODEINE PHOSPHATE 300; 30 MG/1; MG/1
1-2 TABLET ORAL EVERY 4 HOURS PRN
Qty: 28 TABLET | Refills: 0 | Status: SHIPPED | OUTPATIENT
Start: 2021-10-12 | End: 2022-03-23

## 2021-10-12 ASSESSMENT — ENCOUNTER SYMPTOMS
SORE THROAT: 0
FEVER: 0
NAUSEA: 0
JOINT SWELLING: 0
WEAKNESS: 0
MYALGIAS: 0
BREAST MASS: 0
DIZZINESS: 0
COUGH: 0
ARTHRALGIAS: 0
HEARTBURN: 0
ABDOMINAL PAIN: 0
HEADACHES: 0
HEMATOCHEZIA: 0
EYE PAIN: 0
CHILLS: 0
PALPITATIONS: 0
DIARRHEA: 0
SHORTNESS OF BREATH: 0
FREQUENCY: 0
DYSURIA: 0
NERVOUS/ANXIOUS: 0
HEMATURIA: 0
CONSTIPATION: 0
PARESTHESIAS: 0

## 2021-10-12 ASSESSMENT — MIFFLIN-ST. JEOR: SCORE: 1080.93

## 2021-10-14 LAB
BKR LAB AP GYN ADEQUACY: NORMAL
BKR LAB AP GYN INTERPRETATION: NORMAL
BKR LAB AP HPV REFLEX: NORMAL
BKR LAB AP PREVIOUS ABNORMAL: NORMAL
PATH REPORT.COMMENTS IMP SPEC: NORMAL
PATH REPORT.RELEVANT HX SPEC: NORMAL

## 2021-10-15 LAB
HUMAN PAPILLOMA VIRUS 16 DNA: NEGATIVE
HUMAN PAPILLOMA VIRUS 18 DNA: NEGATIVE
HUMAN PAPILLOMA VIRUS FINAL DIAGNOSIS: NORMAL
HUMAN PAPILLOMA VIRUS OTHER HR: NEGATIVE

## 2021-11-10 ENCOUNTER — HOSPITAL ENCOUNTER (EMERGENCY)
Facility: CLINIC | Age: 61
Discharge: HOME | End: 2021-11-10
Attending: EMERGENCY MEDICINE | Admitting: EMERGENCY MEDICINE
Payer: COMMERCIAL

## 2021-11-10 ENCOUNTER — PATIENT OUTREACH (OUTPATIENT)
Dept: FAMILY MEDICINE | Facility: OTHER | Age: 61
End: 2021-11-10

## 2021-11-10 VITALS
TEMPERATURE: 98.2 F | HEIGHT: 65 IN | WEIGHT: 110 LBS | RESPIRATION RATE: 18 BRPM | BODY MASS INDEX: 18.33 KG/M2 | HEART RATE: 78 BPM | OXYGEN SATURATION: 95 % | DIASTOLIC BLOOD PRESSURE: 65 MMHG | SYSTOLIC BLOOD PRESSURE: 119 MMHG

## 2021-11-10 DIAGNOSIS — U07.1 INFECTION DUE TO 2019 NOVEL CORONAVIRUS: ICD-10-CM

## 2021-11-10 DIAGNOSIS — R19.7 VOMITING AND DIARRHEA: ICD-10-CM

## 2021-11-10 DIAGNOSIS — R11.10 VOMITING AND DIARRHEA: ICD-10-CM

## 2021-11-10 LAB
ANION GAP SERPL CALCULATED.3IONS-SCNC: 8 MMOL/L (ref 3–14)
BASOPHILS # BLD AUTO: 0 10E3/UL (ref 0–0.2)
BASOPHILS NFR BLD AUTO: 0 %
BUN SERPL-MCNC: 10 MG/DL (ref 7–30)
CALCIUM SERPL-MCNC: 9.3 MG/DL (ref 8.5–10.1)
CHLORIDE BLD-SCNC: 102 MMOL/L (ref 94–109)
CO2 SERPL-SCNC: 23 MMOL/L (ref 20–32)
CREAT SERPL-MCNC: 0.62 MG/DL (ref 0.52–1.04)
EOSINOPHIL # BLD AUTO: 0 10E3/UL (ref 0–0.7)
EOSINOPHIL NFR BLD AUTO: 0 %
ERYTHROCYTE [DISTWIDTH] IN BLOOD BY AUTOMATED COUNT: 13 % (ref 10–15)
GFR SERPL CREATININE-BSD FRML MDRD: >90 ML/MIN/1.73M2
GLUCOSE BLD-MCNC: 136 MG/DL (ref 70–99)
HCT VFR BLD AUTO: 43.3 % (ref 35–47)
HGB BLD-MCNC: 15.1 G/DL (ref 11.7–15.7)
HOLD SPECIMEN: NORMAL
IMM GRANULOCYTES # BLD: 0 10E3/UL
IMM GRANULOCYTES NFR BLD: 0 %
LYMPHOCYTES # BLD AUTO: 1.5 10E3/UL (ref 0.8–5.3)
LYMPHOCYTES NFR BLD AUTO: 15 %
MCH RBC QN AUTO: 32.1 PG (ref 26.5–33)
MCHC RBC AUTO-ENTMCNC: 34.9 G/DL (ref 31.5–36.5)
MCV RBC AUTO: 92 FL (ref 78–100)
MONOCYTES # BLD AUTO: 0.9 10E3/UL (ref 0–1.3)
MONOCYTES NFR BLD AUTO: 9 %
NEUTROPHILS # BLD AUTO: 7.6 10E3/UL (ref 1.6–8.3)
NEUTROPHILS NFR BLD AUTO: 76 %
NRBC # BLD AUTO: 0 10E3/UL
NRBC BLD AUTO-RTO: 0 /100
PLATELET # BLD AUTO: 335 10E3/UL (ref 150–450)
POTASSIUM BLD-SCNC: 3.8 MMOL/L (ref 3.4–5.3)
RBC # BLD AUTO: 4.71 10E6/UL (ref 3.8–5.2)
SODIUM SERPL-SCNC: 133 MMOL/L (ref 133–144)
WBC # BLD AUTO: 10.1 10E3/UL (ref 4–11)

## 2021-11-10 PROCEDURE — 80048 BASIC METABOLIC PNL TOTAL CA: CPT | Performed by: EMERGENCY MEDICINE

## 2021-11-10 PROCEDURE — 96374 THER/PROPH/DIAG INJ IV PUSH: CPT

## 2021-11-10 PROCEDURE — 36415 COLL VENOUS BLD VENIPUNCTURE: CPT | Performed by: EMERGENCY MEDICINE

## 2021-11-10 PROCEDURE — 96361 HYDRATE IV INFUSION ADD-ON: CPT

## 2021-11-10 PROCEDURE — 258N000003 HC RX IP 258 OP 636: Performed by: EMERGENCY MEDICINE

## 2021-11-10 PROCEDURE — 250N000011 HC RX IP 250 OP 636: Performed by: EMERGENCY MEDICINE

## 2021-11-10 PROCEDURE — 99284 EMERGENCY DEPT VISIT MOD MDM: CPT | Mod: 25

## 2021-11-10 PROCEDURE — 85025 COMPLETE CBC W/AUTO DIFF WBC: CPT | Performed by: EMERGENCY MEDICINE

## 2021-11-10 RX ORDER — ONDANSETRON 2 MG/ML
4 INJECTION INTRAMUSCULAR; INTRAVENOUS EVERY 30 MIN PRN
Status: DISCONTINUED | OUTPATIENT
Start: 2021-11-10 | End: 2021-11-10 | Stop reason: HOSPADM

## 2021-11-10 RX ORDER — ONDANSETRON 4 MG/1
4 TABLET, ORALLY DISINTEGRATING ORAL EVERY 8 HOURS PRN
Qty: 10 TABLET | Refills: 0 | Status: SHIPPED | OUTPATIENT
Start: 2021-11-10 | End: 2021-11-13

## 2021-11-10 RX ORDER — ONDANSETRON 2 MG/ML
4 INJECTION INTRAMUSCULAR; INTRAVENOUS ONCE
Status: COMPLETED | OUTPATIENT
Start: 2021-11-10 | End: 2021-11-10

## 2021-11-10 RX ADMIN — SODIUM CHLORIDE 1000 ML: 9 INJECTION, SOLUTION INTRAVENOUS at 04:23

## 2021-11-10 RX ADMIN — ONDANSETRON 4 MG: 2 INJECTION INTRAMUSCULAR; INTRAVENOUS at 04:23

## 2021-11-10 RX ADMIN — SODIUM CHLORIDE 1000 ML: 9 INJECTION, SOLUTION INTRAVENOUS at 02:53

## 2021-11-10 RX ADMIN — ONDANSETRON 4 MG: 2 INJECTION INTRAMUSCULAR; INTRAVENOUS at 02:53

## 2021-11-10 ASSESSMENT — ENCOUNTER SYMPTOMS
SHORTNESS OF BREATH: 0
DIARRHEA: 1
HEADACHES: 1
MYALGIAS: 1
FATIGUE: 1
SORE THROAT: 1
NAUSEA: 1
COUGH: 1
ABDOMINAL PAIN: 1
VOMITING: 1

## 2021-11-10 ASSESSMENT — MIFFLIN-ST. JEOR: SCORE: 1064.84

## 2021-11-10 NOTE — ED TRIAGE NOTES
[facility]  ED Arrival Note    Arrives through triage. Pt states that she was dx with Covid 11/3/20, and had been trying to manage symptoms at home (headache, coughing and sore throat). Pt states that she started having multiple episodes of diarrhea tonight and n/v. Pt is worried about dehydration.  Pt tried taking nyquil but vomited.    Visitors during triage: Friend    Triage Interventions: N/A    Ambulatory: Yes    Meets Stroke Criteria?: No    Meets Trauma Criteria?: No    Directed to: Main ED

## 2021-11-10 NOTE — ED PROVIDER NOTES
"  History   Chief Complaint:  Nausea, Vomiting, & Diarrhea and Covid Concern     HPI   Kandice Morton is a 61 year old female with history of recent COVID-19 diagnosis who presents with nausea, vomiting and diarrhea. The patient reports that she ws diagnosed with COVID on 11/3/2021 and has been having headaches, myalgias, fatigue, coughing and a sore throat. She has also been having abdominal pain, nausea, vomiting and diarrhea persistently and is concerned about dehydration. She states that she has been trying to drink and eat small amounts, but last night tried eating a burger and has been vomiting since that time. She tried taking Nyquil, but vomited it up.  Additionally, the patient states that she has been having trouble sleeping. She denies any shortness of breath.     Review of Systems   Constitutional: Positive for fatigue.   HENT: Positive for sore throat.    Respiratory: Positive for cough. Negative for shortness of breath.    Gastrointestinal: Positive for abdominal pain, diarrhea, nausea and vomiting.   Musculoskeletal: Positive for myalgias.   Neurological: Positive for headaches.   All other systems reviewed and are negative.    Allergies:  Sulfa Drugs    Medications:  Tylenol #3    Past Medical History:     Genital herpes  Subdural hemorrhage  Tobacco use disorder   COVID    Past Surgical History:    Hematoma evacuation of skull  Colonoscopy     Family History:    Mother: type II diabetes, migraines, ulcerative colitis  Father: hypertension, type II diabetes, lung cancer     Social History:  The patient presents to the emergency department alone.      Physical Exam     Patient Vitals for the past 24 hrs:   BP Temp Temp src Pulse Resp SpO2 Height Weight   11/10/21 0400 119/65 -- -- 78 -- 95 % -- --   11/10/21 0227 (!) 144/70 98.2  F (36.8  C) Oral 77 18 95 % 1.651 m (5' 5\") 49.9 kg (110 lb)       Physical Exam  General: Patient is awake, alert and interactive when I enter the room  Head: The scalp, " face, and head appear normal  CV: Regular rate and rhythm.   Resp: Lungs are clear without wheezes or rales. No respiratory distress.   GI: Abdomen is soft, no rigidity, guarding, or rebound. No distension. No tenderness to palpation in any quadrant.     MS: Normal tone. Joints grossly normal without effusions. No asymmetric leg swelling, calf or thigh tenderness.    Skin: No rash or lesions noted. Normal capillary refill noted  Neuro: Speech is normal and fluent. Face is symmetric. Moving all extremities.   Psych:  Normal affect.  Appropriate interactions.    Emergency Department Course     Laboratory:  CBC: WBC 10.1, HGB 15.1,    BMP: Glucose 136 (H) o/w WNL (Creatinine 0.62)     Emergency Department Course:  Reviewed:  I reviewed nursing notes, vitals and past medical history    Assessments:  0417 I obtained history and examined the patient as noted above.   0550 I rechecked the patient and explained findings.     Interventions:  0253 NS, 1 L, IV bolus   0253 Zofran 4 mg IV   0423 NS, 1 L, IV bolus   0423 Zofran 4 mg IV     Disposition:  The patient was discharged to home.     Impression & Plan     Medical Decision Making:  Kandice Morton is a 61 year old female whocomes today for evaluation of nausea, vomiting, diarrhea, and cough after testing positive for COVID-19 on 11/03/2021.  Patient's been having significant diarrhea and vomiting today and is concerned about falling behind on her fluids.  Upon initial exam she is hemodynamically stable with normal vital signs.  She is afebrile.  Her abdominal exam is fairly benign without any significant guarding, rebound or tenderness. The patient is not hypoxic.  The patient's work of breathing is normal.  Mentation is normal.  The patient does not require hospitalization.  Patient was rehydrated with 2 L of fluid and required some Zofran for her nausea and vomiting.  She was able to tolerate water in the emergency department and has not vomited since  administration of Zofran.  However she does have some mild ongoing nausea.  However she feels safe going home currently.  Blood work was obtained and electrolytes are within normal limits. At this time, I believe the patient is safe for discharge home.  The patient was given the current [place] Department of Health guidelines on self isolation.  They are asked to follow-up via telemetry medicine.  They are asked to drink plenty of fluids.  They are asked to return if they develop severe difficulty in breathing or worsening requiring hospitalization.     Covid-19  Kandice Morton was evaluated during a global COVID-19 pandemic, which necessitated consideration that the patient might be at risk for infection with the SARS-CoV-2 virus that causes COVID-19.   Applicable protocols for evaluation were followed during the patient's care.   COVID-19 was considered as part of the patient's evaluation. The plan for testing is:  a test was obtained at a previous visit and reviewed & considered today.    Diagnosis:    ICD-10-CM    1. Infection due to 2019 novel coronavirus  U07.1    2. Vomiting and diarrhea  R11.10     R19.7        Discharge Medications:  None     Scribe Disclosure:  Cristino GR, am serving as a scribe at 3:43 AM on 11/10/2021 to document services personally performed by Pablo Perea MD based on my observations and the provider's statements to me.              Pablo Perea MD  11/10/21 0637

## 2021-11-10 NOTE — DISCHARGE INSTRUCTIONS
Discharge Instructions  COVID-19    COVID-19 is the disease caused by a new coronavirus. The virus spreads from person-to-person primarily by droplets when an infected person coughs or sneezes and the droplets are then breathed in by another person. There are tests available to diagnose COVID-19. You may have been diagnosed with COVID, may be being tested for COVID and have a pending test result, or may have been exposed to COVID.    Symptoms of COVID-19  Many people have no symptoms or mild symptoms.  Symptoms may usually appear 4 to 5 days (up to 14 days) after contact with a person with COVID-19. Some people will get severe symptoms and pneumonia. Usual symptoms are:     ? Fever  ? Cough  ? Trouble breathing    Less common symptoms are: Headache, body aches, sore throat, sneezing, diarrhea, loss of taste or smell.    Isolation and Quarantine    You may have been seen because you have symptoms, had an exposure, or had some other concern about possible COVID. The best way to stop the spread of the virus is to avoid contact with others.    Isolation refers to sick people staying away from people who are not sick. A person in quarantine is limiting activity because they were exposed and are waiting to see if they might become sick.    If you test positive for COVID, you should stay home (isolation) for at least 10 days after your symptoms began, and for 24 hours with no fever and improvement of symptoms--whichever is longer. (Your fever should be gone for 24 hours without using fever-reducing medicine). If you have no symptoms, you should stay home (isolation) for 10 days from the day of the test. If you have been vaccinated for COVID, the vaccination will not cause you to test positive so a positive test result generally is a  true positive .    For example, if you have a fever and cough for 6 days, you need to stay home 4 more days with no fever for a total of 10 days. Or, if you have a fever and cough for 10 days,  you need to stay home one more day with no fever for a total of 11 days.    If you have a high-risk exposure to COVID (you spent 15 minutes or more within six feet of somebody who has COVID), you should stay home (quarantine) for 14 days, unless you are vaccinated. Even if you test negative for COVID, the CDC recommends a 14-day quarantine from the time of your last exposure to that individual (unless you are vaccinated). There are options for a shortened (<14 day quarantine) you can review at:  [email or web address]./[email or web address]/coronavirus/close.html#long    If you live in the same house as somebody with COVID and cannot separate from them, you will need to quarantine for 14-days after that person's isolation (infectious) period. That means that you may need to quarantine for 24-days after that person became symptomatic/ill.    If you are vaccinated and do not develop symptoms, you do not need to quarantine after exposure.    If you have symptoms but a negative test, you should stay at home until you are symptom-free and without fever for 24 hours, using the same judgment you would for when it is safe to return to work/school from strep throat, influenza, or the common cold. If you worsen, you should consider being re-evaluated.    If you are being tested for COVID because of symptoms and your test is pending, you should stay home until you know your test result.    If I have COVID, how should I protect myself and others?    Do not go to work or school. Have a friend or relative do your shopping. Do not use public transportation (bus, train) or ridesharing (Lyft, Uber).    Separate yourself from other people in your home. As much as possible, you should stay in one room and away from other people in your home. Also, use a separate bathroom, if possible. Avoid handling pets or other animals while sick.     Wear a facemask if you need to be around other people and cover your mouth and nose with a tissue when  you cough or sneeze.     Avoid sharing personal household items. You should not share dishes, drinking glasses, forks/knives/spoons, towels, or bedding with other people in your home. After using these items, they should be washed with soap and water. Clean parts of your home that are touched often (doorknobs, faucets, countertops, etc.) daily.     Wash your hands often with soap and water for at least 20 seconds or use an alcohol-based hand  containing at least 60% alcohol.     Avoid touching your face.    Treat your symptoms. You can take Acetaminophen (Tylenol) to treat body aches and fever as needed for comfort. Ibuprofen (Advil or Motrin) can be used as well if you still have symptoms after taking Tylenol. Drink fluids. Rest.    Watch for worsening symptoms such as shortness of breath/difficulty breathing or very severe weakness.    Employers/workplaces are being asked by the Centers for Disease Control (CDC) to not request notes/documentation for you to return to work or prove that you were ill. You may choose to show your employer this paperwork. Also, repeat testing should not be required to return to work.    Exercise/Sports in rare cases, COVID could affect your heart in a way that makes exercise or participation in sports dangerous.    If you have a mild COVID illness (fever, cough, sore throat, and similar symptoms but no difficulty breathing or abnormalities of the lung): After your COVID symptoms have resolved, wait 14-days before returning to activity.  If you have more than a mild illness (meaning that you have problems with your breathing or lungs) or if you participate in competitive or strenuous activity or have a history of heart disease: Please see your primary doctor/provider prior to return to activity/competition.    Antibody treatments are available for patients with mild to moderate COVID illness in order to prevent severe illness. In general, only patients with risk factors for  severe illness are eligible for treatment. For more information, to see if you are eligible, and to find treatment, go to the [facility]:  [email or web address]/[email or web address]/[email or web address]     Return to the Emergency Department if:    If you are developing worsening breathing, shortness of breath, or feel worse you should seek medical attention.  If you are uncertain, contact your health care provider/clinic. If you need emergency medical attention, call 911 and tell them you have been ill.

## 2021-11-11 ENCOUNTER — NURSE TRIAGE (OUTPATIENT)
Dept: FAMILY MEDICINE | Facility: OTHER | Age: 61
End: 2021-11-11
Payer: COMMERCIAL

## 2021-11-11 ENCOUNTER — PATIENT OUTREACH (OUTPATIENT)
Dept: FAMILY MEDICINE | Facility: OTHER | Age: 61
End: 2021-11-11
Payer: COMMERCIAL

## 2021-11-11 NOTE — TELEPHONE ENCOUNTER
SITUATION:   Patient was dx with COVID last Wednesday. Patient lives in [facility]. The patient first symptoms were a naggy cough and scratchy throat. Patient does not describe difficult breathing or chest pain. Patient feels that her chest hurts due to coughing. Coughing white milky substance. Patient is not wheezing or fevers. Feeling more tired than usual, chills, headache (3/10), back pian (5/10),   Denies lost smell or taste or sore throat.     BACKGROUND:   Seen at [facility] - 11/10/21    HOME TREATMENTS:  Rest and pushing fluids.   Back Pain- Naproxen  Cough- Nighttime gel tab    HEALTH HISTORY:  No history of lung or heart concerns.      PATIENT REQUEST:   Work In    NURSE RECOMMENDATION:   Home Care per protocol.     Cough Home Remedies:       Drink six to eight glasses of water daily.     Warm mist may help improve conditions. Breath through a warm wet washcloth placed over the mouth and nose or sit in a steam-filled bathroom for twenty to thirty minutes.     You could give 1/2 tsp mixed with 1/2 tsp of honey or corn syrup.     Drink warm lemonade, apple cider, or tea to help soothe the cough.     Avoid irritants such as being around smoke, smog, or chemicals.     Turn down the heat, open the windows, or go into cooler air to help suppress cough.     If congested avoid milk products.     Take cough suppressant (ask pharmacists for product suggestions) if cough is interfering with activity, causing chest pain, vomiting, or interrupting night sleep.     Take OTC medication as needed, being sure to follow instructions on the label.   o For a wet cough, use a decongestant; for a dry cough, use an expectorant during the day and a suppressant at night; for an allergy, use an antihistamine or decongestant.       Guideline used: Cough  Telephone Triage Protocols for Nurses, Fifth Edition, Anni Obrien          PLAN:   Scheduled with Grant Conklin and a phone visit.        Maldonado Wyatt, CHERISEN, RN, PHN  Osvaldo Martino/Ian  [facility]  November 11, 2021    Reason for Disposition    [1] COVID-19 diagnosed by positive lab test AND [2] mild symptoms (e.g., cough, fever, others) AND [3] no complications or SOB    Additional Information    Negative: SEVERE difficulty breathing (e.g., struggling for each breath, speaks in single words)    Negative: Difficult to awaken or acting confused (e.g., disoriented, slurred speech)    Negative: Bluish (or gray) lips or face now    Negative: Shock suspected (e.g., cold/pale/clammy skin, too weak to stand, low BP, rapid pulse)    Negative: Sounds like a life-threatening emergency to the triager    Negative: [1] COVID-19 exposure AND [2] no symptoms    Negative: COVID-19 vaccine reaction suspected (e.g., fever, headache, muscle aches) occurring 1 to 3 days after getting vaccine    Negative: COVID-19 vaccine, questions about    Negative: [1] Lives with someone known to have influenza (flu test positive) AND [2] flu-like symptoms (e.g., cough, runny nose, sore throat, SOB; with or without fever)    Negative: [1] Adult with possible COVID-19 symptoms AND [2] triager concerned about severity of symptoms or other causes    Negative: COVID-19 and breastfeeding, questions about    Negative: MODERATE difficulty breathing (e.g., speaks in phrases, SOB even at rest, pulse 100-120)    Negative: SEVERE or constant chest pain or pressure (Exception: mild central chest pain, present only when coughing)    Negative: [1] Headache AND [2] stiff neck (can't touch chin to chest)    Negative: MILD difficulty breathing (e.g., minimal/no SOB at rest, SOB with walking, pulse <100)    Negative: Chest pain or pressure    Negative: Patient sounds very sick or weak to the triager    Negative: Fever > 103 F (39.4 C)    Negative: [1] Fever > 101 F (38.3 C) AND [2] age > 60 years    Negative: [1] Fever > 100.0 F (37.8 C) AND [2] bedridden (e.g., nursing home patient, CVA, chronic illness, recovering from surgery)    Negative:  HIGH RISK for severe COVID complications (e.g., age > 64 years, obesity with BMI > 25, pregnant, chronic lung disease or other chronic medical condition)  (Exception: Already seen by PCP and no new or worsening symptoms.)    Negative: [1] HIGH RISK patient AND [2] influenza is widespread in the community AND [3] ONE OR MORE respiratory symptoms: cough, sore throat, runny or stuffy nose    Negative: [1] HIGH RISK patient AND [2] influenza exposure within the last 7 days AND [3] ONE OR MORE respiratory symptoms: cough, sore throat, runny or stuffy nose    Negative: [1] COVID-19 infection suspected by caller or triager AND [2] mild symptoms (cough, fever, or others) AND [3] negative COVID-19 rapid test    Negative: Fever present > 3 days (72 hours)    Negative: [1] Fever returns after gone for over 24 hours AND [2] symptoms worse or not improved    Negative: [1] Continuous (nonstop) coughing interferes with work or school AND [2] no improvement using cough treatment per protocol    Negative: Cough present > 3 weeks    Protocols used: CORONAVIRUS (COVID-19) DIAGNOSED OR [id number] 8.25.2021

## 2022-03-19 ENCOUNTER — OFFICE VISIT (OUTPATIENT)
Dept: URGENT CARE | Facility: URGENT CARE | Age: 62
End: 2022-03-19
Payer: COMMERCIAL

## 2022-03-19 ENCOUNTER — NURSE TRIAGE (OUTPATIENT)
Dept: NURSING | Facility: CLINIC | Age: 62
End: 2022-03-19
Payer: COMMERCIAL

## 2022-03-19 VITALS
BODY MASS INDEX: 20.63 KG/M2 | TEMPERATURE: 97.2 F | WEIGHT: 124 LBS | OXYGEN SATURATION: 98 % | HEART RATE: 75 BPM | SYSTOLIC BLOOD PRESSURE: 123 MMHG | DIASTOLIC BLOOD PRESSURE: 74 MMHG

## 2022-03-19 DIAGNOSIS — R30.0 DYSURIA: Primary | ICD-10-CM

## 2022-03-19 LAB
ALBUMIN UR-MCNC: NEGATIVE MG/DL
APPEARANCE UR: CLEAR
BILIRUB UR QL STRIP: NEGATIVE
COLOR UR AUTO: YELLOW
GLUCOSE UR STRIP-MCNC: NEGATIVE MG/DL
HGB UR QL STRIP: NEGATIVE
KETONES UR STRIP-MCNC: NEGATIVE MG/DL
LEUKOCYTE ESTERASE UR QL STRIP: NEGATIVE
NITRATE UR QL: NEGATIVE
PH UR STRIP: 7 [PH] (ref 5–7)
SP GR UR STRIP: 1.01 (ref 1–1.03)
UROBILINOGEN UR STRIP-ACNC: 0.2 E.U./DL

## 2022-03-19 PROCEDURE — 81003 URINALYSIS AUTO W/O SCOPE: CPT

## 2022-03-19 PROCEDURE — 99212 OFFICE O/P EST SF 10 MIN: CPT

## 2022-03-19 RX ORDER — COVID-19 ANTIGEN TEST
220 KIT MISCELLANEOUS 2 TIMES DAILY WITH MEALS
COMMUNITY
End: 2022-03-23

## 2022-03-19 NOTE — PROGRESS NOTES
Assessment & Plan     Dysuria  UA normal.  Offered and pt declined wet prep.   Will push fluids and f/u with PCP if persists or worsens.    - UA reflex to Microscopic and Culture       Return in about 2 days (around 3/21/2022) for with regular provider if symptoms persist.    [facility] Urgent Care Provider  [facility] BRYANT Arias is a 62 year old female who presents to clinic today for the following health issues:  Chief Complaint   Patient presents with     Urgent Care     Back Pain     right side lower back pain, stomach bloating  x1week,      HPI    UTI    Onset of symptoms was 1week(s).  Course of illness is waxing and waning  Severity moderate  Current and associated symptoms dysuria and frequency  Treatment and measures tried Increase fluid intake  Predisposing factors include none  Patient denies rigors, flank pain, temperature > 101 degrees F. and vomiting    No vaginal symptoms, no discharge, itching or odor.          Review of Systems  Constitutional, HEENT, cardiovascular, pulmonary, gi and gu systems are negative, except as otherwise noted.      Objective    /74   Pulse 75   Temp 97.2  F (36.2  C) (Oral)   Wt 56.2 kg (124 lb)   LMP 02/01/2006 (Approximate)   SpO2 98%   BMI 20.63 kg/m    Physical Exam   GENERAL: healthy, alert and no distress  NECK: no adenopathy, no asymmetry, masses, or scars and thyroid normal to palpation  RESP: lungs clear to auscultation - no rales, rhonchi or wheezes  CV: regular rate and rhythm, normal S1 S2, no S3 or S4, no murmur, click or rub, no peripheral edema and peripheral pulses strong  ABDOMEN: soft, nontender, no hepatosplenomegaly, no masses and bowel sounds normal  MS: no gross musculoskeletal defects noted, no edema    Results for orders placed or performed in visit on 03/19/22   UA reflex to Microscopic and Culture     Status: Normal    Specimen: Urine, Clean Catch   Result Value Ref Range    Color Urine Yellow Colorless,  Straw, Light Yellow, Yellow    Appearance Urine Clear Clear    Glucose Urine Negative Negative mg/dL    Bilirubin Urine Negative Negative    Ketones Urine Negative Negative mg/dL    Specific Gravity Urine 1.010 1.003 - 1.035    Blood Urine Negative Negative    pH Urine 7.0 5.0 - 7.0    Protein Albumin Urine Negative Negative mg/dL    Urobilinogen Urine 0.2 0.2, 1.0 E.U./dL    Nitrite Urine Negative Negative    Leukocyte Esterase Urine Negative Negative    Narrative    Microscopic not indicated

## 2022-03-19 NOTE — TELEPHONE ENCOUNTER
"Patient calls with concerns regarding lower back pain and bloating. Symptoms started a week ago. Patient is having pain in her lower right back. Pain is a \"constant ache\" that she rates currently a 4 on pain scale. Pain was worse last night around an 8. Movement does not affect pain but pain is worse with touch. Patient denies any urinary concerns. She can not remember when her last good bowel movement was, has been having small harder stools. Patient also reports that her stomach is bloated. She reports that abdomen is visibly swollen and she cannot close her jeans. Denies fever or abdominal pain.     See PCP within 3 days per protocol. Patient would like to be seen today, is advised on urgent care at this time. She is given locations and hours. Patient verbalizes understanding and denies further questions or concerns.    Molly Soni RN  [facility]        Reason for Disposition    [1] MODERATE back pain (e.g., interferes with normal activities) AND [2] present > 3 days    Additional Information    Negative: Passed out (i.e., lost consciousness, collapsed and was not responding)    Negative: Shock suspected (e.g., cold/pale/clammy skin, too weak to stand, low BP, rapid pulse)    Negative: Sounds like a life-threatening emergency to the triager    Negative: Major injury to the back (e.g., MVA, fall > 10 feet or 3 meters, penetrating injury, etc.)    Negative: Followed a tailbone injury    Negative: [1] Pain in the upper back over the ribs (rib cage) AND [2] radiates (travels, goes) into chest    Negative: [1] Pain in the upper back over the ribs (rib cage) AND [2] worsened by coughing (or clearly increases with breathing)    Negative: Back pain during pregnancy    Negative: Pain mainly in flank (i.e., in the side, over the lower ribs or just below the ribs)    Negative: [1] SEVERE back pain (e.g., excruciating) AND [2] sudden onset AND [3] age > 60    Negative: [1] Unable to urinate (or only " "a few drops) > 4 hours AND [2] bladder feels very full (e.g., palpable bladder or strong urge to urinate)    Negative: [1] Loss of bladder or bowel control (urine or bowel incontinence; wetting self, leaking stool) AND [2] new onset    Negative: Numbness in groin or rectal area (i.e., loss of sensation)    Negative: [1] SEVERE abdominal pain AND [2] present > 1 hour    Negative: [1] Abdominal pain AND [2] age > 60    Negative: Weakness of a leg or foot (e.g., unable to bear weight, dragging foot)    Negative: Unable to walk    Negative: Patient sounds very sick or weak to the triager    Negative: [1] SEVERE back pain (e.g., excruciating, unable to do any normal activities) AND [2] not improved 2 hours after pain medicine    Negative: [1] Pain radiates into the thigh or further down the leg AND [2] both legs    Negative: [1] Fever > 100.0 F (37.8 C) AND [2] flank pain (i.e., in side, below ribs and above hip)    Negative: [1] Pain or burning with passing urine (urination) AND [2] flank pain (i.e., in side, below ribs and above hip)    Negative: Numbness in a leg or foot (i.e., loss of sensation)    Negative: [1] Numbness in an arm or hand (i.e., loss of sensation) AND [2] upper back pain    Negative: High-risk adult (e.g., history of cancer, HIV, or IV drug abuse)    Negative: [1] Fever AND [2] no symptoms of UTI  (Exception: has generalized muscle pains, not localized back pain)    Negative: Rash in same area as pain (may be described as \"small blisters\")    Negative: Blood in urine (red, pink, or tea-colored)    Protocols used: BACK PAIN-A-AH  COVID 19 Nurse Triage Plan/Patient Instructions    Please be aware that novel coronavirus (COVID-19) may be circulating in the community. If you develop symptoms such as fever, cough, or SOB or if you have concerns about the presence of another infection including coronavirus (COVID-19), please contact your health care provider or visit [email or web address]. "     Disposition/Instructions    In-Person Visit with provider recommended. Reference Visit Selection Guide.    Thank you for taking steps to prevent the spread of this virus.  o Limit your contact with others.  o Wear a simple mask to cover your cough.  o Wash your hands well and often.    Resources    [facility]: About COVID-19: [email or web address]    CDC: What to Do If You're Sick: www.cdc.gov/coronavirus/2019-ncov/about/steps-when-sick.html    CDC: Ending Home Isolation: www.cdc.gov/coronavirus/2019-ncov/hcp/disposition-in-home-patients.html     CDC: Caring for Someone: www.cdc.gov/coronavirus/2019-ncov/if-you-are-sick/care-for-someone.html     [facility]: Interim Guidance for Hospital Discharge to Home: www.[email or web address].[place]/diseases/coronavirus/hcp/hospdischarge.pdf    [facility] clinical trials (COVID-19 research studies): clinicalaffairs.[facility]-clinical-trials     Below are the COVID-19 hotlines at the [place] Department of [facility] ([facility]). Interpreters are available.   o For health questions: Call 155-043-7035 or 1-935.552.3698 (7 a.m. to 7 p.m.)  o For questions about schools and childcare: Call 112-281-6869 or 1-803.469.6074 (7 a.m. to 7 p.m.)

## 2022-03-21 ENCOUNTER — TELEPHONE (OUTPATIENT)
Dept: FAMILY MEDICINE | Facility: OTHER | Age: 62
End: 2022-03-21
Payer: COMMERCIAL

## 2022-03-21 NOTE — TELEPHONE ENCOUNTER
1015 Wednesday.  Please let her know this is a work in and she may need to wait  Robyn Chung PA-C

## 2022-03-21 NOTE — TELEPHONE ENCOUNTER
Called patient and she states she cannot wait until Thursday (soonest available virtual spot) and would prefer in-person but will do virtual or in-person, but needs the soonest available.     Available Tuesday or Wednesday.     Patient wants an MRI as soon as possible.     Explained to patient the fastest way for this is going through the ED, patient states her insurance does not cover this great, so not an option.     Routing back to provider to advise on soonest appointment available for work-in    DARCI Morelos/[place] [facility]  March 21, 2022

## 2022-03-21 NOTE — TELEPHONE ENCOUNTER
"Patient calling with concerns of \"bloating\"    Was in urgent care on Saturday and was told it's not kidney stones or appendicitis, but unable to tell patient what is going on.     States she may need MRI but  was unable to order this.     Started last Friday. \"I am so bloated I am unable to get my jeans on.\" Subsides overnight, but as the day goes on it gets worse. Feels more bloated after a meal, cannot relate any certain foods.    No fevers, no vomiting, no blood in stool or urine, no abdominal cramping or pain. Urinates fine. No constipation. No other symptoms.     Discomfort in the abdomen \"jiggles up and down when I walk and is different than anything I have ever experienced.\"     recommended stool softner, and this has not helped.    Patient states \"I want to be honest, what I am most worried about is my liver, I drink a lot of beer.\"    Patient states  only tested urine. No labs were taken. Patient is wanting a work-in visit for labs and imaging.     Requesting to be worked in over the next few days. States she works today. Can this be done with a virtual visit on 3/24? Since patient was seen in .    Routing to provider to review.    DARCI Morelos/[place] [facility]  March 21, 2022      "

## 2022-03-21 NOTE — TELEPHONE ENCOUNTER
Called patient and notified her.    Patient states this is fine, and she does not mind waiting, appreciates the help.    Patient scheduled for 3/23 at 10:15.    Waleska Barcenas RN  [facility] [facility]  March 21, 2022

## 2022-03-22 ENCOUNTER — TELEPHONE (OUTPATIENT)
Dept: FAMILY MEDICINE | Facility: OTHER | Age: 62
End: 2022-03-22
Payer: COMMERCIAL

## 2022-03-22 NOTE — TELEPHONE ENCOUNTER
It will be easier to make a plan and decide what if any imaging is necessary once I have seen her.  Please have her keep her appointment  Robyn Chung PA-C

## 2022-03-22 NOTE — TELEPHONE ENCOUNTER
Left generic message for pt indicating that she should keep appointment to figure out plan. Call back with any questions.     Alecia Gonzalez, CHERISEN, RN, PHN  Registered Nurse-Clinic Triage  [facility]  3/22/2022 at 2:58 PM

## 2022-03-22 NOTE — TELEPHONE ENCOUNTER
Pt is dealing with some bloating. It started 3/18. She went to urgent care on 3/19 and they told her there was nothing that they can do.     Pt states that it isn't painful, just uncomfortable. She states that she can't fit in her jeans. She states that her symptoms are not worsening. She believes that she will need imagining but was wondering how long that would take to get that done as an outpatient? Advised her that provider would determine the urgency. It's also possible that she may not even need imaging.      Pt is leaving 3/30 for vacation and was hoping to have some answers before then. Would she be able to have outpatient imaging, if needed, prior to then? Should she go to the ER to have it all done right away?    Advised pt, that if she is in pain or symptoms worsen, pt should go to ER. Recommended that she keep appointment with PCP unless she hears otherwise. Pt verbalizes understanding.    PCP-any further comments on imaging or pt concerns?    Alecia Gonzalez, CHERISEN, RN, PHN  Registered Nurse-Clinic Triage  [facility]  3/22/2022 at 2:10 PM

## 2022-03-23 ENCOUNTER — OFFICE VISIT (OUTPATIENT)
Dept: FAMILY MEDICINE | Facility: OTHER | Age: 62
End: 2022-03-23
Payer: COMMERCIAL

## 2022-03-23 ENCOUNTER — ANCILLARY PROCEDURE (OUTPATIENT)
Dept: GENERAL RADIOLOGY | Facility: OTHER | Age: 62
End: 2022-03-23
Attending: PHYSICIAN ASSISTANT
Payer: COMMERCIAL

## 2022-03-23 ENCOUNTER — ANCILLARY PROCEDURE (OUTPATIENT)
Dept: ULTRASOUND IMAGING | Facility: OTHER | Age: 62
End: 2022-03-23
Attending: PHYSICIAN ASSISTANT
Payer: COMMERCIAL

## 2022-03-23 ENCOUNTER — TELEPHONE (OUTPATIENT)
Dept: FAMILY MEDICINE | Facility: OTHER | Age: 62
End: 2022-03-23

## 2022-03-23 VITALS
SYSTOLIC BLOOD PRESSURE: 110 MMHG | OXYGEN SATURATION: 98 % | DIASTOLIC BLOOD PRESSURE: 78 MMHG | HEART RATE: 84 BPM | HEIGHT: 65 IN | TEMPERATURE: 97.3 F | WEIGHT: 124 LBS | BODY MASS INDEX: 20.66 KG/M2 | RESPIRATION RATE: 18 BRPM

## 2022-03-23 DIAGNOSIS — K59.00 CONSTIPATION, UNSPECIFIED CONSTIPATION TYPE: ICD-10-CM

## 2022-03-23 DIAGNOSIS — R14.0 ABDOMINAL BLOATING: ICD-10-CM

## 2022-03-23 DIAGNOSIS — R14.0 ABDOMINAL BLOATING: Primary | ICD-10-CM

## 2022-03-23 LAB
ALBUMIN SERPL-MCNC: 4.4 G/DL (ref 3.4–5)
ALP SERPL-CCNC: 68 U/L (ref 40–150)
ALT SERPL W P-5'-P-CCNC: 17 U/L (ref 0–50)
ANION GAP SERPL CALCULATED.3IONS-SCNC: 3 MMOL/L (ref 3–14)
AST SERPL W P-5'-P-CCNC: 16 U/L (ref 0–45)
BILIRUB SERPL-MCNC: 1 MG/DL (ref 0.2–1.3)
BUN SERPL-MCNC: 10 MG/DL (ref 7–30)
CALCIUM SERPL-MCNC: 9.5 MG/DL (ref 8.5–10.1)
CHLORIDE BLD-SCNC: 106 MMOL/L (ref 94–109)
CO2 SERPL-SCNC: 27 MMOL/L (ref 20–32)
CREAT SERPL-MCNC: 0.77 MG/DL (ref 0.52–1.04)
ERYTHROCYTE [DISTWIDTH] IN BLOOD BY AUTOMATED COUNT: 14 % (ref 10–15)
GFR SERPL CREATININE-BSD FRML MDRD: 87 ML/MIN/1.73M2
GLUCOSE BLD-MCNC: 95 MG/DL (ref 70–99)
HCT VFR BLD AUTO: 41.3 % (ref 35–47)
HGB BLD-MCNC: 13.7 G/DL (ref 11.7–15.7)
MCH RBC QN AUTO: 31.1 PG (ref 26.5–33)
MCHC RBC AUTO-ENTMCNC: 33.2 G/DL (ref 31.5–36.5)
MCV RBC AUTO: 94 FL (ref 78–100)
PLATELET # BLD AUTO: 402 10E3/UL (ref 150–450)
POTASSIUM BLD-SCNC: 4.3 MMOL/L (ref 3.4–5.3)
PROT SERPL-MCNC: 7.4 G/DL (ref 6.8–8.8)
RBC # BLD AUTO: 4.4 10E6/UL (ref 3.8–5.2)
SODIUM SERPL-SCNC: 136 MMOL/L (ref 133–144)
WBC # BLD AUTO: 8.5 10E3/UL (ref 4–11)

## 2022-03-23 PROCEDURE — 80053 COMPREHEN METABOLIC PANEL: CPT | Performed by: PHYSICIAN ASSISTANT

## 2022-03-23 PROCEDURE — 74019 RADEX ABDOMEN 2 VIEWS: CPT | Performed by: RADIOLOGY

## 2022-03-23 PROCEDURE — 36415 COLL VENOUS BLD VENIPUNCTURE: CPT | Performed by: PHYSICIAN ASSISTANT

## 2022-03-23 PROCEDURE — 76830 TRANSVAGINAL US NON-OB: CPT | Performed by: RADIOLOGY

## 2022-03-23 PROCEDURE — 76856 US EXAM PELVIC COMPLETE: CPT | Performed by: RADIOLOGY

## 2022-03-23 PROCEDURE — 85027 COMPLETE CBC AUTOMATED: CPT | Performed by: PHYSICIAN ASSISTANT

## 2022-03-23 PROCEDURE — 99214 OFFICE O/P EST MOD 30 MIN: CPT | Performed by: PHYSICIAN ASSISTANT

## 2022-03-23 ASSESSMENT — PAIN SCALES - GENERAL: PAINLEVEL: NO PAIN (0)

## 2022-03-23 NOTE — TELEPHONE ENCOUNTER
Called patient with her imaging results and she asked about her blood work so I read her the note on that as well. No need to send letter

## 2022-03-23 NOTE — PROGRESS NOTES
Assessment & Plan     Abdominal bloating  May be related to constipation versus food intolerance, would consider food diary if this continues  - CBC with platelets; Future  - Comprehensive metabolic panel; Future  - XR Abdomen 2 Views; Future  - US Pelvic Complete with Transvaginal; Future  - CBC with platelets  - Comprehensive metabolic panel  Her pelvic ultrasound is negative for any ovarian pathology which is reassuring    Constipation, unspecified constipation type  Improving, continue to use MiraLAX, stool softeners or Colace in order to have routine soft formed stools she will continue to increase her fluids and follow-up if this is not improving she is up-to-date on her colonoscopy  - CBC with platelets; Future  - Comprehensive metabolic panel; Future  - XR Abdomen 2 Views; Future  - CBC with platelets  - Comprehensive metabolic panel        No follow-ups on file.    Robyn Chung PA-C  [facility]    Lilia Arias is a 62 year old who presents for the following health issues     Bloating that started around 3/18/22, Urgent care on 3/19 and was told nothing that they can do. No pain but uncomfortable.  It is a pressure sensation she had to unbutton her pants at work the other day because of the bloating.  She did have some right back pain that was sore to touch it was more severe than her typical back pain.  This is improved now.  She had not had a bowel movement 2 to 3 days prior to the onset of the bloating.  She does not have a daily bowel movement and this is not all that unusual.  She typically does not blow like this.  She is not had any pelvic pain no dysuria.  She did have a small bowel movement today she has been using a stool softener but eventually switched to Colace when that was not working.  She has no abdominal pain at all.  She is unaware of any dietary changes that may have triggered this, she has not been sick.      History of Present Illness       Reason for  "visit:  Abdominal bloating  Symptom onset:  3-7 days ago  Symptoms include:  Abdominal bloating  Symptom intensity:  Moderate  Symptom progression:  Improving  Had these symptoms before:  No  What makes it worse:  No  What makes it better:  No    She eats 0-1 servings of fruits and vegetables daily.She consumes 0 sweetened beverage(s) daily.She exercises with enough effort to increase her heart rate 9 or less minutes per day.  She exercises with enough effort to increase her heart rate 3 or less days per week.   She is taking medications regularly.         Review of Systems   As documented above       Objective    /78   Pulse 84   Temp 97.3  F (36.3  C) (Temporal)   Resp 18   Ht 1.651 m (5' 5\")   Wt 56.2 kg (124 lb)   LMP 02/01/2006 (Approximate)   SpO2 98%   BMI 20.63 kg/m    Body mass index is 20.63 kg/m .  Physical Exam   GENERAL: healthy, alert and no distress  NECK: no adenopathy, no asymmetry, masses, or scars and thyroid normal to palpation  RESP: lungs clear to auscultation - no rales, rhonchi or wheezes  CV: regular rate and rhythm, normal S1 S2, no S3 or S4, no murmur, click or rub, no peripheral edema and peripheral pulses strong  ABDOMEN: soft, nontender, no hepatosplenomegaly, no masses and bowel sounds normal  MS: no gross musculoskeletal defects noted, no edema  BACK: no CVA tenderness, no paralumbar tenderness  PSYCH: mentation appears normal, affect normal/bright    Results for orders placed or performed in visit on 03/23/22   US Pelvic Complete with Transvaginal     Status: None    Narrative    ULTRASOUND PELVIC TRANSABDOMINAL AND TRANSVAGINAL March 23, 2022 11:55  AM    CLINICAL HISTORY: Abdominal bloating.    TECHNIQUE: Transabdominal scans were performed. Endovaginal ultrasound  was performed to better visualize the adnexa.    COMPARISON: None.    FINDINGS:    UTERUS: 5.3 x 2.5 x 4.1 cm. Normal in size and position with no  masses.    ENDOMETRIUM: 2 mm. Normal smooth endometrium. " Trace endometrial fluid.    RIGHT OVARY: 1.6 x 0.9 x 0.8 cm. Normal with flow demonstrated.    LEFT OVARY: 2.0 x 0.9 x 1.1 cm. Normal with flow demonstrated.    No significant free fluid.      Impression    IMPRESSION: No specific abnormality identified.      HUGO HUNG MD         SYSTEM ID:  [id number]   Results for orders placed or performed in visit on 03/23/22   XR Abdomen 2 Views     Status: None    Narrative    ABDOMEN TWO VIEWS  March 23, 2022 10:56 AM     HISTORY: Abdominal bloating. Constipation, unspecified constipation  type.    COMPARISON: None.      Impression    IMPRESSION: Nonobstructive bowel gas pattern. No free air. Some  degenerative changes of the spine noted.    HUGO HUNG MD         SYSTEM ID:  [id number]   Results for orders placed or performed in visit on 03/23/22   CBC with platelets     Status: Normal   Result Value Ref Range    WBC Count 8.5 4.0 - 11.0 10e3/uL    RBC Count 4.40 3.80 - 5.20 10e6/uL    Hemoglobin 13.7 11.7 - 15.7 g/dL    Hematocrit 41.3 35.0 - 47.0 %    MCV 94 78 - 100 fL    MCH 31.1 26.5 - 33.0 pg    MCHC 33.2 31.5 - 36.5 g/dL    RDW 14.0 10.0 - 15.0 %    Platelet Count 402 150 - 450 10e3/uL   Comprehensive metabolic panel     Status: Normal   Result Value Ref Range    Sodium 136 133 - 144 mmol/L    Potassium 4.3 3.4 - 5.3 mmol/L    Chloride 106 94 - 109 mmol/L    Carbon Dioxide (CO2) 27 20 - 32 mmol/L    Anion Gap 3 3 - 14 mmol/L    Urea Nitrogen 10 7 - 30 mg/dL    Creatinine 0.77 0.52 - 1.04 mg/dL    Calcium 9.5 8.5 - 10.1 mg/dL    Glucose 95 70 - 99 mg/dL    Alkaline Phosphatase 68 40 - 150 U/L    AST 16 0 - 45 U/L    ALT 17 0 - 50 U/L    Protein Total 7.4 6.8 - 8.8 g/dL    Albumin 4.4 3.4 - 5.0 g/dL    Bilirubin Total 1.0 0.2 - 1.3 mg/dL    GFR Estimate 87 >60 mL/min/1.73m2

## 2022-03-23 NOTE — LETTER
March 23, 2022      Juana Morton  [address]   [facility] [address]        Dear ,    We are writing to inform you of your test results.    Your complete blood count, kidney functions, liver functions, electrolytes and blood sugar are normal.       Resulted Orders   CBC with platelets   Result Value Ref Range    WBC Count 8.5 4.0 - 11.0 10e3/uL    RBC Count 4.40 3.80 - 5.20 10e6/uL    Hemoglobin 13.7 11.7 - 15.7 g/dL    Hematocrit 41.3 35.0 - 47.0 %    MCV 94 78 - 100 fL    MCH 31.1 26.5 - 33.0 pg    MCHC 33.2 31.5 - 36.5 g/dL    RDW 14.0 10.0 - 15.0 %    Platelet Count 402 150 - 450 10e3/uL   Comprehensive metabolic panel   Result Value Ref Range    Sodium 136 133 - 144 mmol/L    Potassium 4.3 3.4 - 5.3 mmol/L    Chloride 106 94 - 109 mmol/L    Carbon Dioxide (CO2) 27 20 - 32 mmol/L    Anion Gap 3 3 - 14 mmol/L    Urea Nitrogen 10 7 - 30 mg/dL    Creatinine 0.77 0.52 - 1.04 mg/dL    Calcium 9.5 8.5 - 10.1 mg/dL    Glucose 95 70 - 99 mg/dL    Alkaline Phosphatase 68 40 - 150 U/L    AST 16 0 - 45 U/L    ALT 17 0 - 50 U/L    Protein Total 7.4 6.8 - 8.8 g/dL    Albumin 4.4 3.4 - 5.0 g/dL    Bilirubin Total 1.0 0.2 - 1.3 mg/dL    GFR Estimate 87 >60 mL/min/1.73m2      Comment:      Effective December 21, 2021 eGFRcr in adults is calculated using the 2021 CKD-EPI creatinine equation which includes age and gender (Az et al., NEJM, DOI: 10.1056/JGYViu0375766)       If you have any questions or concerns, please call the clinic at the number listed above.       Sincerely,      Robyn Chung PA-C

## 2022-08-15 ENCOUNTER — TELEPHONE (OUTPATIENT)
Dept: FAMILY MEDICINE | Facility: OTHER | Age: 62
End: 2022-08-15

## 2022-08-15 DIAGNOSIS — M79.671 RIGHT FOOT PAIN: Primary | ICD-10-CM

## 2022-08-15 NOTE — TELEPHONE ENCOUNTER
Reason for Call:  Call Back     Detailed comments: Patient called because she is been having issues with her foot (Right) and she wants to get a referral with podiatry.     Phone Number Patient can be reached at: Home number on file [phone] (home)    Best Time: Any time (Before 2:00 pm)    Can we leave a detailed message on this number? YES    Call taken on 8/15/2022 at 11:15 AM by Trisha Bueno

## 2022-08-15 NOTE — TELEPHONE ENCOUNTER
Staff called and scheduled PE with Provider for October and notified PT that referral was placed and podiatry will be reaching her to schedule.

## 2022-08-31 ENCOUNTER — OFFICE VISIT (OUTPATIENT)
Dept: PODIATRY | Facility: OTHER | Age: 62
End: 2022-08-31

## 2022-08-31 ENCOUNTER — ANCILLARY PROCEDURE (OUTPATIENT)
Dept: GENERAL RADIOLOGY | Facility: OTHER | Age: 62
End: 2022-08-31
Attending: PODIATRIST
Payer: COMMERCIAL

## 2022-08-31 VITALS
HEIGHT: 65 IN | WEIGHT: 127 LBS | BODY MASS INDEX: 21.16 KG/M2 | SYSTOLIC BLOOD PRESSURE: 132 MMHG | DIASTOLIC BLOOD PRESSURE: 70 MMHG

## 2022-08-31 DIAGNOSIS — M79.671 RIGHT FOOT PAIN: ICD-10-CM

## 2022-08-31 DIAGNOSIS — Q66.6 PES VALGUS OF RIGHT FOOT: ICD-10-CM

## 2022-08-31 DIAGNOSIS — M20.11 HALLUX VALGUS OF RIGHT FOOT: ICD-10-CM

## 2022-08-31 DIAGNOSIS — M20.11 HALLUX VALGUS OF RIGHT FOOT: Primary | ICD-10-CM

## 2022-08-31 PROCEDURE — 73630 X-RAY EXAM OF FOOT: CPT | Mod: TC | Performed by: RADIOLOGY

## 2022-08-31 PROCEDURE — 99203 OFFICE O/P NEW LOW 30 MIN: CPT | Performed by: PODIATRIST

## 2022-08-31 ASSESSMENT — PAIN SCALES - GENERAL: PAINLEVEL: MILD PAIN (2)

## 2022-08-31 NOTE — PROGRESS NOTES
HPI:  Kandice Morton is a 62 year old female who is seen in consultation at the request of IDRIS Kramer, CNP    Pt presents for eval of:   (Onset, Location, L/R, Character, Treatments, Injury if yes)    XR Right foot 8/31/2022 and 7/18/2016     Ongoing medial bunion and plantar ball of Right foot pain. July 2022 dorsal Right foot pain that radiates to lower Right leg.  LOV 7/18/2016. Presents with flip flops.  Constant, dull ache. Intermittent sharp, pain 2-8/10    Orthotics ordered n 7/2016 and Good Feet inserts Feb 2022 unable to wear due to back issues and feel like walking on anselmo, to firm.  Chiropractic adjustments.  Wears Dr. Duran's OTC inserts    Works as a .    ROS:  10 point ROS neg other than the symptoms noted above in the HPI.    Patient Active Problem List   Diagnosis     Tobacco use disorder     CARDIOVASCULAR SCREENING; LDL GOAL LESS THAN 130     Neck pain     History of colonic polyps       PAST MEDICAL HISTORY:   Past Medical History:   Diagnosis Date     Genital herpes, unspecified      Subdural hemorrhage following injury, without mention of open intracranial wound, unspecified state of consciousness 1985     Tobacco use disorder      TRAUMATIC SUBDURAL HEMORRH 12/27/2006        PAST SURGICAL HISTORY:   Past Surgical History:   Procedure Laterality Date     COLONOSCOPY N/A 11/6/2018    Procedure: COMBINED COLONOSCOPY with polypectomy by [facility];  Surgeon: Benja Ventura MD;  Location: [facility] OPEN SKULL EVAC HEMATOMA, SUPRATENTORIAL, SUB/ EXTRADURAL  1985        MEDICATIONS:   Current Outpatient Medications:      ASPIRIN NOT PRESCRIBED (INTENTIONAL), Antiplatelet medication not prescribed intentionally due to Hx of gastrointestinal or intracranial bleed (Patient not taking: Reported on 10/7/2020), Disp: 1 each, Rfl: 0     ALLERGIES:    Allergies   Allergen Reactions     Sulfa Drugs         SOCIAL HISTORY:   Social History     Socioeconomic History     Marital status:  "     Spouse name: Prudencio     Number of children: 3     Years of education: 13     Highest education level: Not on file   Occupational History     Occupation:    Tobacco Use     Smoking status: Current Every Day Smoker     Packs/day: 1.00     Years: 25.00     Pack years: 25.00     Types: Cigarettes     Smokeless tobacco: Never Used   Vaping Use     Vaping Use: Never used   Substance and Sexual Activity     Alcohol use: Yes     Comment: occasionally     Drug use: No     Sexual activity: Yes     Partners: Male     Birth control/protection: Surgical, Male Surgical     Comment: vas   Other Topics Concern      Service No     Blood Transfusions Yes     Comment: 1985     Caffeine Concern No     Occupational Exposure No     Hobby Hazards No     Sleep Concern No     Stress Concern No     Weight Concern No     Special Diet No     Back Care No     Exercise Yes     Bike Helmet No     Seat Belt Yes     Self-Exams No     Parent/sibling w/ CABG, MI or angioplasty before 65F 55M? No   Social History Narrative    Works as a      Social Determinants of Health     Financial Resource Strain: Not on file   Food Insecurity: Not on file   Transportation Needs: Not on file   Physical Activity: Not on file   Stress: Not on file   Social Connections: Not on file   Intimate Partner Violence: Not on file   Housing Stability: Not on file        FAMILY HISTORY:   Family History   Problem Relation Age of Onset     Diabetes Mother         Type II     Neurologic Disorder Mother         Migraines     Genitourinary Problems Mother         Colitis- ulcerative     Hypertension Father      Diabetes Father         Type II     Cancer Father         lung        EXAM:Vitals: /70 (BP Location: Right arm, Patient Position: Sitting, Cuff Size: Adult Regular)   Ht 1.651 m (5' 5\")   Wt 57.6 kg (127 lb)   LMP 02/01/2006 (Approximate)   BMI 21.13 kg/m    BMI= Body mass index is 21.13 kg/m .    General appearance: Patient " is alert and fully cooperative with history & exam.  No sign of distress is noted during the visit.     Psychiatric: Affect is pleasant & appropriate.  Patient appears motivated to improve health.     Respiratory: Breathing is regular & unlabored while sitting.     HEENT: Hearing is intact to spoken word.  Speech is clear.  No gross evidence of visual impairment that would impact ambulation.     Vascular: DP & PT pulses are intact & regular bilaterally.  No significant edema or varicosities noted.  CFT and skin temperature is normal to both lower extremities.     Neurologic: Lower extremity sensation is intact to light touch.  No evidence of weakness or contracture in the lower extremities.  No evidence of neuropathy.    Dermatologic: Skin is intact to both lower extremities with adequate texture, turgor and tone about the integument.  No paronychia or evidence of soft tissue infection is noted.     Musculoskeletal: Patient is ambulatory without assistive device or brace.  Generalized valgus noted bilateral.  There is crepitus throughout the midfoot.  Manual muscle strength is 5/5 to all 4 quadrants.  No weakness noted.  No localized edema.  No guarding throughout gait.    Radiographs: No acute fracture or joint dislocation elevated intermetatarsal angle and pes valgus noted bilateral.     ASSESSMENT:       ICD-10-CM    1. Hallux valgus of right foot  M20.11 XR Foot Right G/E 3 Views     Orthotics and Prosthetics DME Orthotic; Foot Orthotics; Bilateral   2. Right foot pain  M79.671 Orthopedic  Referral   3. Pes valgus of right foot  Q66.6 Orthotics and Prosthetics DME Orthotic; Foot Orthotics; Bilateral        PLAN:  Reviewed patient's chart in [facility].      8/31/2022   Obtained and interpreted radiographs  Recommend custom molded orthotics and appropriate shoe gear.  Wearing flexible flimsy flip-flops will not support her foot and will not reduce her symptoms.  She has had orthotics in the past but does not  like wearing them.  We discussed midfoot arthrodesis and hallux valgus correction and surgical outcomes and expectations with this.  May consider an injection if we can localize her symptoms to 1 joint if she would like to move forward with that.  All questions were answered.  Follow-up as needed      Rubin Maza DPM

## 2022-08-31 NOTE — LETTER
8/31/2022         RE: Kandice Morton  [address]  [facility] [address]        Dear Colleague,    Thank you for referring your patient, Kandice Morton, to the [facility]. Please see a copy of my visit note below.    HPI:  Kandice Morton is a 62 year old female who is seen in consultation at the request of Marcelle Yee APRN, CNP    Pt presents for eval of:   (Onset, Location, L/R, Character, Treatments, Injury if yes)    XR Right foot 8/31/2022 and 7/18/2016     Ongoing medial bunion and plantar ball of Right foot pain. July 2022 dorsal Right foot pain that radiates to lower Right leg.  LOV 7/18/2016. Presents with flip flops.  Constant, dull ache. Intermittent sharp, pain 2-8/10    Orthotics ordered n 7/2016 and Good Feet inserts Feb 2022 unable to wear due to back issues and feel like walking on anselmo, to firm.  Chiropractic adjustments.  Wears Dr. Duran's OTC inserts    Works as a .    ROS:  10 point ROS neg other than the symptoms noted above in the HPI.    Patient Active Problem List   Diagnosis     Tobacco use disorder     CARDIOVASCULAR SCREENING; LDL GOAL LESS THAN 130     Neck pain     History of colonic polyps       PAST MEDICAL HISTORY:   Past Medical History:   Diagnosis Date     Genital herpes, unspecified      Subdural hemorrhage following injury, without mention of open intracranial wound, unspecified state of consciousness 1985     Tobacco use disorder      TRAUMATIC SUBDURAL HEMORRH 12/27/2006        PAST SURGICAL HISTORY:   Past Surgical History:   Procedure Laterality Date     COLONOSCOPY N/A 11/6/2018    Procedure: COMBINED COLONOSCOPY with polypectomy by [facility];  Surgeon: Benja Ventura MD;  Location:  [facility] OPEN SKULL EVAC HEMATOMA, SUPRATENTORIAL, SUB/ EXTRADURAL  1985        MEDICATIONS:   Current Outpatient Medications:      ASPIRIN NOT PRESCRIBED (INTENTIONAL), Antiplatelet medication not prescribed intentionally due to Hx  of gastrointestinal or intracranial bleed (Patient not taking: Reported on 10/7/2020), Disp: 1 each, Rfl: 0     ALLERGIES:    Allergies   Allergen Reactions     Sulfa Drugs         SOCIAL HISTORY:   Social History     Socioeconomic History     Marital status:      Spouse name: Prudencio     Number of children: 3     Years of education: 13     Highest education level: Not on file   Occupational History     Occupation:    Tobacco Use     Smoking status: Current Every Day Smoker     Packs/day: 1.00     Years: 25.00     Pack years: 25.00     Types: Cigarettes     Smokeless tobacco: Never Used   Vaping Use     Vaping Use: Never used   Substance and Sexual Activity     Alcohol use: Yes     Comment: occasionally     Drug use: No     Sexual activity: Yes     Partners: Male     Birth control/protection: Surgical, Male Surgical     Comment: vas   Other Topics Concern      Service No     Blood Transfusions Yes     Comment: 1985     Caffeine Concern No     Occupational Exposure No     Hobby Hazards No     Sleep Concern No     Stress Concern No     Weight Concern No     Special Diet No     Back Care No     Exercise Yes     Bike Helmet No     Seat Belt Yes     Self-Exams No     Parent/sibling w/ CABG, MI or angioplasty before 65F 55M? No   Social History Narrative    Works as a      Social Determinants of Health     Financial Resource Strain: Not on file   Food Insecurity: Not on file   Transportation Needs: Not on file   Physical Activity: Not on file   Stress: Not on file   Social Connections: Not on file   Intimate Partner Violence: Not on file   Housing Stability: Not on file        FAMILY HISTORY:   Family History   Problem Relation Age of Onset     Diabetes Mother         Type II     Neurologic Disorder Mother         Migraines     Genitourinary Problems Mother         Colitis- ulcerative     Hypertension Father      Diabetes Father         Type II     Cancer Father         lung       "  EXAM:Vitals: /70 (BP Location: Right arm, Patient Position: Sitting, Cuff Size: Adult Regular)   Ht 1.651 m (5' 5\")   Wt 57.6 kg (127 lb)   LMP 02/01/2006 (Approximate)   BMI 21.13 kg/m    BMI= Body mass index is 21.13 kg/m .    General appearance: Patient is alert and fully cooperative with history & exam.  No sign of distress is noted during the visit.     Psychiatric: Affect is pleasant & appropriate.  Patient appears motivated to improve health.     Respiratory: Breathing is regular & unlabored while sitting.     HEENT: Hearing is intact to spoken word.  Speech is clear.  No gross evidence of visual impairment that would impact ambulation.     Vascular: DP & PT pulses are intact & regular bilaterally.  No significant edema or varicosities noted.  CFT and skin temperature is normal to both lower extremities.     Neurologic: Lower extremity sensation is intact to light touch.  No evidence of weakness or contracture in the lower extremities.  No evidence of neuropathy.    Dermatologic: Skin is intact to both lower extremities with adequate texture, turgor and tone about the integument.  No paronychia or evidence of soft tissue infection is noted.     Musculoskeletal: Patient is ambulatory without assistive device or brace.  Generalized valgus noted bilateral.  There is crepitus throughout the midfoot.  Manual muscle strength is 5/5 to all 4 quadrants.  No weakness noted.  No localized edema.  No guarding throughout gait.    Radiographs: No acute fracture or joint dislocation elevated intermetatarsal angle and pes valgus noted bilateral.     ASSESSMENT:       ICD-10-CM    1. Hallux valgus of right foot  M20.11 XR Foot Right G/E 3 Views     Orthotics and Prosthetics DME Orthotic; Foot Orthotics; Bilateral   2. Right foot pain  M79.671 Orthopedic  Referral   3. Pes valgus of right foot  Q66.6 Orthotics and Prosthetics DME Orthotic; Foot Orthotics; Bilateral        PLAN:  Reviewed patient's chart " in epic.      8/31/2022   Obtained and interpreted radiographs  Recommend custom molded orthotics and appropriate shoe gear.  Wearing flexible flimsy flip-flops will not support her foot and will not reduce her symptoms.  She has had orthotics in the past but does not like wearing them.  We discussed midfoot arthrodesis and hallux valgus correction and surgical outcomes and expectations with this.  May consider an injection if we can localize her symptoms to 1 joint if she would like to move forward with that.  All questions were answered.  Follow-up as needed      Rubin Maza DPM              Again, thank you for allowing me to participate in the care of your patient.        Sincerely,        Rubin Maza DPM

## 2022-08-31 NOTE — PATIENT INSTRUCTIONS
Reliable shoe stores: To maximize your experience and provide the best possible fit.  Be sure to show them your foot concerns and tell them Dr. Maza sent you.      Stores listed in bold have only athletic shoes, and stores that are not bold are mostly casual or variety of shoes    [employer]  [address] [place] [address]  [phone]    [employer] [place]  [address] [place] [address]  [phone]     [employer]  [address] [place] [address]  [phone]    [employer]  [address] [facility] [address]  [phone]    [employer]  [address] [place], [place] [address] [phone]    [employer]  [address] [place] [address]  [phone]                         [employer] Locations:     [address] [place] [address]   [phone]     [address] [place] [address] [address] [place] [address]   [phone]     [address] [place] [address]   [phone]     [address] [facility] [place] [address]   [phone]     [address] [place] [address]   [phone]     [address] [place] [address]   [phone]     [address] [facility] [address]   [place] [address]   [phone]     [address] [facility]   [place] [address]   [phone]             How to find reasonable shoes          The correct width    Correct Fitting    Correct Length      Foot Distortion    Posture Distortion                          Torsional Rigidity      Grasp behind the heel and underneath the foot and twist      Bad    Excessive torsion/twist in midfoot     Less torsion/twist in midfoot is better                   Heel Counter Rigidity      Grasp just above   midsole and squeeze      Bad    Soft heel counter      Good    Rigid Heel Counter      Flexion Rigidity      Grasp shoe and bend from forefoot to rearfoot

## 2022-10-18 ENCOUNTER — OFFICE VISIT (OUTPATIENT)
Dept: FAMILY MEDICINE | Facility: CLINIC | Age: 62
End: 2022-10-18
Payer: COMMERCIAL

## 2022-10-18 VITALS
RESPIRATION RATE: 18 BRPM | BODY MASS INDEX: 21.94 KG/M2 | WEIGHT: 128.5 LBS | OXYGEN SATURATION: 96 % | DIASTOLIC BLOOD PRESSURE: 80 MMHG | HEART RATE: 91 BPM | TEMPERATURE: 99 F | HEIGHT: 64 IN | SYSTOLIC BLOOD PRESSURE: 135 MMHG

## 2022-10-18 DIAGNOSIS — M54.9 BACK PAIN, UNSPECIFIED BACK LOCATION, UNSPECIFIED BACK PAIN LATERALITY, UNSPECIFIED CHRONICITY: ICD-10-CM

## 2022-10-18 DIAGNOSIS — Z12.31 VISIT FOR SCREENING MAMMOGRAM: ICD-10-CM

## 2022-10-18 DIAGNOSIS — Z00.00 ROUTINE GENERAL MEDICAL EXAMINATION AT A HEALTH CARE FACILITY: Primary | ICD-10-CM

## 2022-10-18 DIAGNOSIS — Z23 NEED FOR VACCINATION: ICD-10-CM

## 2022-10-18 DIAGNOSIS — Z12.31 ENCOUNTER FOR SCREENING MAMMOGRAM FOR BREAST CANCER: ICD-10-CM

## 2022-10-18 DIAGNOSIS — Z13.6 CARDIOVASCULAR SCREENING; LDL GOAL LESS THAN 130: ICD-10-CM

## 2022-10-18 PROCEDURE — 99396 PREV VISIT EST AGE 40-64: CPT | Mod: 25 | Performed by: PHYSICIAN ASSISTANT

## 2022-10-18 PROCEDURE — 90471 IMMUNIZATION ADMIN: CPT | Performed by: PHYSICIAN ASSISTANT

## 2022-10-18 PROCEDURE — 90715 TDAP VACCINE 7 YRS/> IM: CPT | Performed by: PHYSICIAN ASSISTANT

## 2022-10-18 RX ORDER — ACETAMINOPHEN AND CODEINE PHOSPHATE 300; 30 MG/1; MG/1
1-2 TABLET ORAL EVERY 4 HOURS PRN
Qty: 28 TABLET | Refills: 0 | Status: SHIPPED | OUTPATIENT
Start: 2022-10-18 | End: 2023-10-25

## 2022-10-18 ASSESSMENT — ENCOUNTER SYMPTOMS
ARTHRALGIAS: 1
BREAST MASS: 0
MYALGIAS: 1

## 2022-10-18 NOTE — NURSING NOTE
Prior to immunization administration, verified patients identity using patient s name and date of birth. Please see Immunization Activity for additional information.     Screening Questionnaire for Adult Immunization    Are you sick today?   No   Do you have allergies to medications, food, a vaccine component or latex?   Yes   Have you ever had a serious reaction after receiving a vaccination?   No   Do you have a long-term health problem with heart, lung, kidney, or metabolic disease (e.g., diabetes), asthma, a blood disorder, no spleen, complement component deficiency, a cochlear implant, or a spinal fluid leak?  Are you on long-term aspirin therapy?   No   Do you have cancer, leukemia, HIV/AIDS, or any other immune system problem?   No   Do you have a parent, brother, or sister with an immune system problem?   No   In the past 3 months, have you taken medications that affect  your immune system, such as prednisone, other steroids, or anticancer drugs; drugs for the treatment of rheumatoid arthritis, Crohn s disease, or psoriasis; or have you had radiation treatments?   No   Have you had a seizure, or a brain or other nervous system problem?   No   During the past year, have you received a transfusion of blood or blood    products, or been given immune (gamma) globulin or antiviral drug?   No   For women: Are you pregnant or is there a chance you could become       pregnant during the next month?   No   Have you received any vaccinations in the past 4 weeks?   No     Immunization questionnaire was positive for at least one answer.  Notified provider.        Per orders of Robyn Chung PA-C, injection of tdap given by Pauline Busch CMA. Patient instructed to remain in clinic for 15 minutes afterwards, and to report any adverse reaction to me immediately.       Screening performed by Pauline Busch CMA on 10/18/2022 at 2:23 PM.

## 2022-10-18 NOTE — PROGRESS NOTES
"   SUBJECTIVE:   CC: Juana is an 62 year old who presents for preventive health visit.       Patient has been advised of split billing requirements and indicates understanding: Yes  Healthy Habits:     Getting at least 3 servings of Calcium per day:  NO    Bi-annual eye exam:  Yes    Dental care twice a year:  Yes    Sleep apnea or symptoms of sleep apnea:  None    Diet:  Regular (no restrictions)    Frequency of exercise:  1 day/week    Duration of exercise:  Less than 15 minutes    Taking medications regularly:  Not Applicable    Medication side effects:  Not applicable    PHQ-2 Total Score: 0    Additional concerns today:  No      She is requesting a refill of her Tylenol with codeine.  She gets 28 for the year.  She has not had any filled yet and 2022.  She uses it for back pain, leg cramps and leg pain.  Patient is a  and does have bilateral foot pain.  She has been seen Dr. Maza recently for that she has been getting new inserts in will be going to shoe wear shoes tomorrow and is hopeful that this will be helpful.    BITE ON SKIN THAT SHE WOULD LIKE LOOKED AT, this summer she has had multiple bites in various aspects of her body that are intensely itchy become elevated, swollen and sometimes turn into a \"pus blister.\"  The scabbed over and eventually healed.  Patient is a  and does not always get these bug bites in an area of exposed skin.  She does not believe she had bedbugs.  She has googled what those look like and states these do not look similar.    She had COVID in November 2021.  She feels that she has recovered but she was quite ill.    Today's PHQ-2 Score:   PHQ-2 ( 1999 Pfizer) 10/18/2022   Q1: Little interest or pleasure in doing things 0   Q2: Feeling down, depressed or hopeless 0   PHQ-2 Score 0   PHQ-2 Total Score (12-17 Years)- Positive if 3 or more points; Administer PHQ-A if positive -   Q1: Little interest or pleasure in doing things Not at all   Q2: Feeling down, depressed " or hopeless Not at all   PHQ-2 Score 0       Abuse: Current or Past (Physical, Sexual or Emotional) - NO  Do you feel safe in your environment? Yes        Social History     Tobacco Use     Smoking status: Every Day     Packs/day: 0.75     Years: 25.00     Pack years: 18.75     Types: Cigarettes     Smokeless tobacco: Never   Substance Use Topics     Alcohol use: Yes     Comment: occasionally         Alcohol Use 10/18/2022   Prescreen: >3 drinks/day or >7 drinks/week? Yes   Prescreen: >3 drinks/day or >7 drinks/week? -   AUDIT SCORE  5       Reviewed orders with patient.  Reviewed health maintenance and updated orders accordingly - Yes  Labs reviewed in EPIC  BP Readings from Last 3 Encounters:   10/18/22 135/80   08/31/22 132/70   03/23/22 110/78    Wt Readings from Last 3 Encounters:   10/18/22 58.3 kg (128 lb 8 oz)   08/31/22 57.6 kg (127 lb)   03/23/22 56.2 kg (124 lb)                  Patient Active Problem List   Diagnosis     Tobacco use disorder     CARDIOVASCULAR SCREENING; LDL GOAL LESS THAN 130     Neck pain     History of colonic polyps     Past Surgical History:   Procedure Laterality Date     COLONOSCOPY N/A 11/6/2018    Procedure: COMBINED COLONOSCOPY with polypectomy by [facility];  Surgeon: Benja Ventura MD;  Location: [facility] OPEN SKULL EVAC HEMATOMA, SUPRATENTORIAL, SUB/ EXTRADURAL  1985       Social History     Tobacco Use     Smoking status: Every Day     Packs/day: 0.75     Years: 25.00     Pack years: 18.75     Types: Cigarettes     Smokeless tobacco: Never   Substance Use Topics     Alcohol use: Yes     Comment: occasionally     Family History   Problem Relation Age of Onset     Diabetes Mother         Type II     Neurologic Disorder Mother         Migraines     Genitourinary Problems Mother         Colitis- ulcerative     Hypertension Father      Diabetes Father         Type II     Cancer Father         lung         Current Outpatient Medications   Medication Sig Dispense Refill      acetaminophen-codeine (TYLENOL W/CODEINE #3) 300-30 MG per tablet Take 1-2 tablets by mouth every 4 hours as needed 28 tablet 0     ASPIRIN NOT PRESCRIBED (INTENTIONAL) Antiplatelet medication not prescribed intentionally due to Hx of gastrointestinal or intracranial bleed (Patient not taking: No sig reported) 1 each 0     Allergies   Allergen Reactions     Sulfa Drugs        Breast Cancer Screening:    Breast CA Risk Assessment (FHS-7) 10/12/2021   Do you have a family history of breast, colon, or ovarian cancer? No / Unknown         Mammogram Screening: Recommended mammography every 1-2 years with patient discussion and risk factor consideration  Pertinent mammograms are reviewed under the imaging tab.    History of abnormal Pap smear:   NO - age 30-65 PAP every 5 years with negative HPV co-testing recommended  Last 3 Pap and HPV Results:   PAP / HPV Latest Ref Rng & Units 10/12/2021 6/13/2016 4/16/2012   PAP   Negative for Intraepithelial Lesion or Malignancy (NILM) - -   PAP (Historical) - - NIL NIL   HPV16 Negative Negative Negative -   HPV18 Negative Negative Negative -   HRHPV Negative Negative Negative -     PAP / HPV Latest Ref Rng & Units 10/12/2021 6/13/2016 4/16/2012   PAP   Negative for Intraepithelial Lesion or Malignancy (NILM) - -   PAP (Historical) - - NIL NIL   HPV16 Negative Negative Negative -   HPV18 Negative Negative Negative -   HRHPV Negative Negative Negative -     Reviewed and updated as needed this visit by clinical staff   Tobacco  Allergies  Meds   Med Hx  Surg Hx  Fam Hx  Soc Hx        Reviewed and updated as needed this visit by Provider   Tobacco                   Review of Systems   Breasts:  Negative for tenderness, breast mass and discharge.   Genitourinary: Negative for pelvic pain, vaginal bleeding and vaginal discharge.   Musculoskeletal: Positive for arthralgias and myalgias.     As above     OBJECTIVE:   /80 (BP Location: Left arm, Patient Position: Chair, Cuff  "Size: Adult Regular)   Pulse 91   Temp 99  F (37.2  C) (Temporal)   Resp 18   Ht 1.626 m (5' 4.02\")   Wt 58.3 kg (128 lb 8 oz)   LMP 02/01/2006 (Exact Date)   SpO2 96%   Breastfeeding No   BMI 22.05 kg/m    Physical Exam  GENERAL APPEARANCE: healthy, alert and no distress  EYES: Eyes grossly normal to inspection, PERRL and conjunctivae and sclerae normal  HENT: ear canals and TM's normal, nose and mouth without ulcers or lesions, oropharynx clear and oral mucous membranes moist  NECK: no adenopathy, no asymmetry, masses, or scars and thyroid normal to palpation  RESP: lungs clear to auscultation - no rales, rhonchi or wheezes  BREAST: normal without masses, tenderness or nipple discharge and no palpable axillary masses or adenopathy  CV: regular rate and rhythm, normal S1 S2, no S3 or S4, no murmur, click or rub, no peripheral edema and peripheral pulses strong  ABDOMEN: soft, nontender, no hepatosplenomegaly, no masses and bowel sounds normal  MS: no musculoskeletal defects are noted and gait is age appropriate without ataxia  SKIN: no suspicious lesions or rashes  SKIN: No active current areas of erythema or edema, no pustules noted.  She does have some postinflammatory erythema noted on her left lower leg and back in an area where the bite occurred  NEURO: Normal strength and tone, sensory exam grossly normal, mentation intact and speech normal  PSYCH: mentation appears normal and affect normal/bright    Diagnostic Test Results:  Labs reviewed in Epic  none     ASSESSMENT/PLAN:   (Z00.00) Routine general medical examination at a health care facility  (primary encounter diagnosis)  Comment: Recommend routine annual visits  Plan: Her Pap is up-to-date and so is her colonoscopy.    (Z12.31) Visit for screening mammogram  Comment: Patient will be called to schedule  Plan: MA SCREENING DIGITAL BILAT - Future  (s+30)            (Z12.31) Encounter for screening mammogram for breast cancer  Comment: Patient will " "be called to schedule  Plan: MA SCREENING DIGITAL BILAT - Future  (s+30)            (Z23) Need for vaccination  Comment:   Plan: TDAP VACCINE (Adacel, Boostrix)        Updating    (Z13.6) CARDIOVASCULAR SCREENING; LDL GOAL LESS THAN 130  Comment:   Plan: Lipid panel reflex to direct LDL Fasting            (M54.9) Back pain, unspecified back location, unspecified back pain laterality, unspecified chronicity  Comment: Refilled her once per year prescription  Plan: acetaminophen-codeine (TYLENOL W/CODEINE #3)         300-30 MG per tablet              Patient has been advised of split billing requirements and indicates understanding: Yes    COUNSELING:  Reviewed preventive health counseling, as reflected in patient instructions    Estimated body mass index is 22.05 kg/m  as calculated from the following:    Height as of this encounter: 1.626 m (5' 4.02\").    Weight as of this encounter: 58.3 kg (128 lb 8 oz).        She reports that she has been smoking cigarettes. She has a 18.75 pack-year smoking history. She has never used smokeless tobacco.  Nicotine/Tobacco Cessation Plan:   Information offered: Patient not interested at this time    We also discussed CT screening for lung cancer which she declined.  Counseling Resources:  ATP IV Guidelines  Pooled Cohorts Equation Calculator  Breast Cancer Risk Calculator  BRCA-Related Cancer Risk Assessment: FHS-7 Tool  FRAX Risk Assessment  ICSI Preventive Guidelines  Dietary Guidelines for Americans, 2010  USDA's MyPlate  ASA Prophylaxis  Lung CA Screening    [facility] MIN  "

## 2022-11-22 ENCOUNTER — ANCILLARY PROCEDURE (OUTPATIENT)
Dept: MAMMOGRAPHY | Facility: CLINIC | Age: 62
End: 2022-11-22
Attending: PHYSICIAN ASSISTANT
Payer: COMMERCIAL

## 2022-11-22 DIAGNOSIS — Z12.31 ENCOUNTER FOR SCREENING MAMMOGRAM FOR BREAST CANCER: ICD-10-CM

## 2022-11-22 DIAGNOSIS — Z12.31 VISIT FOR SCREENING MAMMOGRAM: ICD-10-CM

## 2022-11-22 PROCEDURE — 77067 SCR MAMMO BI INCL CAD: CPT | Mod: GC | Performed by: RADIOLOGY

## 2023-07-01 ENCOUNTER — HOSPITAL ENCOUNTER (EMERGENCY)
Facility: CLINIC | Age: 63
Discharge: HOME | End: 2023-07-01
Attending: EMERGENCY MEDICINE | Admitting: EMERGENCY MEDICINE
Payer: COMMERCIAL

## 2023-07-01 VITALS
HEART RATE: 92 BPM | DIASTOLIC BLOOD PRESSURE: 108 MMHG | OXYGEN SATURATION: 97 % | SYSTOLIC BLOOD PRESSURE: 159 MMHG | RESPIRATION RATE: 16 BRPM | TEMPERATURE: 98.1 F

## 2023-07-01 DIAGNOSIS — M62.838 MUSCLE SPASM: ICD-10-CM

## 2023-07-01 DIAGNOSIS — M25.512 ACUTE PAIN OF LEFT SHOULDER: ICD-10-CM

## 2023-07-01 PROCEDURE — 96372 THER/PROPH/DIAG INJ SC/IM: CPT | Performed by: EMERGENCY MEDICINE

## 2023-07-01 PROCEDURE — 250N000011 HC RX IP 250 OP 636: Mod: JZ | Performed by: EMERGENCY MEDICINE

## 2023-07-01 PROCEDURE — 99284 EMERGENCY DEPT VISIT MOD MDM: CPT

## 2023-07-01 RX ORDER — CYCLOBENZAPRINE HCL 10 MG
10 TABLET ORAL 3 TIMES DAILY PRN
Qty: 12 TABLET | Refills: 0 | Status: SHIPPED | OUTPATIENT
Start: 2023-07-01 | End: 2023-07-07

## 2023-07-01 RX ORDER — KETOROLAC TROMETHAMINE 15 MG/ML
15 INJECTION, SOLUTION INTRAMUSCULAR; INTRAVENOUS ONCE
Status: COMPLETED | OUTPATIENT
Start: 2023-07-01 | End: 2023-07-01

## 2023-07-01 RX ORDER — IBUPROFEN 600 MG/1
600 TABLET, FILM COATED ORAL EVERY 6 HOURS PRN
Qty: 21 TABLET | Refills: 0 | Status: SHIPPED | OUTPATIENT
Start: 2023-07-01

## 2023-07-01 RX ADMIN — KETOROLAC TROMETHAMINE 15 MG: 15 INJECTION, SOLUTION INTRAMUSCULAR; INTRAVENOUS at 06:39

## 2023-07-01 NOTE — Clinical Note
Kandice Morton was seen and treated in our emergency department on 7/1/2023.  She may return to work on 07/04/2023.  As tolerated     If you have any questions or concerns, please don't hesitate to call.      Watson David, DO

## 2023-07-01 NOTE — ED NOTES
Bed: ED06  Expected date: 7/1/23  Expected time:   Means of arrival:   Comments:  Triage direct room

## 2023-07-01 NOTE — ED PROVIDER NOTES
History     Chief Complaint:  Shoulder Pain       The history is provided by the patient.      Kandice Morton is a right-handed 63 year old female who presents with left shoulder pain. Per the patient, she has had severe left shoulder pain since Monday. The pain is most severe at the back of her left shoulder. She also reports a mild headache induced by her pain because she has not slept overnight.  The patient denies chest pain, shortness of breath, or coughing up blood. She denies any recent trauma, surgery or intense use of her shoulder. She states that she saw her chiropractor and has taken Ibuprofen, Aleve and Naproxen as recently as 0200 to no relief. She also has a yearly prescription of codeine for chronic leg and back pain but prefers not to take it.  She got a massage yesterday that did help somewhat.  She denies any history of blood clots or PE's. She denies any recent travel apart from going to [place].       Independent Historian:   None - Patient Only    Review of External Notes:   [place]  shows no narcotic fills      Medications:    Aspirin  Tylenol with Codeine    Past Medical History:    Genital Herpes  Tobacco use disorder  Traumatic Subdural Hemorrhage    Past Surgical History:    Open Skull Evac Hematoma, Supratentorial, Sub/Extradural        Physical Exam     Patient Vitals for the past 24 hrs:   BP Temp Temp src Pulse Resp SpO2   07/01/23 0630 (!) 159/108 -- -- 92 -- --   07/01/23 0626 -- -- -- -- -- 97 %   07/01/23 0614 -- 98.1  F (36.7  C) Oral -- 16 --   07/01/23 0609 -- -- -- -- -- 99 %   07/01/23 0606 (!) 153/96 -- -- 97 -- --        Physical Exam  General:  Sitting on bed.  HENT:  No obvious trauma to head  Right Ear:  External ear normal.   Left Ear:  External ear normal.   Nose:  Nose normal.   Eyes:  Conjunctivae and EOM are normal. Pupils are equal, round, and reactive.   Neck: Normal range of motion. Neck supple. No tracheal deviation present.   CV:  Normal heart sounds. No  murmur heard.  Pulm/Chest: Effort normal and breath sounds normal.   M/S: Normal range of motion.  Tenderness is reproducible with palpation of the left upper posterior trapezius and upper left thoracic paraspinal musculature.  Range of motion is intact to the left shoulder.  No clavicle instability or tenderness or step-off.  No tenderness or step-off to the proximal left humerus.  No rash of shingles.  Neuro: Awake and alert.   Skin: Skin is warm and dry. No rash noted. Not diaphoretic.   Psych: Normal mood and affect. Behavior is normal.       Emergency Department Course     Emergency Department Course & Assessments:    Interventions:  Medications   ketorolac (TORADOL) injection 15 mg (15 mg Intramuscular $Given 7/1/23 0639)        Assessments:  0623 I obtained history and examined the patient as noted above.    Independent Interpretation (X-rays, CTs, rhythm strip):  None    Consultations/Discussion of Management or Tests:  None     Social Determinants of Health affecting care:   None    Disposition:  The patient was discharged to home.     Impression & Plan      Medical Decision Making:  Kandice Morton is a very pleasant 63 year old year old patient who presents to the emergency department with concern of pain to the left upper posterior shoulder.  The pain is reproducible and she has some hypertonicity to palpation of the musculature.  She denies any trauma.  She has no hypoxia, lack of lung sounds or coarse breath sounds to suggest pneumothorax or pneumonia.  No wheezing to suggest bronchospasm.  Patient's left radial pulses normal and doubt arterial insufficiency.  Range of motion to the shoulder is intact.  Her tenderness to palpation of the musculature and also the fact that she has some relief with palpation of this area leads me to believe this is very likely musculoskeletal in etiology.  I considered pneumothorax, but unlikely.  Considered pneumonia, but unlikely.  She agreed against chest x-ray  after reviewing the risk and benefit of this.  I considered aortic dissection, vertebral artery dissection, etc., but in this clinical picture this is unlikely.  I recommended continued follow-up with her PCP, outpatient physical therapy, she was provided Toradol and prescribed Flexeril and ibuprofen.  We discussed reasons to return.  She is supposed to work this weekend and requested a work note which was provided.    The treatment plan was discussed with the patient and they expressed understanding of this plan and consented to the plan.  In addition, the patient will return to the emergency department if their symptoms persist, worsen, if new symptoms arise or if there is any concern as other pathology may be present that is not evident at this time. They also understand the importance of close follow up in the clinic and if unable to do so will return to the emergency department for a reevaluation. All questions were answered.    Diagnosis:    ICD-10-CM    1. Acute pain of left shoulder  M25.512       2. Muscle spasm  M62.838            Discharge Medications:  New Prescriptions    CYCLOBENZAPRINE (FLEXERIL) 10 MG TABLET    Take 1 tablet (10 mg) by mouth 3 times daily as needed for muscle spasms    IBUPROFEN (ADVIL/MOTRIN) 600 MG TABLET    Take 1 tablet (600 mg) by mouth every 6 hours as needed for moderate pain          Scribe Disclosure:  I, Titus Lechuga, am serving as a scribe at 6:48 AM on 7/1/2023 to document services personally performed by Watson David DO based on my observations and the provider's statements to me.     7/1/2023   Watson David DO Anderson, Robert James, DO  07/01/23 0700

## 2023-07-03 ENCOUNTER — HOSPITAL ENCOUNTER (EMERGENCY)
Facility: CLINIC | Age: 63
Discharge: HOME | End: 2023-07-03
Attending: EMERGENCY MEDICINE | Admitting: EMERGENCY MEDICINE
Payer: COMMERCIAL

## 2023-07-03 ENCOUNTER — NURSE TRIAGE (OUTPATIENT)
Dept: NURSING | Facility: CLINIC | Age: 63
End: 2023-07-03
Payer: COMMERCIAL

## 2023-07-03 VITALS
RESPIRATION RATE: 16 BRPM | TEMPERATURE: 97.8 F | OXYGEN SATURATION: 98 % | HEART RATE: 82 BPM | SYSTOLIC BLOOD PRESSURE: 147 MMHG | DIASTOLIC BLOOD PRESSURE: 86 MMHG

## 2023-07-03 DIAGNOSIS — S46.812A TRAPEZIUS STRAIN, LEFT, INITIAL ENCOUNTER: ICD-10-CM

## 2023-07-03 PROCEDURE — 99283 EMERGENCY DEPT VISIT LOW MDM: CPT

## 2023-07-03 PROCEDURE — 250N000013 HC RX MED GY IP 250 OP 250 PS 637: Performed by: EMERGENCY MEDICINE

## 2023-07-03 RX ORDER — OXYCODONE HYDROCHLORIDE 5 MG/1
5 TABLET ORAL ONCE
Status: DISCONTINUED | OUTPATIENT
Start: 2023-07-03 | End: 2023-07-03

## 2023-07-03 RX ORDER — ACETAMINOPHEN AND CODEINE PHOSPHATE 300; 30 MG/1; MG/1
2 TABLET ORAL ONCE
Status: COMPLETED | OUTPATIENT
Start: 2023-07-03 | End: 2023-07-03

## 2023-07-03 RX ORDER — ACETAMINOPHEN AND CODEINE PHOSPHATE 300; 30 MG/1; MG/1
1 TABLET ORAL EVERY 6 HOURS PRN
Qty: 12 TABLET | Refills: 0 | Status: SHIPPED | OUTPATIENT
Start: 2023-07-03 | End: 2023-07-06

## 2023-07-03 RX ADMIN — ACETAMINOPHEN AND CODEINE PHOSPHATE 2 TABLET: 300; 30 TABLET ORAL at 08:28

## 2023-07-03 NOTE — ED TRIAGE NOTES
Pt c/o neck pain radiating into left shoulder and upper arm x10 days - was seen in ED recently and prescribed flexeril, but pt reports minimal relief.      Triage Assessment     Row Name 07/03/23 0702       Respiratory WDL    Respiratory WDL WDL       Cardiac WDL    Cardiac WDL WDL       Cognitive/Neuro/Behavioral WDL    Cognitive/Neuro/Behavioral WDL WDL

## 2023-07-03 NOTE — ED PROVIDER NOTES
History     Chief Complaint:  Shoulder Pain        HPI   Kandice Morton is a 63 year old female who presents with CC left-sided shoulder pain.  She reports symptoms have been ongoing for about 1 week.  Pain is localized to the very back of her left shoulder.  She was seen and evaluated in this emergency department 2 days ago and prescribed Flexeril and ibuprofen for suspected muscle spasm.  She reports incomplete pain relief and presents today due to essentially being unable to sleep last night.  She denies pain in her neck.  She denies numbness or tingling.  She denies weakness.  She has never had this problem before.  She denies any recent trauma.  She last took medication at 3 AM.      Independent Historian:   None - Patient Only    Review of External Notes:   Reviewed nurse triage note from prior to arrival.  Also reviewed ED visit from 2 days ago.      Medications:      cyclobenzaprine (FLEXERIL) 10 MG tablet  ibuprofen (ADVIL/MOTRIN) 600 MG tablet  Tylenol #3      Past Medical History:    Past Medical History:   Diagnosis Date     Genital herpes, unspecified      Subdural hemorrhage following injury, without mention of open intracranial wound, unspecified state of consciousness 1985     Tobacco use disorder      TRAUMATIC SUBDURAL HEMORRH 12/27/2006       Past Surgical History:    Past Surgical History:   Procedure Laterality Date     COLONOSCOPY N/A 11/6/2018    Procedure: COMBINED COLONOSCOPY with polypectomy by [facility];  Surgeon: Benja Ventura MD;  Location: [facility] OPEN SKULL EVAC HEMATOMA, SUPRATENTORIAL, SUB/ EXTRADURAL  1985        Physical Exam     Patient Vitals for the past 24 hrs:   BP Temp Pulse Resp SpO2   07/03/23 0702 (!) 151/78 97.8  F (36.6  C) 94 16 97 %        Physical Exam    Head:  The scalp, face, and head appear normal  Eyes:  Conjunctivae are normal  ENT:    The nose is normal    Pinnae are normal  Neck:  Trachea midline  CV:  Normal rate, regular rhythm.  Radial pulses  normal.  Resp:  No respiratory distress   Musc:  Normal muscular tone    Negative Spurling test.  Negative empty can and scapular liftoff.    Focal pain to left medial scapula with palpable muscle spasm.  Palpation of spasm reproduces her pain.  Full shoulder range of motion without pain.  Skin:  No rash or lesions noted    Sensation intact & equal in median, ulnar, and radial nerve distributions bilaterally.  Pt able to perform 'OK' sign, thumb/little finger opposition, cross finger adduction, finger abduction, 3rd finger extension, thumb flexion/extension.   Neuro: Speech is normal and fluent. Face is symmetric. Moving all extremities well.         Emergency Department Course           Emergency Department Course & Assessments:      Interventions:  Medications   acetaminophen-codeine (TYLENOL #3) 300-30 MG per tablet 2 tablet (2 tablets Oral $Given 7/3/23 4971)          Independent Interpretation (X-rays, CTs, rhythm strip):  None    Consultations/Discussion of Management or Tests:     The patient arrived in triage where vitals were measured and recorded.   The patient was then escorted back to the emergency department.   The patient's medical records were reviewed.  Nursing notes and vitals were reviewed.    I performed an exam of the patient as documented above. The patient is in agreement with my plan of care.       Social Determinants of Health affecting care:   None    Disposition:  The patient was discharged to home.     Impression & Plan        Medical Decision Making:  Patient presents with left-sided shoulder pain.  Broad differential considered including cervical radiculopathy, muscle strain/spasm, ACS/PE, fracture, dislocation, among many other etiologies.  Vital signs notable for mild hypertension.  Exam not suggestive of radiculopathy, nor rotator cuff pathology.  Patient has very focal pain at the medial border of her scapula in the distribution of her middle to lower scapula muscle.  Spasm of this  muscle is most likely etiology of her pain.  Notably, patient has visible cervical kyphosis which is most likely contributing to her symptoms.  As patient is already on NSAIDs and muscle relaxers, we will add narcotic therapy.  Patient states she can only tolerate Tylenol 3, and declines hydrocodone or oxycodone.  Limited prescription provided.  Discussed that if symptoms are not resolved in the next few days, that she will need to follow-up with primary care/orthopedics and consider physical therapy. She is in stable condition at the time of discharge, indications for return to the ED were discussed as well as follow up. All questions were answered and she is in agreement with the plan.      Diagnosis:    ICD-10-CM    1. Trapezius strain, left, initial encounter  S46.812A            Discharge Medications:  Discharge Medication List as of 7/3/2023  8:29 AM      START taking these medications    Details   !! acetaminophen-codeine (TYLENOL #3) 300-30 MG per tablet Take 1 tablet by mouth every 6 hours as needed for severe pain, Disp-12 tablet, R-0, E-Prescribe       !! - Potential duplicate medications found. Please discuss with provider.                Robin Harrison MD  07/04/23 2008

## 2023-07-03 NOTE — TELEPHONE ENCOUNTER
"    Reason for Disposition    [1] SEVERE neck pain (e.g., excruciating, unable to do any normal activities) AND [2] not improved after 2 hours of pain medicine    Additional Information    Negative: Shock suspected (e.g., cold/pale/clammy skin, too weak to stand, low BP, rapid pulse)    Negative: Difficult to awaken or acting confused (e.g., disoriented, slurred speech)    Negative: [1] Similar pain previously AND [2] it was from \"heart attack\"    Negative: [1] Similar pain previously AND [2] it was from \"angina\" AND [3] not relieved by nitroglycerin    Negative: Sounds like a life-threatening emergency to the triager    Negative: Difficulty breathing or unusual sweating (e.g., sweating without exertion)    Negative: [1] Stiff neck (can't put chin to chest) AND [2] headache    Negative: [1] Stiff neck (can't put chin to chest) AND [2] fever    Negative: Weakness of an arm or hand    Negative: Problems with bowel or bladder control    Negative: Head is twisting to one side (or ask \"is it turning against your will?\")    Negative: Patient sounds very sick or weak to the triager    Protocols used: NECK PAIN OR [id number]  Nurse Triage SBAR    Is this a 2nd Level Triage? NO    Neck and back pain. Had a Cortisone shot on  7/1/23. Took both Rx of  mg. And Flexeril 10 mg. At 11:00 PM and again early at 4:00 AM.  Pain is at 9/10.      Protocol Recommended Disposition:   See HCP Within 4 Hours (Or PCP Triage)    Recommendation: ER due to pain.  Do not drive yourself.       Kandice Burdick RN on 7/3/2023 at 6:04 AM  "

## 2023-07-03 NOTE — DISCHARGE INSTRUCTIONS
Return for uncontrollable pain, numbness/tingling, weakness, of your arm, fever > 100.4, or for any other concerns.

## 2023-07-03 NOTE — Clinical Note
"     LOS: 1 day   Primary Care Physician: Anthony Gutierrez MD     Subjective   I saw the patient prior to the MRI.  Steroids had helped the neck pain a little bit.    Vital Signs  Body mass index is 37.1 kg/m².  Temp:  [97.3 °F (36.3 °C)-97.9 °F (36.6 °C)] 97.8 °F (36.6 °C)  Heart Rate:  [42-80] 77  Resp:  [12-16] 16  BP: (126-171)/(56-74) 157/74      Objective:  General Appearance:  In no acute distress.    Vital signs: (most recent): Blood pressure 157/74, pulse 77, temperature 97.8 °F (36.6 °C), temperature source Oral, resp. rate 16, height 172.7 cm (68\"), weight 111 kg (244 lb), SpO2 91 %.    HEENT: (No JVD.  Trachea midline.  Oropharynx clear.  No thrush)    Lungs:  He is not in respiratory distress.  No stridor.  There are rales and decreased breath sounds.  No wheezes or rhonchi.  (Occasional scattered crackle)  Heart: Irregular rhythm.  Positive for murmur.    Abdomen: Abdomen is soft and non-distended.  Bowel sounds are normal.   There is no abdominal tenderness.   There is no splenomegaly. There is no hepatomegaly.   Extremities: There is no dependent edema.    Neurological: Patient is alert.    Skin:  Warm and dry.          Results Review:    I reviewed the patient's new clinical results.    Results from last 7 days   Lab Units 08/03/19  0623 08/02/19  1122   WBC 10*3/mm3 5.93 6.19   HEMOGLOBIN g/dL 10.4* 10.1*   PLATELETS 10*3/mm3 204 172     Results from last 7 days   Lab Units 08/03/19  1226 08/03/19  0623   SODIUM mmol/L 142 143   POTASSIUM mmol/L 5.1 5.3*   CHLORIDE mmol/L 105 107   CO2 mmol/L 22.8 23.2   BUN mg/dL 36* 35*   CREATININE mg/dL 1.48* 1.35*   CALCIUM mg/dL 9.9 9.6   GLUCOSE mg/dL 283* 204*         Hemoglobin A1C:  Lab Results   Component Value Date    HGBA1C 6.39 (H) 08/02/2019       Glucose Range:  Glucose   Date/Time Value Ref Range Status   08/03/2019 1616 293 (H) 70 - 130 mg/dL Final   08/03/2019 1126 286 (H) 70 - 130 mg/dL Final   08/03/2019 0648 206 (H) 70 - 130 mg/dL " Kandice Morton was seen and treated in our emergency department on 7/3/2023.  She may return to work on 07/06/2023.       If you have any questions or concerns, please don't hesitate to call.      Robin Harrison MD Final   08/02/2019 2009 202 (H) 70 - 130 mg/dL Final       Lab Results   Component Value Date    [id number] 1,662 (H) 08/03/2019       Lab Results   Component Value Date    TSH 1.190 08/03/2019       Assessment & Plan      Medication Review: Yes    Active Hospital Problems    Diagnosis  POA   • **Bradycardia [R00.1]  Yes   • Waldenstrom macroglobulinemia (CMS/HCC) [C88.0]  Unknown   • Weakness generalized [R53.1]  Yes   • CKD (chronic kidney disease) stage 3, GFR 30-59 ml/min (CMS/HCC) [N18.3]  Yes   • Anemia [D64.9]  Yes   • Fall [W19.XXXA]  Yes   • Neck pain [M54.2]  Yes   • DDD (degenerative disc disease), cervical [M50.30]  Unknown   • JULIA treated with BiPAP 20/16 [G47.33]  Yes   • Long term (current) use of opiate analgesic [Z79.891]  Not Applicable   • Hypertension [I10]  Yes   • Diabetes (CMS/HCC) [E11.9]  Yes   • Non-Hodgkin's lymphoma (CMS/HCC) [C85.90]  Yes   • Chronic pain [G89.29]  Yes      Resolved Hospital Problems   No resolved problems to display.       Assessment/Plan  1.  Symptomatic bradycardia, improved with reduction in Coreg dosage.  Echo noted.  No significant valvular disease  2.  Severe degenerative disc disease in the neck with spinal stenosis.  Neurosurgery is following-await further recommendations.  Patient currently on IV steroids.  He was in severe pain on return from MRI.  I ordered Dilaudid IV as needed.  3.  Diabetes mellitus type 2 with hyperglycemia on IV steroids.  Will increase insulin.  Continue sliding scale  4.  Chronic kidney disease stage III.  Creatinine noted-minimal change.  Recheck in am.  Baseline appears to be between 1.4 and 1.6.  Note that he is on losartan (home medicine)    Kendra Parker MD  08/03/19  4:52 PM

## 2023-07-24 ENCOUNTER — TRANSFERRED RECORDS (OUTPATIENT)
Dept: HEALTH INFORMATION MANAGEMENT | Facility: CLINIC | Age: 63
End: 2023-07-24
Payer: COMMERCIAL

## 2023-08-20 ENCOUNTER — TRANSFERRED RECORDS (OUTPATIENT)
Dept: HEALTH INFORMATION MANAGEMENT | Facility: CLINIC | Age: 63
End: 2023-08-20
Payer: COMMERCIAL

## 2023-08-21 ENCOUNTER — TELEPHONE (OUTPATIENT)
Dept: FAMILY MEDICINE | Facility: CLINIC | Age: 63
End: 2023-08-21
Payer: COMMERCIAL

## 2023-08-21 ENCOUNTER — NURSE TRIAGE (OUTPATIENT)
Dept: NURSING | Facility: CLINIC | Age: 63
End: 2023-08-21
Payer: COMMERCIAL

## 2023-08-21 DIAGNOSIS — L30.9 DERMATITIS: Primary | ICD-10-CM

## 2023-08-21 NOTE — TELEPHONE ENCOUNTER
I am happy to refer her but I am not sure how quickly she will get in.  If she cannot get in quickly, she may call her insurance to see what other derm offices are covered by her insurance and I can refer her there  Robyn Chung PA-C

## 2023-08-21 NOTE — TELEPHONE ENCOUNTER
Order/Referral Request    Who is requesting: Patient    Orders being requested: Dermatology    Reason service is needed/diagnosis: blisters that are multiplying on body,itchy    When are orders needed by: as soon as possible    Has this been discussed with Provider: No    Does patient have a preference on a Group/Provider/Facility?     Does patient have an appointment scheduled?: No    Where to send orders: Place orders within Epic    Okay to leave a detailed message?: Yes at Cell number on file:    Telephone Information:   Mobile [phone]

## 2023-08-21 NOTE — TELEPHONE ENCOUNTER
RN called and spoke with patient. RN advised of the below from provider and scheduled patient. RN recommended evaluation in UC is recommended prior to Wednesday if drainage increased, patient develops fever, or backaches/chills. RN provided patient with locations of [facility] per patient request. Patient verbalized understanding and all questions answered.     Appointments in Next Year      Aug 23, 2023 10:00 AM  (Arrive by 9:40 AM)  Provider Visit with Robyn Chung PA-C  [facility] ([facility] ) [phone]     ALEXANDRO Santana, RN  [facility] ~ Registered Nurse  Clinic Triage ~ [place] [facility]  August 21, 2023

## 2023-08-21 NOTE — TELEPHONE ENCOUNTER
"  Nurse Triage SBAR    Is this a 2nd Level Triage? YES, LICENSED PRACTITIONER REVIEW IS REQUIRED    Situation: Derm problem    Background: Patient states she gets this little blisters, red areas on her body in the past that reappear. The ones that occurring now are more spread and itchier than every before. Patient stated [facility] and was seen by a provider there and was ordered a cream. Patient does not have information on cream that was ordered other than it was like a cream/shampoo that she has to keep on for a designated timeframe and then wash off. I questioned her about if this was for possible scabies and she states that she thinks that's what the NP prescribed it for. Patient stated that it's not STI (herpe/wart) in nature because she does have a Dx of that and she knows what that is and this isn't it. Denies any linear formation of the redness or blistered areas. Patient took Benadryl at 8:30 AM with no relief. Patient also relayed that she's gotten similar areas in the past with the one time needing antibiotics that helped resolve issue.    Assessment: Patient states redness/blistering to multiple parts of the body right now. The most affected areas are genital area/inner thighs as well as her cleavage area. She also states on her legs and her shoulder blades. All the areas are itchy, but the most itchiness comes from her genital area. No pain other than \"slight\" pain from the genital area redness, but could be from her excess itching. Patient denies fever, but does state pus to the sites on her genital areas (Wore white underwear and noted the bleeding and pus there).    Protocol Recommended Disposition:   Go To [facility]    Recommendation: Will start SLT process due to needing to go to [facility]. Patient given call back instructions and phone number. Patient agrees with plan. Patient states that she can't get an appointment until 9/6/23, but states if there is any " availability she's off tomorrow and Wednesday and could possibly do Friday, but let patient know she should be seen again soon due to the pus.    Routed to provider    Does the patient meet one of the following criteria for ADS visit consideration? 16+ years old, with an [facility] PCP     TIP  Providers, please consider if this condition is appropriate for management at one of our Acute and Diagnostic Services sites.     If patient is a good candidate, please use dotphrase <dot>triageresponse and select Refer to ADS to document.      Reason for Disposition   Looks like a boil, infected sore, deep ulcer, or other infected rash (spreading redness, pus)    Additional Information   Negative: Athlete's Foot suspected (i.e., itchy rash between the toes)   Negative: Insect bite(s) suspected   Negative: Localized lump (or swelling) without redness or rash   Negative: Poison ivy, oak, or sumac contact suspected   Negative: Rash of female genital area (vulva)   Negative: Rash of male genital area (penis or scrotum)   Negative: Redness of immunization site   Negative: Shingles suspected (i.e., painful rash, multiple small blisters grouped together in one area of body; dermatomal distribution)   Negative: Small spot, skin growth, or mole   Negative: Wound infection suspected (i.e., pain, spreading redness, or pus; in a cut, puncture, scrape or sutured wound)   Negative: Fever and localized purple or blood-colored spots or dots that are not from injury or friction   Negative: Fever and localized rash is very painful   Negative: Patient sounds very sick or weak to the triager   Negative: Jock Itch suspected (i.e., itchy rash on inner thighs near genital area)    Protocols used: Rash or Redness - [id number]

## 2023-08-22 ENCOUNTER — TELEPHONE (OUTPATIENT)
Dept: DERMATOLOGY | Facility: CLINIC | Age: 63
End: 2023-08-22
Payer: COMMERCIAL

## 2023-08-22 NOTE — TELEPHONE ENCOUNTER
This encounter is being sent to inform the clinic that this patient has a referral from Robyn Chung PA-C for the diagnoses of Dermatitis and has requested that this patient be seen within Priority: 1-2 Weeks. Based on the availability of our provider(s), we are unable to accommodate this request.    Were all sites offered this patient?  Prefers [place] but, can go anywhere if can be seen today or tomorrow    Does scheduling algorithm request to schedule next available?  Patient has been scheduled for the first available opening with Prudencio Claros MD on 4/15/24.  We have informed the patient that the clinic will review their referral and reach out if a sooner appointment is medically necessary.        Per patient she is in extreme discomfort and needs an appointment today or tomorrow.     She is requesting a call back ASAP if she can't get in or if she can. May leave detailed message.

## 2023-08-22 NOTE — TELEPHONE ENCOUNTER
"Pt called and she declined soonest available appointment in September. Only wanted to be seen if it was today or tomorrow. She sees her primary tomorrow. Writer offered to still get her scheduled and she declined. Encouraged to seek urgent care if things worsen. She stated \"I will suck it up for today and see my doctor tomorrow\".     Elsa Burleson RN on 8/22/2023 at 3:10 PM    "

## 2023-08-23 ENCOUNTER — OFFICE VISIT (OUTPATIENT)
Dept: FAMILY MEDICINE | Facility: CLINIC | Age: 63
End: 2023-08-23
Payer: COMMERCIAL

## 2023-08-23 VITALS
WEIGHT: 125.4 LBS | HEART RATE: 96 BPM | SYSTOLIC BLOOD PRESSURE: 122 MMHG | RESPIRATION RATE: 17 BRPM | HEIGHT: 64 IN | OXYGEN SATURATION: 100 % | TEMPERATURE: 99 F | BODY MASS INDEX: 21.41 KG/M2 | DIASTOLIC BLOOD PRESSURE: 84 MMHG

## 2023-08-23 DIAGNOSIS — L03.311 CELLULITIS OF ABDOMINAL WALL: Primary | ICD-10-CM

## 2023-08-23 DIAGNOSIS — L98.9 SKIN LESION: ICD-10-CM

## 2023-08-23 DIAGNOSIS — L29.9 ITCHING: ICD-10-CM

## 2023-08-23 PROCEDURE — 99213 OFFICE O/P EST LOW 20 MIN: CPT | Performed by: PHYSICIAN ASSISTANT

## 2023-08-23 RX ORDER — HYDROXYZINE HYDROCHLORIDE 25 MG/1
25 TABLET, FILM COATED ORAL 3 TIMES DAILY PRN
Qty: 30 TABLET | Refills: 0 | Status: SHIPPED | OUTPATIENT
Start: 2023-08-23 | End: 2023-10-25

## 2023-08-23 RX ORDER — CEPHALEXIN 500 MG/1
500 CAPSULE ORAL 3 TIMES DAILY
Qty: 21 CAPSULE | Refills: 0 | Status: SHIPPED | OUTPATIENT
Start: 2023-08-23 | End: 2023-08-30

## 2023-08-23 ASSESSMENT — PAIN SCALES - GENERAL: PAINLEVEL: NO PAIN (0)

## 2023-08-23 NOTE — PROGRESS NOTES
Assessment & Plan     Cellulitis of abdominal wall  She will observe, if symptoms not improving in 24 hours or progressively worsening needs reassessment  - cephALEXin (KEFLEX) 500 MG capsule; Take 1 capsule (500 mg) by mouth 3 times daily for 7 days    Skin lesion  Can use hydroxyzine as needed at bedtime for itching she may take it throughout the day as well but will make her very sleepy.  She could use Zyrtec during the daytime  - hydrOXYzine (ATARAX) 25 MG tablet; Take 1 tablet (25 mg) by mouth 3 times daily as needed for itching  She has permethrin from [facility] appointment which she will use as well though I am doubtful that it is scabies based on its appearance    Itching  If the itching of the lesions is not better in 3 to 5 days, she may call for a triamcinolone prescription  - hydrOXYzine (ATARAX) 25 MG tablet; Take 1 tablet (25 mg) by mouth 3 times daily as needed for itching    This chart documentation was completed in part with Dragon voice recognition software.  Documentation is reviewed after completion, however, some words and grammatical errors may remain.  ISAC Torres PA-C  [facility] MECHELLE Arias is a 63 year old, presenting for the following health issues:  Derm Problem        8/23/2023     9:37 AM   Additional Questions   Roomed by Damaris ADKINS   Accompanied by None         8/23/2023     9:37 AM   Patient Reported Additional Medications   Patient reports taking the following new medications NA       History of Present Illness       Reason for visit:  Skin irritation  Symptom onset:  3-7 days ago  Symptom intensity:  Moderate  Symptom progression:  Staying the same  Had these symptoms before:  No    She eats 0-1 servings of fruits and vegetables daily.She consumes 0 sweetened beverage(s) daily.She exercises with enough effort to increase her heart rate 10 to 19 minutes per day.  She exercises with enough effort to increase her heart  "rate 5 days per week.   She is taking medications regularly.       Rash--patient has a history of itchy bites or lesion she gets every summer for 3 to 4 years in a row.  Typically it is only a few lesions on her legs that itch and then they go away.  She believes this current group of itchy spots is different than her typical.  Onset/Duration: On August 11 she started to have an itchy spot between her breasts that is red and sore and she developed a few on her upper body and then her back for her lower abdomen and a few more in her upper chest.  On 8/18/23 she was seen at the [facility] clinic and prescribed permethrin thinking that it might be scabies.  Patient states she does not believe that scabies and has not yet done the treatment though she did purchase it.  Description  Location: \"BRA AND UNDERWEAR LINE\"   Character: draining they use a puslike liquid, red  Itching: severe the distinct spots are very itchy  Intensity:  severe  Progression of Symptoms:  same  Accompanying signs and symptoms:   Fever: No  Body aches or joint pain: No  Sore throat symptoms: No  Recent cold symptoms: No  History:           Previous episodes of similar rash: No  New exposures:  None  Recent travel: YES, 8/7-8/11  Exposure to similar rash: No  Precipitating or alleviating factors: ITCHING   Therapies tried and outcome: hydrocortisone cream -  not effective and Benadryl/diphenhydramine -  usually effective          Review of Systems   Constitutional, HEENT, cardiovascular, pulmonary, gi and gu systems are negative, except as otherwise noted.      Objective    /84 (BP Location: Left arm, Patient Position: Chair, Cuff Size: Adult Regular)   Pulse 96   Temp 99  F (37.2  C) (Temporal)   Resp 17   Ht 1.625 m (5' 3.98\")   Wt 56.9 kg (125 lb 6.4 oz)   LMP 02/01/2006 (Exact Date)   SpO2 100%   BMI 21.54 kg/m    Body mass index is 21.54 kg/m .  Physical Exam   GENERAL: healthy, alert and no distress  NECK: no adenopathy, no " asymmetry, masses, or scars and thyroid normal to palpation  RESP: lungs clear to auscultation - no rales, rhonchi or wheezes  CV: regular rate and rhythm, normal S1 S2, no S3 or S4, no murmur, click or rub, no peripheral edema and peripheral pulses strong  MS: no gross musculoskeletal defects noted, no edema  SKIN: papule -  large, distinct individual papules that are excoriated and scabbed over they are approximately 5 to 6 mm in diameter, not actively oozing at this time.  She has 1 towards the left of her chest between her breasts a few on her upper chest 4 on her lower abdomen 1 within the pubic hair.  On her lower abdomen there is expanding erythema that is warm to the touch though not indurated the erythema is expanding to her right lower quadrant, she has similar-appearing lesions 2 on her back one beneath her bra 1 on her left upper back as well.  PSYCH: mentation appears normal, affect normal/bright

## 2023-09-13 ENCOUNTER — PATIENT OUTREACH (OUTPATIENT)
Dept: GASTROENTEROLOGY | Facility: CLINIC | Age: 63
End: 2023-09-13
Payer: COMMERCIAL

## 2023-09-13 DIAGNOSIS — Z12.11 SPECIAL SCREENING FOR MALIGNANT NEOPLASMS, COLON: Primary | ICD-10-CM

## 2023-09-13 NOTE — TELEPHONE ENCOUNTER
"Ordering/Referring Provider: Robyn Chung     BMI: Estimated body mass index is 21.54 kg/m  as calculated from the following:    Height as of 8/23/23: 1.625 m (5' 3.98\").    Weight as of 8/23/23: 56.9 kg (125 lb 6.4 oz).     Sedation:  Based on patient's medical history patient is appropriate for   moderate sedation. If patient's BMI > 50 do not schedule in ASC.    Location:  Does patient have an LVAD?  No    Does patient have a history of moderate to severe sleep apnea?  No    Does patient have a history of asthma, COPD or any other lung disease?  No    Does patient have a history of cardiac disease?  No    Is patient awaiting a heart or lung transplant?   No    Has patient had a stroke or transient ischemic attack in the last 6 months?   No    Is the patient currently on dialysis?   No    Prep:  Previous prep (last colonoscopy):   N/A    Quality of previous prep:   N/A    Is patient currently on dialysis, is ESRD, or CKD stage 4/5?   No (standard prep)    Does patient have a diagnosis of diabetes?  No    Does patient have a diagnosis of cystic fibrosis (CF)?  No    BMI > 40?  No    Final Referral Status:  meets the criteria for placement of colonoscopy screening [facility] referral order.      Referral order placed with the following recommendations:  Sedation: Moderate Sedation  Location Type: No Scheduling Restrictions  Prep: MiraLAX (No Mag Citrate)  "

## 2023-09-13 NOTE — LETTER
September 13, 2023      Kandice Morton  [address]   [facility] [address]              Christian Woodson,    We hope this message  finds you doing well.     Your health is important to us at [facility]. Our records indicate that you could be due, or possibly overdue, for a screening or surveillance colonoscopy.     An order has been placed for you to have this completed. Our scheduling team will be reaching out to you to assist in getting this scheduled. If you do not hear from them within 7 days or you would like to schedule sooner, please call [phone], option 2 for endoscopy scheduling.     If you have questions about this order or have further concerns, please reach out to your primary care provider.     Dc,     Colorectal Cancer RN Screening Team  [facility]

## 2023-10-25 ENCOUNTER — OFFICE VISIT (OUTPATIENT)
Dept: FAMILY MEDICINE | Facility: CLINIC | Age: 63
End: 2023-10-25
Payer: COMMERCIAL

## 2023-10-25 ENCOUNTER — ANCILLARY PROCEDURE (OUTPATIENT)
Dept: GENERAL RADIOLOGY | Facility: CLINIC | Age: 63
End: 2023-10-25
Attending: PHYSICIAN ASSISTANT
Payer: COMMERCIAL

## 2023-10-25 ENCOUNTER — TELEPHONE (OUTPATIENT)
Dept: FAMILY MEDICINE | Facility: CLINIC | Age: 63
End: 2023-10-25

## 2023-10-25 VITALS
BODY MASS INDEX: 21.68 KG/M2 | OXYGEN SATURATION: 98 % | HEART RATE: 88 BPM | WEIGHT: 127 LBS | DIASTOLIC BLOOD PRESSURE: 78 MMHG | HEIGHT: 64 IN | RESPIRATION RATE: 16 BRPM | TEMPERATURE: 97.9 F | SYSTOLIC BLOOD PRESSURE: 124 MMHG

## 2023-10-25 DIAGNOSIS — Z13.1 SCREENING FOR DIABETES MELLITUS: ICD-10-CM

## 2023-10-25 DIAGNOSIS — M25.571 RIGHT ANKLE PAIN, UNSPECIFIED CHRONICITY: ICD-10-CM

## 2023-10-25 DIAGNOSIS — S82.831A CLOSED FRACTURE OF DISTAL END OF RIGHT FIBULA, UNSPECIFIED FRACTURE MORPHOLOGY, INITIAL ENCOUNTER: ICD-10-CM

## 2023-10-25 DIAGNOSIS — Z12.31 ENCOUNTER FOR SCREENING MAMMOGRAM FOR BREAST CANCER: ICD-10-CM

## 2023-10-25 DIAGNOSIS — Z13.6 CARDIOVASCULAR SCREENING; LDL GOAL LESS THAN 130: ICD-10-CM

## 2023-10-25 DIAGNOSIS — R07.89 BURNING IN THE CHEST: ICD-10-CM

## 2023-10-25 DIAGNOSIS — Z13.0 SCREENING FOR DEFICIENCY ANEMIA: ICD-10-CM

## 2023-10-25 DIAGNOSIS — M54.9 BACK PAIN, UNSPECIFIED BACK LOCATION, UNSPECIFIED BACK PAIN LATERALITY, UNSPECIFIED CHRONICITY: ICD-10-CM

## 2023-10-25 DIAGNOSIS — Z00.00 ROUTINE GENERAL MEDICAL EXAMINATION AT A HEALTH CARE FACILITY: Primary | ICD-10-CM

## 2023-10-25 DIAGNOSIS — Z12.11 SPECIAL SCREENING FOR MALIGNANT NEOPLASMS, COLON: ICD-10-CM

## 2023-10-25 LAB
ANION GAP SERPL CALCULATED.3IONS-SCNC: 11 MMOL/L (ref 7–15)
BUN SERPL-MCNC: 8.1 MG/DL (ref 8–23)
CALCIUM SERPL-MCNC: 10.1 MG/DL (ref 8.8–10.2)
CHLORIDE SERPL-SCNC: 104 MMOL/L (ref 98–107)
CHOLEST SERPL-MCNC: 231 MG/DL
CREAT SERPL-MCNC: 0.73 MG/DL (ref 0.51–0.95)
DEPRECATED HCO3 PLAS-SCNC: 24 MMOL/L (ref 22–29)
EGFRCR SERPLBLD CKD-EPI 2021: >90 ML/MIN/1.73M2
ERYTHROCYTE [DISTWIDTH] IN BLOOD BY AUTOMATED COUNT: 13.7 % (ref 10–15)
GLUCOSE SERPL-MCNC: 100 MG/DL (ref 70–99)
HCT VFR BLD AUTO: 40.9 % (ref 35–47)
HDLC SERPL-MCNC: 79 MG/DL
HGB BLD-MCNC: 13.7 G/DL (ref 11.7–15.7)
LDLC SERPL CALC-MCNC: 139 MG/DL
MCH RBC QN AUTO: 31.1 PG (ref 26.5–33)
MCHC RBC AUTO-ENTMCNC: 33.5 G/DL (ref 31.5–36.5)
MCV RBC AUTO: 93 FL (ref 78–100)
NONHDLC SERPL-MCNC: 152 MG/DL
PLATELET # BLD AUTO: 385 10E3/UL (ref 150–450)
POTASSIUM SERPL-SCNC: 4.7 MMOL/L (ref 3.4–5.3)
RBC # BLD AUTO: 4.41 10E6/UL (ref 3.8–5.2)
SODIUM SERPL-SCNC: 139 MMOL/L (ref 135–145)
TRIGL SERPL-MCNC: 65 MG/DL
WBC # BLD AUTO: 8.8 10E3/UL (ref 4–11)

## 2023-10-25 PROCEDURE — 85027 COMPLETE CBC AUTOMATED: CPT | Performed by: PHYSICIAN ASSISTANT

## 2023-10-25 PROCEDURE — 73610 X-RAY EXAM OF ANKLE: CPT | Mod: TC | Performed by: RADIOLOGY

## 2023-10-25 PROCEDURE — 36415 COLL VENOUS BLD VENIPUNCTURE: CPT | Performed by: PHYSICIAN ASSISTANT

## 2023-10-25 PROCEDURE — 99396 PREV VISIT EST AGE 40-64: CPT | Mod: 25 | Performed by: PHYSICIAN ASSISTANT

## 2023-10-25 PROCEDURE — 80048 BASIC METABOLIC PNL TOTAL CA: CPT | Performed by: PHYSICIAN ASSISTANT

## 2023-10-25 PROCEDURE — 80061 LIPID PANEL: CPT | Performed by: PHYSICIAN ASSISTANT

## 2023-10-25 PROCEDURE — 99214 OFFICE O/P EST MOD 30 MIN: CPT | Mod: 25 | Performed by: PHYSICIAN ASSISTANT

## 2023-10-25 RX ORDER — ACETAMINOPHEN AND CODEINE PHOSPHATE 300; 30 MG/1; MG/1
1-2 TABLET ORAL EVERY 4 HOURS PRN
Qty: 28 TABLET | Refills: 0 | Status: SHIPPED | OUTPATIENT
Start: 2023-10-25

## 2023-10-25 ASSESSMENT — ENCOUNTER SYMPTOMS
BREAST MASS: 0
HEADACHES: 0
NERVOUS/ANXIOUS: 0
DYSURIA: 0
HEMATURIA: 0
WEAKNESS: 0
SHORTNESS OF BREATH: 0
HEARTBURN: 0
DIZZINESS: 0
EYE PAIN: 0
ARTHRALGIAS: 1
COUGH: 0
CONSTIPATION: 0
HEMATOCHEZIA: 0
PALPITATIONS: 0
DIARRHEA: 0
CHILLS: 0
PARESTHESIAS: 0
FEVER: 0
SORE THROAT: 0
JOINT SWELLING: 1
FREQUENCY: 0
ABDOMINAL PAIN: 0
MYALGIAS: 1

## 2023-10-25 NOTE — PROGRESS NOTES
SUBJECTIVE:   CC: Juana is an 63 year old who presents for preventive health visit.       10/25/2023     1:18 PM   Additional Questions   Roomed by Margo VAZQUEZ CMA   Accompanied by self         10/25/2023     1:18 PM   Patient Reported Additional Medications   Patient reports taking the following new medications n/a       Healthy Habits:     Getting at least 3 servings of Calcium per day:  NO    Bi-annual eye exam:  Yes    Dental care twice a year:  Yes    Sleep apnea or symptoms of sleep apnea:  None    Diet:  Regular (no restrictions)    Frequency of exercise:  1 day/week    Duration of exercise:  Less than 15 minutes    Taking medications regularly:  Not Applicable    Medication side effects:  Not applicable    Additional concerns today:  No    She is requesting her typical annual prescription of Tylenol with codeine.  She is a  and sometimes will get sore feet or back that is not improved with Tylenol or ibuprofen.  She gets 28 pills/year.    She did have significant left-sided neck and shoulder pain earlier this year.  She went to sports medicine physical therapy twice and nothing worked until she had a massage.  Massage healed her.  Because of this when she noticed ankle pain September 16 she opted to wait it out until this appointment.  She does not recall injuring herself.  She thinks maybe she bumped it on a barstool at her friend's house.  It was swollen and can be very tender.  Her pain is over the lateral aspect of her ankle bone.    She describes a sensation that she gets infrequently lasting about 10 minutes.  It is a substernal burning.  She is not sure if it is heartburn, she has never had heartburn before.  The pain does not radiate anywhere she is not nauseated, gassy, no belching and she is not diaphoretic.  She does not feel short of breath.  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  She was having it earlier in the visit but it had resolved  within a matter of moments.    Her right thumb at the IP joint is sometimes uncomfortable particularly if she is pulling on a sock wrong or moves it in a certain way.  She thinks it may be arthritis and she is not sure if she should worry about it or not.      Social History     Tobacco Use    Smoking status: Every Day     Packs/day: 0.50     Years: 45.00     Additional pack years: 0.00     Total pack years: 22.50     Types: Cigarettes    Smokeless tobacco: Never   Substance Use Topics    Alcohol use: Yes     Comment: occasionally             10/25/2023     1:21 PM   Alcohol Use   Prescreen: >3 drinks/day or >7 drinks/week? No         10/18/2022     1:20 PM   AUDIT - Alcohol Use Disorders Identification Test - Reproduced from the World Health Organization Audit 2001 (Second Edition)   1.  How often do you have a drink containing alcohol? 2 to 3 times a week   2.  How many drinks containing alcohol do you have on a typical day when you are drinking? 3 or 4   3.  How often do you have five or more drinks on one occasion? Less than monthly   4.  How often during the last year have you found that you were not able to stop drinking once you had started? Never   5.  How often during the last year have you failed to do what was normally expected of you because of drinking? Never   6.  How often during the last year have you needed a first drink in the morning to get yourself going after a heavy drinking session? Never   7.  How often during the last year have you had a feeling of guilt or remorse after drinking? Never   8.  How often during the last year have you been unable to remember what happened the night before because of your drinking? Never   9.  Have you or someone else been injured because of your drinking? No   10. Has a relative, friend, doctor or other health care worker been concerned about your drinking or suggested you cut down? No   TOTAL SCORE 5     Reviewed orders with patient.  Reviewed health  maintenance and updated orders accordingly - Yes  Labs reviewed in EPIC  BP Readings from Last 3 Encounters:   10/25/23 124/78   08/23/23 122/84   07/03/23 (!) 147/86    Wt Readings from Last 3 Encounters:   10/25/23 57.6 kg (127 lb)   08/23/23 56.9 kg (125 lb 6.4 oz)   10/18/22 58.3 kg (128 lb 8 oz)                  Patient Active Problem List   Diagnosis    Tobacco use disorder    CARDIOVASCULAR SCREENING; LDL GOAL LESS THAN 130    Neck pain    History of colonic polyps     Past Surgical History:   Procedure Laterality Date    COLONOSCOPY N/A 11/6/2018    Procedure: COMBINED COLONOSCOPY with polypectomy by [facility];  Surgeon: Benja Ventura MD;  Location:  GI    ZZC OPEN SKULL EVAC HEMATOMA, SUPRATENTORIAL, SUB/ EXTRADURAL  1985       Social History     Tobacco Use    Smoking status: Every Day     Packs/day: 0.50     Years: 45.00     Additional pack years: 0.00     Total pack years: 22.50     Types: Cigarettes    Smokeless tobacco: Never   Substance Use Topics    Alcohol use: Yes     Comment: occasionally     Family History   Problem Relation Age of Onset    Diabetes Mother         Type II    Neurologic Disorder Mother         Migraines    Genitourinary Problems Mother         Colitis- ulcerative    Hypertension Father     Diabetes Father         Type II    Cancer Father         lung         Current Outpatient Medications   Medication Sig Dispense Refill    acetaminophen-codeine (TYLENOL #3) 300-30 MG per tablet Take 1-2 tablets by mouth every 4 hours as needed 28 tablet 0    ASPIRIN NOT PRESCRIBED (INTENTIONAL) Antiplatelet medication not prescribed intentionally due to Hx of gastrointestinal or intracranial bleed 1 each 0    ibuprofen (ADVIL/MOTRIN) 600 MG tablet Take 1 tablet (600 mg) by mouth every 6 hours as needed for moderate pain 21 tablet 0     Allergies   Allergen Reactions    Sulfa Antibiotics        Breast Cancer Screening:        10/12/2021    10:56 AM   Breast CA Risk Assessment (FHS-7)   Do you  have a family history of breast, colon, or ovarian cancer? No / Unknown       click delete button to remove this line now  Mammogram Screening: Recommended mammography every 1-2 years with patient discussion and risk factor consideration  Pertinent mammograms are reviewed under the imaging tab.    History of abnormal Pap smear: NO - age 30-65 PAP every 5 years with negative HPV co-testing recommended  Last 3 Pap and HPV Results:       Latest Ref Rng & Units 10/12/2021    11:41 AM 6/13/2016    12:07 PM 6/13/2016    12:00 AM   PAP / HPV   PAP  Negative for Intraepithelial Lesion or Malignancy (NILM)      PAP (Historical)    NIL    HPV 16 DNA Negative Negative  Negative     HPV 18 DNA Negative Negative  Negative     Other HR HPV Negative Negative  Negative           Latest Ref Rng & Units 10/12/2021    11:41 AM 6/13/2016    12:07 PM 6/13/2016    12:00 AM   PAP / HPV   PAP  Negative for Intraepithelial Lesion or Malignancy (NILM)      PAP (Historical)    NIL    HPV 16 DNA Negative Negative  Negative     HPV 18 DNA Negative Negative  Negative     Other HR HPV Negative Negative  Negative       Reviewed and updated as needed this visit by clinical staff    Allergies  Meds   Med Hx            Reviewed and updated as needed this visit by Provider     Meds   Med Hx               Review of Systems   Constitutional:  Negative for chills and fever.   HENT:  Negative for congestion, ear pain, hearing loss and sore throat.    Eyes:  Negative for pain and visual disturbance.   Respiratory:  Negative for cough and shortness of breath.    Cardiovascular:  Positive for peripheral edema. Negative for chest pain and palpitations.   Gastrointestinal:  Negative for abdominal pain, constipation, diarrhea, heartburn and hematochezia.   Breasts:  Negative for tenderness, breast mass and discharge.   Genitourinary:  Negative for dysuria, frequency, genital sores, hematuria, pelvic pain, urgency, vaginal bleeding and vaginal discharge.  "  Musculoskeletal:  Positive for arthralgias, joint swelling and myalgias.   Skin:  Negative for rash.   Neurological:  Negative for dizziness, weakness, headaches and paresthesias.   Psychiatric/Behavioral:  Negative for mood changes. The patient is not nervous/anxious.      Ankle swelling as above    Right ankle pain, right thumb pain as above     OBJECTIVE:   /78   Pulse 88   Temp 97.9  F (36.6  C) (Temporal)   Resp 16   Ht 1.626 m (5' 4\")   Wt 57.6 kg (127 lb)   LMP 02/01/2006 (Exact Date)   SpO2 98%   BMI 21.80 kg/m    Physical Exam  GENERAL APPEARANCE: healthy, alert and no distress  EYES: Eyes grossly normal to inspection, PERRL and conjunctivae and sclerae normal  HENT: ear canals and TM's normal, nose and mouth without ulcers or lesions, oropharynx clear and oral mucous membranes moist  NECK: no adenopathy, no asymmetry, masses, or scars and thyroid normal to palpation  RESP: lungs clear to auscultation - no rales, rhonchi or wheezes  BREAST: normal without masses, tenderness or nipple discharge and no palpable axillary masses or adenopathy  CV: regular rate and rhythm, normal S1 S2, no S3 or S4, no murmur, click or rub, no peripheral edema and peripheral pulses strong  ABDOMEN: soft, nontender, no hepatosplenomegaly, no masses and bowel sounds normal  MS: She does have some slight swelling at her IP joint of her right thumb it is slightly larger than the left and no overlying redness, it does seem to snap and crack with range of motion  Right ankle range of motion is normal, she is tender to palpation over the distal fibula there is no ecchymosis or erythema, her right ankle is slightly more edematous than her left  SKIN: no suspicious lesions or rashes  NEURO: Normal strength and tone, sensory exam grossly normal, mentation intact and speech normal  PSYCH: mentation appears normal and affect normal/bright    Diagnostic Test Results:  Labs reviewed in Epic  Results for orders placed or " performed in visit on 10/25/23 (from the past 24 hour(s))   CBC with platelets   Result Value Ref Range    WBC Count 8.8 4.0 - 11.0 10e3/uL    RBC Count 4.41 3.80 - 5.20 10e6/uL    Hemoglobin 13.7 11.7 - 15.7 g/dL    Hematocrit 40.9 35.0 - 47.0 %    MCV 93 78 - 100 fL    MCH 31.1 26.5 - 33.0 pg    MCHC 33.5 31.5 - 36.5 g/dL    RDW 13.7 10.0 - 15.0 %    Platelet Count 385 150 - 450 10e3/uL     ANKLE RIGHT THREE OR MORE VIEWS  DATE/TIME: 10/25/2023 2:10 PM      INDICATION: Right ankle pain.   COMPARISON: None.                                                                      IMPRESSION:  1.  Nondisplaced transverse fracture of the right fibula distal  metadiaphysis.  2.  Normal joint spacing and alignment.  3.  Small plantar calcaneal spur.  4.  Mild soft tissue swelling in the lateral ankle.     MONICA MILES MD   ASSESSMENT/PLAN:   (Z00.00) Routine general medical examination at a health care facility  (primary encounter diagnosis)  Comment: Recommend routine annual visit  Plan: Up-to-date on her Pap smear is due for colon and mammogram    (Z12.31) Encounter for screening mammogram for breast cancer  Comment: She will be called to schedule  Plan: *MA Screening Digital Bilateral          Burning in the chest-  I recommended an EKG today and possibly a chest x-ray, she has declined.  She wants to try antacids instead if this does not help or is occurring more frequently she will make an appointment for further follow-up if she has any acute changes or worsening of her symptoms I recommend she be seen emergently.  We discussed that this could be related to her heart and emergent care may be necessary      (M25.340) Right ankle pain, unspecified chronicity  Comment:   Plan: XR Ankle Right G/E 3 Views            (S80.152D) Closed fracture of distal end of right fibula, unspecified fracture morphology, initial encounter  Comment: She had a fracture noted on x-ray, offered a cast boot which she found too uncomfortable,  she wanted a stirrup ankle brace that instead, she has been walking on this for 6 weeks though an ankle stirrup is not ideal she declined the more appropriate option  Plan: Orthopedic  Referral        We will have her see sports medicine so they can follow her improvement    (Z12.11) Special screening for malignant neoplasms, colon  Comment:   Plan: Colonoscopy Screening  Referral            (M54.9) Back pain, unspecified back location, unspecified back pain laterality, unspecified chronicity  Comment: Given her her annual prescription  Plan: acetaminophen-codeine (TYLENOL #3) 300-30 MG         per tablet            (Z13.6) CARDIOVASCULAR SCREENING; LDL GOAL LESS THAN 130  Comment:   Plan: Lipid panel reflex to direct LDL Non-fasting            (Z13.0) Screening for deficiency anemia  Comment:   Plan: CBC with platelets            (Z13.1) Screening for diabetes mellitus  Comment:   Plan: Basic metabolic panel            Patient has been advised of split billing requirements and indicates understanding: Yes      COUNSELING:  Reviewed preventive health counseling, as reflected in patient instructions        She reports that she has been smoking cigarettes. She has a 22.50 pack-year smoking history. She has never used smokeless tobacco.  Nicotine/Tobacco Cessation Plan:   Information offered: Patient not interested at this time          ISAC Torres [facility]

## 2023-10-25 NOTE — TELEPHONE ENCOUNTER
Spoke to patient about DME received in clinic  Forgot to fill out home medical purchase agreement in clinic, reviewed it over the phone and she gave verbal consent.

## 2023-10-25 NOTE — LETTER
October 25, 2023      Kandice E Cricketsean  [address]   [facility] [address]        To Whom It May Concern,   Kandice Morton was seen in the clinic today.  I am recommending massage to improve her right lower leg and ankle discomfort.          Sincerely,        Robyn Chung PA-C

## 2023-10-25 NOTE — LETTER
October 26, 2023      Juana E Praveen  [address]  [facility] [address]        Dear ,    We are writing to inform you of your test results.    Good morning, your complete blood count is normal.   Your kidney functions, liver functions and electrolytes are normal. Your blood sugar was normal since you were not technically fasting, anything under 200 is then normal.  Your cholesterol is showing an elevated LDL but normal triglycerides and HDL.  Eating a diet low in saturated fats, cholesterol, sugar, and alcohol  as well as exercising on a regular basis will help to improve these results. Your cardiovascular risk score is elevated at 7.4% anything over 5 is considered elevated risk.  Lets recheck this again fasting in 3-6 months, if it is still elevated then I highly recommend starting medications to lower your risk of heart attack and stroke.   The 10-year ASCVD risk score (Monika STEVENS, et al., 2019) is: 7.4%    Values used to calculate the score:      Age: 63 years      Sex: Female      Is Non- : No      Diabetic: No      Tobacco smoker: Yes      Systolic Blood Pressure: 124 mmHg      Is BP treated: No      HDL Cholesterol: 79 mg/dL      Total Cholesterol: 231 mg/dL  Resulted Orders   CBC with platelets   Result Value Ref Range    WBC Count 8.8 4.0 - 11.0 10e3/uL    RBC Count 4.41 3.80 - 5.20 10e6/uL    Hemoglobin 13.7 11.7 - 15.7 g/dL    Hematocrit 40.9 35.0 - 47.0 %    MCV 93 78 - 100 fL    MCH 31.1 26.5 - 33.0 pg    MCHC 33.5 31.5 - 36.5 g/dL    RDW 13.7 10.0 - 15.0 %    Platelet Count 385 150 - 450 10e3/uL   Basic metabolic panel   Result Value Ref Range    Sodium 139 135 - 145 mmol/L      Comment:      Reference intervals for this test were updated on 09/26/2023 to more accurately reflect our healthy population. There may be differences in the flagging of prior results with similar values performed with this method. Interpretation of those prior results can be made in  the context of the updated reference intervals.     Potassium 4.7 3.4 - 5.3 mmol/L    Chloride 104 98 - 107 mmol/L    Carbon Dioxide (CO2) 24 22 - 29 mmol/L    Anion Gap 11 7 - 15 mmol/L    Urea Nitrogen 8.1 8.0 - 23.0 mg/dL    Creatinine 0.73 0.51 - 0.95 mg/dL    GFR Estimate >90 >60 mL/min/1.73m2    Calcium 10.1 8.8 - 10.2 mg/dL    Glucose 100 (H) 70 - 99 mg/dL   Lipid panel reflex to direct LDL Non-fasting   Result Value Ref Range    Cholesterol 231 (H) <200 mg/dL    Triglycerides 65 <150 mg/dL    Direct Measure HDL 79 >=50 mg/dL    LDL Cholesterol Calculated 139 (H) <=100 mg/dL    Non HDL Cholesterol 152 (H) <130 mg/dL    Narrative    Cholesterol  Desirable:  <200 mg/dL    Triglycerides  Normal:  Less than 150 mg/dL  Borderline High:  150-199 mg/dL  High:  200-499 mg/dL  Very High:  Greater than or equal to 500 mg/dL    Direct Measure HDL  Female:  Greater than or equal to 50 mg/dL   Male:  Greater than or equal to 40 mg/dL    LDL Cholesterol  Desirable:  <100mg/dL  Above Desirable:  100-129 mg/dL   Borderline High:  130-159 mg/dL   High:  160-189 mg/dL   Very High:  >= 190 mg/dL    Non HDL Cholesterol  Desirable:  130 mg/dL  Above Desirable:  130-159 mg/dL  Borderline High:  160-189 mg/dL  High:  190-219 mg/dL  Very High:  Greater than or equal to 220 mg/dL       If you have any questions or concerns, please call the clinic at the number listed above.       Sincerely,      Robyn Chung PA-C

## 2023-11-06 NOTE — TELEPHONE ENCOUNTER
H&P reviewed. The patient was examined and there are no changes to the H&P.      Patient is a 65-year-old female with known CAD and features of unstable angina who presents today for left heart catheterization +/- catheter-based intervention.  Risk, benefits, and alternatives discussed with patient she wishes to proceed.  Proceed via right radial.       I can see her weds in any blocked spots but nothing tomorrow or this afternoon.  Robyn Chung PA-C

## 2023-11-13 NOTE — TELEPHONE ENCOUNTER
DIAGNOSIS: right ankle fracture doi sept 15 or 16th    APPOINTMENT DATE: 11/22/2023   NOTES STATUS DETAILS   OFFICE NOTE from referring provider Internal 10/25/2023 Dr [facility]    OFFICE NOTE from other specialist N/A    DISCHARGE SUMMARY from hospital N/A    DISCHARGE REPORT from the ER N/A    OPERATIVE REPORT N/A    EMG report N/A    MEDICATION LIST N/A    MRI N/A    DEXA (osteoporosis/bone health) N/A    CT SCAN N/A    XRAYS (IMAGES & REPORTS) Internal 10/25/2023 [facility]

## 2023-11-15 NOTE — PROGRESS NOTES
[facility]  SPORTS MEDICINE CLINIC VISIT     Nov 22, 2023        ASSESSMENT & PLAN    63-year-old roughly 2 months status post right distal fibular fracture now with clinical and radiographic healing.    Reviewed imaging and assessment with patient in detail  Continue activity as tolerated.  No further follow-up necessary.  May consider use of ankle sleeve or or brace for long stretches on her feet    Devang Rehman MD  [facility] SPORTS MEDICINE CLINIC [place]    -----  Chief Complaint   Patient presents with    Consult For     Right ankle fracture       SUBJECTIVE  Kandice Morton is a/an 63 year old female who is seen in consultation at the request of  Robyn Chung PA-C for evaluation of right fibular fracture.     The patient is seen by themselves.  The patient is Right handed    Onset: 2 month(s) ago. 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  Better with: more on it - better it feels  Treatments tried: stirrup ankle brace on 10/25/23 (declined boot) (wasn't wearing much)  Associated symptoms: no distal numbness or tingling; denies swelling or warmth    Orthopedic/Surgical history: NO  Social History/Occupation: Bartend      REVIEW OF SYSTEMS:  Do you have fever, chills, weight loss? No  Do you have any vision problems? No  Do you have any chest pain or edema? No  Do you have any shortness of breath or wheezing?  No  Do you have stomach problems? No  Do you have any numbness or focal weakness? No  Do you have diabetes? No  Do you have problems with bleeding or clotting? No  Do you have an rashes or other skin lesions? No    OBJECTIVE:  [facility] 02/01/2006 (Exact Date)      General: Alert, pleasant, no distress  Right ankle: warm, well perfused, SILT throughout     Inspection: No swelling ecchymosis or deformity     ROM: Symmetric  without discomfort     Strength: Intact without pain     Palpation: Palpable callus over the distal fibula with minimal tenderness to firm palpation.  No other tenderness about the ankle     Special Tests: None      RADIOLOGY:    Three-view x-rays of the right ankle are performed and reviewed independently demonstrating interval healing with callus formation about the nondisplaced distal fibular shaft fracture.  See EMR for full radiology report.

## 2023-11-17 DIAGNOSIS — M25.571 RIGHT ANKLE PAIN: Primary | ICD-10-CM

## 2023-11-22 ENCOUNTER — PRE VISIT (OUTPATIENT)
Dept: ORTHOPEDICS | Facility: CLINIC | Age: 63
End: 2023-11-22

## 2023-11-22 ENCOUNTER — ANCILLARY PROCEDURE (OUTPATIENT)
Dept: GENERAL RADIOLOGY | Facility: CLINIC | Age: 63
End: 2023-11-22
Attending: FAMILY MEDICINE
Payer: COMMERCIAL

## 2023-11-22 ENCOUNTER — OFFICE VISIT (OUTPATIENT)
Dept: ORTHOPEDICS | Facility: CLINIC | Age: 63
End: 2023-11-22
Payer: COMMERCIAL

## 2023-11-22 DIAGNOSIS — M25.571 ACUTE RIGHT ANKLE PAIN: Primary | ICD-10-CM

## 2023-11-22 DIAGNOSIS — M25.571 RIGHT ANKLE PAIN: ICD-10-CM

## 2023-11-22 PROCEDURE — 73610 X-RAY EXAM OF ANKLE: CPT | Mod: RT | Performed by: RADIOLOGY

## 2023-11-22 PROCEDURE — 99203 OFFICE O/P NEW LOW 30 MIN: CPT | Performed by: FAMILY MEDICINE

## 2023-11-22 NOTE — LETTER
11/22/2023         RE: Kandice Morton  [address]  [place] [place] [address]        Dear Colleague,    Thank you for referring your patient, Kandice Morton, to the [facility] SPORTS MEDICINE CLINIC [place]. Please see a copy of my visit note below.      [facility]  SPORTS MEDICINE CLINIC VISIT     Nov 22, 2023        ASSESSMENT & PLAN    63-year-old roughly 2 months status post right distal fibular fracture now with clinical and radiographic healing.    Reviewed imaging and assessment with patient in detail  Continue activity as tolerated.  No further follow-up necessary.  May consider use of ankle sleeve or or brace for long stretches on her feet    Devang Rehman MD  [facility] SPORTS MEDICINE [facility]    -----  Chief Complaint   Patient presents with     Consult For     Right ankle fracture       SUBJECTIVE  Kandice Morton is a/an 63 year old female who is seen in consultation at the request of  Robyn Chung PA-C for evaluation of right fibular fracture.     The patient is seen by themselves.  The patient is Right handed    Onset: 2 month(s) ago. 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  Better with: more on it - better it feels  Treatments tried: stirrup ankle brace on 10/25/23 (declined boot) (wasn't wearing much)  Associated symptoms: no distal numbness or tingling; denies swelling or warmth    Orthopedic/Surgical history: NO  Social History/Occupation: [employer]      REVIEW OF SYSTEMS:  Do you have fever, chills, weight loss? No  Do you have any vision problems? No  Do you have any chest pain or edema? No  Do you have any shortness of breath or wheezing?  No  Do you have stomach problems? No  Do you have any numbness or focal weakness? No  Do you have diabetes? No  Do you have problems with  bleeding or clotting? No  Do you have an rashes or other skin lesions? No    OBJECTIVE:  LMP 02/01/2006 (Exact Date)      General: Alert, pleasant, no distress  Right ankle: warm, well perfused, SILT throughout     Inspection: No swelling ecchymosis or deformity     ROM: Symmetric without discomfort     Strength: Intact without pain     Palpation: Palpable callus over the distal fibula with minimal tenderness to firm palpation.  No other tenderness about the ankle     Special Tests: None      RADIOLOGY:    Three-view x-rays of the right ankle are performed and reviewed independently demonstrating interval healing with callus formation about the nondisplaced distal fibular shaft fracture.  See EMR for full radiology report.           Again, thank you for allowing me to participate in the care of your patient.        Sincerely,        Devang Rehman MD

## 2024-01-02 ENCOUNTER — ANCILLARY PROCEDURE (OUTPATIENT)
Dept: MAMMOGRAPHY | Facility: CLINIC | Age: 64
End: 2024-01-02
Attending: PHYSICIAN ASSISTANT
Payer: COMMERCIAL

## 2024-01-02 DIAGNOSIS — Z12.31 ENCOUNTER FOR SCREENING MAMMOGRAM FOR BREAST CANCER: ICD-10-CM

## 2024-01-02 PROCEDURE — 77067 SCR MAMMO BI INCL CAD: CPT | Mod: GC | Performed by: RADIOLOGY

## 2024-01-03 ENCOUNTER — HOSPITAL ENCOUNTER (EMERGENCY)
Facility: CLINIC | Age: 64
Discharge: HOME | End: 2024-01-03
Attending: SOCIAL WORKER | Admitting: SOCIAL WORKER
Payer: COMMERCIAL

## 2024-01-03 ENCOUNTER — APPOINTMENT (OUTPATIENT)
Dept: GENERAL RADIOLOGY | Facility: CLINIC | Age: 64
End: 2024-01-03
Attending: SOCIAL WORKER
Payer: COMMERCIAL

## 2024-01-03 VITALS
TEMPERATURE: 98 F | DIASTOLIC BLOOD PRESSURE: 85 MMHG | WEIGHT: 125 LBS | HEIGHT: 66 IN | BODY MASS INDEX: 20.09 KG/M2 | OXYGEN SATURATION: 97 % | RESPIRATION RATE: 16 BRPM | HEART RATE: 95 BPM | SYSTOLIC BLOOD PRESSURE: 129 MMHG

## 2024-01-03 DIAGNOSIS — R07.89 ATYPICAL CHEST PAIN: ICD-10-CM

## 2024-01-03 LAB
ALBUMIN SERPL BCG-MCNC: 4.9 G/DL (ref 3.5–5.2)
ALP SERPL-CCNC: 101 U/L (ref 40–150)
ALT SERPL W P-5'-P-CCNC: 16 U/L (ref 0–50)
ANION GAP SERPL CALCULATED.3IONS-SCNC: 11 MMOL/L (ref 7–15)
AST SERPL W P-5'-P-CCNC: 27 U/L (ref 0–45)
ATRIAL RATE - MUSE: 118 BPM
BASOPHILS # BLD AUTO: 0.1 10E3/UL (ref 0–0.2)
BASOPHILS NFR BLD AUTO: 1 %
BILIRUB SERPL-MCNC: 1.2 MG/DL
BUN SERPL-MCNC: 9 MG/DL (ref 8–23)
CALCIUM SERPL-MCNC: 11.2 MG/DL (ref 8.8–10.2)
CHLORIDE SERPL-SCNC: 103 MMOL/L (ref 98–107)
CREAT SERPL-MCNC: 0.67 MG/DL (ref 0.51–0.95)
DEPRECATED HCO3 PLAS-SCNC: 27 MMOL/L (ref 22–29)
DIASTOLIC BLOOD PRESSURE - MUSE: NORMAL MMHG
EGFRCR SERPLBLD CKD-EPI 2021: >90 ML/MIN/1.73M2
EOSINOPHIL # BLD AUTO: 0.2 10E3/UL (ref 0–0.7)
EOSINOPHIL NFR BLD AUTO: 3 %
ERYTHROCYTE [DISTWIDTH] IN BLOOD BY AUTOMATED COUNT: 13.7 % (ref 10–15)
GLUCOSE SERPL-MCNC: 107 MG/DL (ref 70–99)
HCT VFR BLD AUTO: 43.2 % (ref 35–47)
HGB BLD-MCNC: 14.8 G/DL (ref 11.7–15.7)
IMM GRANULOCYTES # BLD: 0 10E3/UL
IMM GRANULOCYTES NFR BLD: 0 %
INTERPRETATION ECG - MUSE: NORMAL
LYMPHOCYTES # BLD AUTO: 2.6 10E3/UL (ref 0.8–5.3)
LYMPHOCYTES NFR BLD AUTO: 32 %
MCH RBC QN AUTO: 31.7 PG (ref 26.5–33)
MCHC RBC AUTO-ENTMCNC: 34.3 G/DL (ref 31.5–36.5)
MCV RBC AUTO: 93 FL (ref 78–100)
MONOCYTES # BLD AUTO: 0.6 10E3/UL (ref 0–1.3)
MONOCYTES NFR BLD AUTO: 7 %
NEUTROPHILS # BLD AUTO: 4.6 10E3/UL (ref 1.6–8.3)
NEUTROPHILS NFR BLD AUTO: 57 %
NRBC # BLD AUTO: 0 10E3/UL
NRBC BLD AUTO-RTO: 0 /100
P AXIS - MUSE: 88 DEGREES
PLATELET # BLD AUTO: 387 10E3/UL (ref 150–450)
POTASSIUM SERPL-SCNC: 4 MMOL/L (ref 3.4–5.3)
PR INTERVAL - MUSE: 136 MS
PROT SERPL-MCNC: 8.2 G/DL (ref 6.4–8.3)
QRS DURATION - MUSE: 136 MS
QT - MUSE: 352 MS
QTC - MUSE: 493 MS
R AXIS - MUSE: 105 DEGREES
RBC # BLD AUTO: 4.67 10E6/UL (ref 3.8–5.2)
SODIUM SERPL-SCNC: 141 MMOL/L (ref 135–145)
SYSTOLIC BLOOD PRESSURE - MUSE: NORMAL MMHG
T AXIS - MUSE: 57 DEGREES
TROPONIN T SERPL HS-MCNC: 7 NG/L
TROPONIN T SERPL HS-MCNC: 7 NG/L
VENTRICULAR RATE- MUSE: 118 BPM
WBC # BLD AUTO: 8.1 10E3/UL (ref 4–11)

## 2024-01-03 PROCEDURE — 84155 ASSAY OF PROTEIN SERUM: CPT | Performed by: EMERGENCY MEDICINE

## 2024-01-03 PROCEDURE — 84484 ASSAY OF TROPONIN QUANT: CPT | Performed by: SOCIAL WORKER

## 2024-01-03 PROCEDURE — 99284 EMERGENCY DEPT VISIT MOD MDM: CPT | Mod: 25

## 2024-01-03 PROCEDURE — 93005 ELECTROCARDIOGRAM TRACING: CPT

## 2024-01-03 PROCEDURE — 84484 ASSAY OF TROPONIN QUANT: CPT | Performed by: EMERGENCY MEDICINE

## 2024-01-03 PROCEDURE — 85025 COMPLETE CBC W/AUTO DIFF WBC: CPT | Performed by: SOCIAL WORKER

## 2024-01-03 PROCEDURE — 80053 COMPREHEN METABOLIC PANEL: CPT | Performed by: SOCIAL WORKER

## 2024-01-03 PROCEDURE — 36415 COLL VENOUS BLD VENIPUNCTURE: CPT | Performed by: SOCIAL WORKER

## 2024-01-03 PROCEDURE — 99285 EMERGENCY DEPT VISIT HI MDM: CPT | Mod: 25

## 2024-01-03 PROCEDURE — 82247 BILIRUBIN TOTAL: CPT | Performed by: EMERGENCY MEDICINE

## 2024-01-03 PROCEDURE — 85025 COMPLETE CBC W/AUTO DIFF WBC: CPT | Performed by: EMERGENCY MEDICINE

## 2024-01-03 PROCEDURE — 36415 COLL VENOUS BLD VENIPUNCTURE: CPT | Performed by: EMERGENCY MEDICINE

## 2024-01-03 PROCEDURE — 71046 X-RAY EXAM CHEST 2 VIEWS: CPT

## 2024-01-03 ASSESSMENT — ACTIVITIES OF DAILY LIVING (ADL)
ADLS_ACUITY_SCORE: 35

## 2024-01-03 NOTE — ED PROVIDER NOTES
"  History     Chief Complaint:  Palpitations (Started yesterday, comes on intermit. When it comes on, extends to R jaw.  No heart hx. )       HPI   Kandice Morton is a 63 year old female with hx of tobacco use who presents to the emergency department with a chief complaint of chest pain that occurred yesterday.  She states that she woke from her sleep at 7:30 in the morning yesterday with some central chest pain that radiated up into her right jaw lasted approximately 3 hours.  She then had some later again in the day lasted 15 minutes at that time which was more severe while she is waiting for her mammogram.  She denied any shortness of breath associated with it, no nausea or diaphoresis and had no correlation to exertion.  No chest pain today. She also reports that she been having some diarrhea since yesterday, however no fever no new cough or hemoptysis.  She denies any chest pain at this time.  No family history of early heart disease.  Reports that she is feeling anxious at this time and knows that her heart rate could be high.  She does not want to try any analgesia at this time.    Independent Historian:    None - Patient Only    Review of External Notes:  Reviewed office visit from 10/25/2023: \" She describes a sensation that she gets infrequently lasting about 10 minutes.  It is a substernal burning.  She is not sure if it is heartburn, she has never had heartburn before.  The pain does not radiate anywhere she is not nauseated, gassy, no belching and she is not diaphoretic.  She does not feel short of breath.  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  She was having it earlier in the visit but it had resolved within a matter of moments    I recommended an EKG today and possibly a chest x-ray, she has declined.  She wants to try antacids instead if this does not help or is occurring more frequently she will make an appointment for further follow-up if she has any " "acute changes or worsening of her symptoms I recommend she be seen emergently.  We discussed that this could be related to her heart and emergent care may be necessary .\"    Allergies:  Sulfa Antibiotics     Physical Exam   Patient Vitals for the past 24 hrs:   BP Temp Temp src Pulse Resp SpO2 Height Weight   01/03/24 1735 -- -- -- -- -- 97 % -- --   01/03/24 1733 -- -- -- -- -- 97 % -- --   01/03/24 1730 129/85 -- -- 95 -- 98 % -- --   01/03/24 1405 (!) 143/85 98  F (36.7  C) Temporal 111 16 99 % 1.676 m (5' 6\") 56.7 kg (125 lb)        Physical Exam  General: Overall stable and nontoxic appearing  HEENT: Conjunctivae clear, no scleral icterus, mucous membranes moist  Neuro: Alert, sensation and strength intact in all extremities   CV: Regular rate and rhythm, radial and DP pulses equal, no bruit on auscultation, no obvious murmurs   Respiratory: No signs of respiratory distress, lungs clear to auscultation bilaterally   Abdomen: Soft, without rigidity or rebound throughout  MSK: No lower extremity swelling or tenderness     Emergency Department Course   ECG  Electrocardiogram  ECG taken at 1355, ECG interpreted at 1615 by myself  Sinus tachycardia with a right bundle branch block, appropriately discordant ST depression as well as T wave inversions in V1-V3  Rate 118 bpm. SC interval 136. QRS duration 136. QTc 493      Laboratory: Imaging:   Labs Ordered and Resulted from Time of ED Arrival to Time of ED Departure   COMPREHENSIVE METABOLIC PANEL - Abnormal       Result Value    Sodium 141      Potassium 4.0      Carbon Dioxide (CO2) 27      Anion Gap 11      Urea Nitrogen 9.0      Creatinine 0.67      GFR Estimate >90      Calcium 11.2 (*)     Chloride 103      Glucose 107 (*)     Alkaline Phosphatase 101      AST 27      ALT 16      Protein Total 8.2      Albumin 4.9      Bilirubin Total 1.2     TROPONIN T, HIGH SENSITIVITY - Normal    Troponin T, High Sensitivity 7     TROPONIN T, HIGH SENSITIVITY - Normal    " Troponin T, High Sensitivity 7     CBC WITH PLATELETS AND DIFFERENTIAL    WBC Count 8.1      RBC Count 4.67      Hemoglobin 14.8      Hematocrit 43.2      MCV 93      MCH 31.7      MCHC 34.3      RDW 13.7      Platelet Count 387      % Neutrophils 57      % Lymphocytes 32      % Monocytes 7      % Eosinophils 3      % Basophils 1      % Immature Granulocytes 0      NRBCs per 100 WBC 0      Absolute Neutrophils 4.6      Absolute Lymphocytes 2.6      Absolute Monocytes 0.6      Absolute Eosinophils 0.2      Absolute Basophils 0.1      Absolute Immature Granulocytes 0.0      Absolute NRBCs 0.0       Chest XR,  PA & LAT   Final Result   IMPRESSION: Mild bibasilar atelectasis/scar. No pleural effusion. The cardiac silhouette and pulmonary vasculature are normal.              Procedures       Emergency Department Course & Assessments:             Interventions:  Medications - No data to display     Assessments, Independent Interpretation, Consult/Discussion of ManagementTests:  ED Course as of 24 1222      1612 Troponin T, High Sensitivity: 7   1753 CXR interpreted by myself, clear without consolidation mild L costophrenic angle blunting        Social Determinants of Health affecting care:  None    Disposition:  The patient was discharged to home.     Impression & Plan    CMS Diagnoses: None    Code Status: No Order    Medical Decision MakinyF with past medical history of tobacco use who presents to the ED with chief complaint of chest pain. On exam, patient was overall stable and nontoxic appearing. Vitals were initially notable for mild tachycardia, however patient reported she felt very anxious. CP free on presentation. Patient had clear lung sounds. Pulses were equal bilateral upper and lower extremities. CXR here did not show any signs of infiltrate and patient reported no fever or symptoms of infection, afebrile here in the ED. Troponin was Two troponins were obtained and there was no  positive delta troponin . EKG without signs of ischemia. Exam and history not consistent with pulmonary embolism or heart failure: no shortness of breath, no signs of hypervolemia. I did not think that patient's symptoms were consistent with aortic dissection given duration of symptoms and overall well appearance. Her EDACS score is 15, which falls in the low risk category however I did offer patient admission for further trending of troponins and close cardiology care given description of her symptoms; patient declined this and preferred to go home. I discussed with patient close outpatient follow up with PCP and strict return precautions. Cardiology referral placed. Patient had opportunity to ask questions, these were answered, and she was discharged in stable condition.      Diagnosis:    ICD-10-CM    1. Atypical chest pain  R07.89 Follow-Up with Cardiology           Discharge Medications:  New Prescriptions    No medications on file          1/3/2024   Hanny Guillermo MD Wu Klasek, Connie, MD  01/04/24 1231       Hanny Guillermo MD  01/04/24 1232

## 2024-01-03 NOTE — ED TRIAGE NOTES
Patient reports she started having chest pain 2 days ago and yesterday it increased suddenly and severely for about 15 minutes. Pain midline from diaphragm to upper chest. Patient tried Tums, but pain has continued into today. Earlier palpatations which have subsided.  Bad taste in mouth, jaw pain on R side (onset during one of the severe CP episodes), diarrhea.     Triage Assessment (Adult)       Row Name 01/03/24 7193          Triage Assessment    Airway WDL WDL        Respiratory WDL    Respiratory WDL WDL        Skin Circulation/Temperature WDL    Skin Circulation/Temperature WDL WDL        Cardiac WDL    Cardiac WDL X;chest pain        Peripheral/Neurovascular WDL    Peripheral Neurovascular WDL WDL        Cognitive/Neuro/Behavioral WDL    Cognitive/Neuro/Behavioral WDL WDL

## 2024-01-04 NOTE — DISCHARGE INSTRUCTIONS
You were seen in the emergency department for chest pain. Your exam was reassuring, we did not see signs of an acute emergency at this time. Your labs and imaging were also reassuring overall.     Please call your primary care provider to schedule a follow up appointment in the next few days. I have also sent a referral to the cardiology team and you should receive a call from them to set up a follow up appointment.     If you have return of chest pain and particularly if you have sweating or vomiting with the chest pain, if you have difficulty breathing, if you cough up blood, if you faint, or any other concerning symptoms, please come back to the emergency department.

## 2024-01-23 ENCOUNTER — PATIENT OUTREACH (OUTPATIENT)
Dept: GASTROENTEROLOGY | Facility: CLINIC | Age: 64
End: 2024-01-23
Payer: COMMERCIAL

## 2024-06-10 NOTE — TELEPHONE ENCOUNTER
"  ED for acute condition Discharge Protocol    \"Hi, my name is Maldonado Wyatt RN, a registered nurse, and I am calling from [facility].  I am calling to follow up and see how things are going for you after your recent emergency visit.\"    Tell me how you are doing now that you are home?\" The patient just woke up.       Discharge Instructions    \"Let's review your discharge instructions.  What is/are the follow-up recommendations?  Pt. Response: NA    \"Has an appointment with your primary care provider been scheduled?\"  No (not needed)    Medications    \"Tell me what changed about your medicines when you discharged?\"    NA    \"What questions do you have about your medications?\"   None        Call Summary    \"What questions or concerns do you have about your recent visit and your follow-up care?\"     none    \"If you have questions or things don't continue to improve, we encourage you contact us through the main clinic number (give number).  Even if the clinic is not open, triage nurses are available 24/7 to help you.     We would like you to know that our clinic has extended hours (provide information).  We also have urgent care (provide details on closest location and hours/contact info)\"    \"Thank you for your time and take care!\"    ALEXANDRO Carrasco, RN, PHN  [facility]  November 10, 2021              "
Reason for follow up call: Kandice Morton appeared on our list for being seen in and recenlty discharge from the Emergency Room/In Patient Hospital Admission.    Chief Complaint   Patient presents with     Hospital F/U     33% yellow       TCM Episode NA     Encounter routed for Clinic Triage RN to call for follow up      CHERISE CarrascoN, RN, PHN  [place]  November 10, 2021        
[place] [place] [facility]  Otolaryngology  [address] [place] [address], [address]  [place] [address]  Phone: [phone]  Fax: [phone]  Follow Up Time: 7-10 Days

## 2024-07-08 ENCOUNTER — APPOINTMENT (OUTPATIENT)
Dept: MRI IMAGING | Facility: CLINIC | Age: 64
End: 2024-07-08
Attending: BEHAVIOR TECHNICIAN
Payer: COMMERCIAL

## 2024-07-08 ENCOUNTER — HOSPITAL ENCOUNTER (EMERGENCY)
Facility: CLINIC | Age: 64
Discharge: HOME | End: 2024-07-08
Attending: EMERGENCY MEDICINE | Admitting: EMERGENCY MEDICINE
Payer: COMMERCIAL

## 2024-07-08 VITALS
WEIGHT: 125 LBS | HEIGHT: 65 IN | DIASTOLIC BLOOD PRESSURE: 85 MMHG | BODY MASS INDEX: 20.83 KG/M2 | TEMPERATURE: 97.9 F | RESPIRATION RATE: 21 BRPM | SYSTOLIC BLOOD PRESSURE: 151 MMHG | OXYGEN SATURATION: 100 % | HEART RATE: 80 BPM

## 2024-07-08 DIAGNOSIS — R21 RASH: ICD-10-CM

## 2024-07-08 DIAGNOSIS — R42 DIZZINESS: ICD-10-CM

## 2024-07-08 DIAGNOSIS — R11.0 NAUSEA: ICD-10-CM

## 2024-07-08 LAB
ALBUMIN SERPL BCG-MCNC: 3.9 G/DL (ref 3.5–5.2)
ALP SERPL-CCNC: 66 U/L (ref 40–150)
ALT SERPL W P-5'-P-CCNC: 10 U/L (ref 0–50)
ANION GAP SERPL CALCULATED.3IONS-SCNC: 11 MMOL/L (ref 7–15)
AST SERPL W P-5'-P-CCNC: 17 U/L (ref 0–45)
ATRIAL RATE - MUSE: 79 BPM
BASOPHILS # BLD AUTO: 0.1 10E3/UL (ref 0–0.2)
BASOPHILS NFR BLD AUTO: 1 %
BILIRUB SERPL-MCNC: 0.9 MG/DL
BUN SERPL-MCNC: 6.9 MG/DL (ref 8–23)
CALCIUM SERPL-MCNC: 8.6 MG/DL (ref 8.8–10.2)
CHLORIDE SERPL-SCNC: 107 MMOL/L (ref 98–107)
CREAT SERPL-MCNC: 0.58 MG/DL (ref 0.51–0.95)
DEPRECATED HCO3 PLAS-SCNC: 20 MMOL/L (ref 22–29)
DIASTOLIC BLOOD PRESSURE - MUSE: NORMAL MMHG
EGFRCR SERPLBLD CKD-EPI 2021: >90 ML/MIN/1.73M2
EOSINOPHIL # BLD AUTO: 0.2 10E3/UL (ref 0–0.7)
EOSINOPHIL NFR BLD AUTO: 3 %
ERYTHROCYTE [DISTWIDTH] IN BLOOD BY AUTOMATED COUNT: 13.2 % (ref 10–15)
ETHANOL SERPL-MCNC: <0.01 G/DL
GLUCOSE SERPL-MCNC: 103 MG/DL (ref 70–99)
HCT VFR BLD AUTO: 40 % (ref 35–47)
HGB BLD-MCNC: 14.2 G/DL (ref 11.7–15.7)
HOLD SPECIMEN: NORMAL
HOLD SPECIMEN: NORMAL
IMM GRANULOCYTES # BLD: 0 10E3/UL
IMM GRANULOCYTES NFR BLD: 0 %
INTERPRETATION ECG - MUSE: NORMAL
LYMPHOCYTES # BLD AUTO: 2.3 10E3/UL (ref 0.8–5.3)
LYMPHOCYTES NFR BLD AUTO: 27 %
MAGNESIUM SERPL-MCNC: 1.7 MG/DL (ref 1.7–2.3)
MCH RBC QN AUTO: 31.8 PG (ref 26.5–33)
MCHC RBC AUTO-ENTMCNC: 35.5 G/DL (ref 31.5–36.5)
MCV RBC AUTO: 90 FL (ref 78–100)
MONOCYTES # BLD AUTO: 0.7 10E3/UL (ref 0–1.3)
MONOCYTES NFR BLD AUTO: 8 %
NEUTROPHILS # BLD AUTO: 5.4 10E3/UL (ref 1.6–8.3)
NEUTROPHILS NFR BLD AUTO: 62 %
NRBC # BLD AUTO: 0 10E3/UL
NRBC BLD AUTO-RTO: 0 /100
P AXIS - MUSE: 75 DEGREES
PLATELET # BLD AUTO: 364 10E3/UL (ref 150–450)
POTASSIUM SERPL-SCNC: 3.5 MMOL/L (ref 3.4–5.3)
PR INTERVAL - MUSE: 164 MS
PROT SERPL-MCNC: 6.1 G/DL (ref 6.4–8.3)
QRS DURATION - MUSE: 144 MS
QT - MUSE: 438 MS
QTC - MUSE: 502 MS
R AXIS - MUSE: 75 DEGREES
RBC # BLD AUTO: 4.47 10E6/UL (ref 3.8–5.2)
SODIUM SERPL-SCNC: 138 MMOL/L (ref 135–145)
SYSTOLIC BLOOD PRESSURE - MUSE: NORMAL MMHG
T AXIS - MUSE: 53 DEGREES
TROPONIN T SERPL HS-MCNC: 6 NG/L
VENTRICULAR RATE- MUSE: 79 BPM
WBC # BLD AUTO: 8.7 10E3/UL (ref 4–11)

## 2024-07-08 PROCEDURE — 83735 ASSAY OF MAGNESIUM: CPT | Performed by: BEHAVIOR TECHNICIAN

## 2024-07-08 PROCEDURE — 99285 EMERGENCY DEPT VISIT HI MDM: CPT | Mod: 25

## 2024-07-08 PROCEDURE — 93005 ELECTROCARDIOGRAM TRACING: CPT | Mod: RTG

## 2024-07-08 PROCEDURE — 250N000013 HC RX MED GY IP 250 OP 250 PS 637: Performed by: BEHAVIOR TECHNICIAN

## 2024-07-08 PROCEDURE — A9585 GADOBUTROL INJECTION: HCPCS | Performed by: EMERGENCY MEDICINE

## 2024-07-08 PROCEDURE — 85004 AUTOMATED DIFF WBC COUNT: CPT | Performed by: EMERGENCY MEDICINE

## 2024-07-08 PROCEDURE — 82077 ASSAY SPEC XCP UR&BREATH IA: CPT | Performed by: BEHAVIOR TECHNICIAN

## 2024-07-08 PROCEDURE — 36415 COLL VENOUS BLD VENIPUNCTURE: CPT | Performed by: BEHAVIOR TECHNICIAN

## 2024-07-08 PROCEDURE — 250N000011 HC RX IP 250 OP 636: Performed by: BEHAVIOR TECHNICIAN

## 2024-07-08 PROCEDURE — 258N000003 HC RX IP 258 OP 636: Performed by: BEHAVIOR TECHNICIAN

## 2024-07-08 PROCEDURE — 255N000002 HC RX 255 OP 636: Performed by: EMERGENCY MEDICINE

## 2024-07-08 PROCEDURE — 96374 THER/PROPH/DIAG INJ IV PUSH: CPT | Mod: 59

## 2024-07-08 PROCEDURE — 84484 ASSAY OF TROPONIN QUANT: CPT | Performed by: BEHAVIOR TECHNICIAN

## 2024-07-08 PROCEDURE — 70553 MRI BRAIN STEM W/O & W/DYE: CPT

## 2024-07-08 PROCEDURE — 36415 COLL VENOUS BLD VENIPUNCTURE: CPT | Performed by: EMERGENCY MEDICINE

## 2024-07-08 PROCEDURE — 96361 HYDRATE IV INFUSION ADD-ON: CPT

## 2024-07-08 PROCEDURE — 80053 COMPREHEN METABOLIC PANEL: CPT | Performed by: EMERGENCY MEDICINE

## 2024-07-08 RX ORDER — MECLIZINE HYDROCHLORIDE 25 MG/1
25 TABLET ORAL ONCE
Status: COMPLETED | OUTPATIENT
Start: 2024-07-08 | End: 2024-07-08

## 2024-07-08 RX ORDER — ONDANSETRON 2 MG/ML
4 INJECTION INTRAMUSCULAR; INTRAVENOUS EVERY 30 MIN PRN
Status: DISCONTINUED | OUTPATIENT
Start: 2024-07-08 | End: 2024-07-08 | Stop reason: HOSPADM

## 2024-07-08 RX ORDER — GADOBUTROL 604.72 MG/ML
5 INJECTION INTRAVENOUS ONCE
Status: COMPLETED | OUTPATIENT
Start: 2024-07-08 | End: 2024-07-08

## 2024-07-08 RX ORDER — MECLIZINE HYDROCHLORIDE 25 MG/1
25 TABLET ORAL 3 TIMES DAILY PRN
Qty: 20 TABLET | Refills: 0 | Status: SHIPPED | OUTPATIENT
Start: 2024-07-08

## 2024-07-08 RX ORDER — ONDANSETRON 4 MG/1
4 TABLET, ORALLY DISINTEGRATING ORAL EVERY 6 HOURS PRN
Qty: 20 TABLET | Refills: 0 | Status: SHIPPED | OUTPATIENT
Start: 2024-07-08

## 2024-07-08 RX ADMIN — GADOBUTROL 5 ML: 604.72 INJECTION INTRAVENOUS at 18:57

## 2024-07-08 RX ADMIN — SODIUM CHLORIDE 1000 ML: 9 INJECTION, SOLUTION INTRAVENOUS at 16:57

## 2024-07-08 RX ADMIN — ONDANSETRON 4 MG: 2 INJECTION INTRAMUSCULAR; INTRAVENOUS at 17:27

## 2024-07-08 RX ADMIN — MECLIZINE HYDROCHLORIDE 25 MG: 25 TABLET ORAL at 16:56

## 2024-07-08 ASSESSMENT — ACTIVITIES OF DAILY LIVING (ADL)
ADLS_ACUITY_SCORE: 35

## 2024-07-08 NOTE — ED PROVIDER NOTES
"  Emergency Department Note      History of Present Illness     Chief Complaint   Dizziness      HPI   Kandice Morton is a 64 year old female who presents to the ED for evaluation of dizziness.  Patient states that she has had intermittent dizziness over the last week.  It has been progressively getting worse over the last 3 to 4 days.  Today, she was getting ready for work and had sudden onset dizziness and nausea.  She states that she feels like the room is spinning.  The dizziness is persistent at rest and with movement.  She denies any chest pain, headache, numbness, weakness, fever, abdominal pain, dysuria.  She has been dry heaving but no vomiting.  She states that she drinks 3 to 4 glasses of beer daily.  She has also been taking Benadryl to help with \" skin lesion\" but has stopped taking it couple days ago.  She denies history of stroke.  No dysphagia, ataxia, blurry vision, diplopia.    Independent Historian   Patient only.    Review of External Notes   Reviewed ED note from from 1/3/2024.  Seen for atypical chest pain.  Negative workup.    Reviewed office visit note from 8/23/2023.  History of itchy bites or lesions that she gets every summer for 3 to 4 years.  She has recommended hydroxyzine as needed at bedtime and Zyrtec during the day.    Past Medical History     Medical History and Problem List   Past Medical History:   Diagnosis Date    Genital herpes, unspecified     Subdural hemorrhage following injury, without mention of open intracranial wound, unspecified state of consciousness 1985    Tobacco use disorder     TRAUMATIC SUBDURAL HEMORRH 12/27/2006       Medications   meclizine (ANTIVERT) 25 MG tablet  ondansetron (ZOFRAN ODT) 4 MG ODT tab  acetaminophen-codeine (TYLENOL #3) 300-30 MG per tablet  ASPIRIN NOT PRESCRIBED (INTENTIONAL)  ibuprofen (ADVIL/MOTRIN) 600 MG tablet        Surgical History   Past Surgical History:   Procedure Laterality Date    COLONOSCOPY N/A 11/6/2018    Procedure: " "COMBINED COLONOSCOPY with polypectomy by [facility];  Surgeon: Benja Ventura MD;  Location: [facility] OPEN SKULL EVAC HEMATOMA, SUPRATENTORIAL, SUB/ EXTRADURAL  1985       Physical Exam     Patient Vitals for the past 24 hrs:   BP Temp Temp src Pulse Resp SpO2 Height Weight   07/08/24 1800 (!) 151/85 -- -- 80 21 -- -- --   07/08/24 1700 (!) 140/94 -- -- 72 14 100 % -- --   07/08/24 1611 -- 97.9  F (36.6  C) Oral -- -- -- 1.651 m (5' 5\") 56.7 kg (125 lb)   07/08/24 1605 (!) 146/79 -- -- 81 20 99 % -- --     Physical Exam  Physical Exam:  General: lying comfortably on hospital bed  Head: normocephalic, atraumatic  Eyes: PERRLA, EOMI  Ears: External ears appear normal.   Nose: no signs of bleeding   Throat: moist mucous membranes  Neck: No JVD  CV: regular rate and rhythm  Pulm: lungs clear to ausculation bilaterally, normal respiratory effort, normal chest expansion with breathing   Abdomen: soft, non-tender, non-distended  MSK: No midline tenderness  Ext: normal range of motion of all extremities. No gross deformities  Skin: see picture below.   Neuro: No gross neurologic deficits. Normal finger-to-nose-finger testing. No protanator drift. Cranial nerves II-XII grossly intact. Normal heel-to-shin testing. Normal gait. Normal sensation to light touch throughout. Muscle strength 5/5 bilaterally.   Psych: Appropriate mood. Cooperative        Diagnostics     Lab Results   Labs Ordered and Resulted from Time of ED Arrival to Time of ED Departure   COMPREHENSIVE METABOLIC PANEL - Abnormal       Result Value    Sodium 138      Potassium 3.5      Carbon Dioxide (CO2) 20 (*)     Anion Gap 11      Urea Nitrogen 6.9 (*)     Creatinine 0.58      GFR Estimate >90      Calcium 8.6 (*)     Chloride 107      Glucose 103 (*)     Alkaline Phosphatase 66      AST 17      ALT 10      Protein Total 6.1 (*)     Albumin 3.9      Bilirubin Total 0.9     TROPONIN T, HIGH SENSITIVITY - Normal    Troponin T, High Sensitivity 6     ETHYL " ALCOHOL LEVEL - Normal    Alcohol ethyl <0.01     MAGNESIUM - Normal    Magnesium 1.7     CBC WITH PLATELETS AND DIFFERENTIAL    WBC Count 8.7      RBC Count 4.47      Hemoglobin 14.2      Hematocrit 40.0      MCV 90      MCH 31.8      MCHC 35.5      RDW 13.2      Platelet Count 364      % Neutrophils 62      % Lymphocytes 27      % Monocytes 8      % Eosinophils 3      % Basophils 1      % Immature Granulocytes 0      NRBCs per 100 WBC 0      Absolute Neutrophils 5.4      Absolute Lymphocytes 2.3      Absolute Monocytes 0.7      Absolute Eosinophils 0.2      Absolute Basophils 0.1      Absolute Immature Granulocytes 0.0      Absolute NRBCs 0.0         Imaging   MR Brain w/o & w Contrast   Final Result   IMPRESSION:   1.  Mild age-related changes without acute intracranial abnormality.   2.  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          EKG   ECG results from 07/08/24   EKG 12 lead     Value    Systolic Blood Pressure     Diastolic Blood Pressure     Ventricular Rate 79    Atrial Rate 79    TN Interval 164    QRS Duration 144        QTc 502    P Axis 75    R AXIS 75    T Axis 53    Interpretation ECG      Sinus rhythm  Right bundle branch block  Abnormal ECG  When compared with ECG of 03-JAN-2024 13:55,  Vent. rate has decreased BY  39 BPM  QRS axis Shifted left           Independent Interpretation   None    ED Course      Medications Administered   Medications   sodium chloride 0.9% BOLUS 1,000 mL (0 mLs Intravenous Stopped 7/8/24 1832)   meclizine (ANTIVERT) tablet 25 mg (25 mg Oral $Given 7/8/24 1656)   gadobutrol (GADAVIST) injection 5 mL (5 mLs Intravenous $Given 7/8/24 1857)       Procedures   Procedures     Discussion of Management   None    ED Course   ED Course as of 07/09/24 1133   Mon Jul 08, 2024   1627 I evaluated patient and obtained history.       Optional/Additional Documentation  None    Medical Decision Making / Diagnosis     CMS Diagnoses: None    MIPS        None    [facility]   Kandice Morton is a 64 year old female who presents to the ED for evaluation of dizziness.  Vitals are reassuring.  Patient states that she has had room spinning dizziness for a week.  This has gotten worse in the last 3 to 4 days.  Today, she was getting ready for work and experienced sudden onset dizziness and nausea.  She denies any headache, weakness, numbness, ataxia, dysphagia.  No history of stroke.  She states that she has been taking Benadryl for her skin lesions that occur every year.  She has stopped taking it because she thought it was contributing to her symptoms.  Broad differential was considered including posterior circulation stroke, Ménière's disease, BPPV, dehydration, electrolyte abnormality.  On exam, patient is well-appearing.  Normal neurologic exam.  Labs are otherwise unremarkable.  MRI shows evidence of previous remote trauma but no acute findings.  Patient states that she has had traumatic brain injury years ago which explains the MRI findings.  Patient's symptoms improved after meclizine, Zofran, and fluids.  Will plan to discharge her home with meclizine and Zofran.    Regarding the skin lesions, it does not appear to be herpes zoster or herpes simplex virus.  Patient was seen in clinic for this before and was given a Derm referral which she did not go to.  Will plan to give another Derm referral and discussed that she should follow-up on this.  She is understandable and agreeable to plan of care.    Disposition   The patient was discharged.     Diagnosis     ICD-10-CM    1. Dizziness  R42       2. Rash  R21 Adult Dermatology  Referral      3. Nausea  R11.0            Discharge Medications   Discharge Medication List as of 7/8/2024  7:42 PM        START taking these medications    Details   meclizine (ANTIVERT) 25 MG tablet Take 1 tablet (25 mg) by mouth 3 times daily as needed for dizziness, Disp-20 tablet, R-0, E-Prescribe      ondansetron (ZOFRAN ODT) 4  MG ODT tab Take 1 tablet (4 mg) by mouth every 6 hours as needed for nausea, Disp-20 tablet, R-0, E-Prescribe               ISAC Love, ISAC Art  07/09/24 6525

## 2024-07-08 NOTE — ED NOTES
Bed: ED07  Expected date:   Expected time:   Means of arrival:   Comments:  [place] [address] 64F dizzy, n/v

## 2024-07-08 NOTE — ED PROVIDER NOTES
"ED ATTENDING PHYSICIAN NOTE:   I evaluated this patient in conjunction with Rubens Freed PA-C.  I have participated in the care of the patient and personally performed key elements of the history, exam, and medical decision making.      HPI:   Kandice Morton is a 64 year old female who presents with dizziness.  She has felt somewhat unwell for the last week, intermittently dizzy.  She thought this may be related to Benadryl she was taking for skin lesions she gets every summer so she stopped taking the Benadryl 3 to 4 days ago.  Today she had sudden onset of this dizziness that was much more severe and associated with nausea.  She has no other symptoms including blurred or double vision, numbness, or tingling.  She does use alcohol daily.    Independent Historian:   As above    Review of External Notes:  Patient has not been seen since January when she was seen in the ER. I looked at a PCP note from August 2023 when she had lesions on her back.      EXAM:   Patient Vitals for the past 24 hrs:   BP Temp Temp src Pulse Resp SpO2 Height Weight   07/08/24 1800 (!) 151/85 -- -- 80 21 -- -- --   07/08/24 1700 (!) 140/94 -- -- 72 14 100 % -- --   07/08/24 1611 -- 97.9  F (36.6  C) Oral -- -- -- 1.651 m (5' 5\") 56.7 kg (125 lb)   07/08/24 1605 (!) 146/79 -- -- 81 20 99 % -- --     General: Well-developed and well-nourished. Well appearing middle aged  woman. Cooperative.  Head:  Atraumatic.  Eyes:  Conjunctivae, lids, and sclerae are normal.  ENT:    Normal nose. Moist mucous membranes.  Neck:  Supple. Normal range of motion.  CV:  Regular rate and rhythm. Normal heart sounds with no murmurs, rubs, or gallops detected.  Resp:  No respiratory distress. Clear to auscultation bilaterally without decreased breath sounds, wheezing, rales, or rhonchi.  GI:  Soft. Non-distended. Non-tender.    MS:  Normal ROM. No bilateral lower extremity edema.  Skin:  Warm. Non-diaphoretic. No pallor.  Scabbed macular lesions " without surrounding inflammatory changes as photographed below.  No vesicles or pustules.  Neuro:  Awake. A&Ox3.     Strength 5/5 bilateral upper and lower extremities.  No pronator drift.  Sensation intact to light touch.  No facial droop. No dysarthria.  No aphasia.  Psych: Normal mood and affect. Normal speech.  Vitals reviewed.      EKG  Indication: Dizziness  Time: 1602  Rate 79 bpm. AR interval 164. QRS duration 144. QT/QTc 438/502.   Normal sinus rhythm   Right bundle branch block  Abnormal ECG  No acute ST changes.  No significant changes as compared to prior, dated 1/3/24.    Imaging  MR Brain w/o & w Contrast   Final Result   IMPRESSION:   1.  Mild age-related changes without acute intracranial abnormality.   2.  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        Laboratory  Labs Ordered and Resulted from Time of ED Arrival to Time of ED Departure   COMPREHENSIVE METABOLIC PANEL - Abnormal       Result Value    Sodium 138      Potassium 3.5      Carbon Dioxide (CO2) 20 (*)     Anion Gap 11      Urea Nitrogen 6.9 (*)     Creatinine 0.58      GFR Estimate >90      Calcium 8.6 (*)     Chloride 107      Glucose 103 (*)     Alkaline Phosphatase 66      AST 17      ALT 10      Protein Total 6.1 (*)     Albumin 3.9      Bilirubin Total 0.9     TROPONIN T, HIGH SENSITIVITY - Normal    Troponin T, High Sensitivity 6     ETHYL ALCOHOL LEVEL - Normal    Alcohol ethyl <0.01     MAGNESIUM - Normal    Magnesium 1.7     CBC WITH PLATELETS AND DIFFERENTIAL    WBC Count 8.7      RBC Count 4.47      Hemoglobin 14.2      Hematocrit 40.0      MCV 90      MCH 31.8      MCHC 35.5      RDW 13.2      Platelet Count 364      % Neutrophils 62      % Lymphocytes 27      % Monocytes 8      % Eosinophils 3      % Basophils 1      % Immature Granulocytes 0      NRBCs per 100 WBC 0      Absolute Neutrophils 5.4      Absolute Lymphocytes 2.3      Absolute Monocytes 0.7      Absolute Eosinophils 0.2       Absolute Basophils 0.1      Absolute Immature Granulocytes 0.0      Absolute NRBCs 0.0       Interventions  Medications   ondansetron (ZOFRAN) injection 4 mg (4 mg Intravenous $Given 7/8/24 1727)   sodium chloride 0.9% BOLUS 1,000 mL (0 mLs Intravenous Stopped 7/8/24 1832)   meclizine (ANTIVERT) tablet 25 mg (25 mg Oral $Given 7/8/24 1656)     Independent Interpretation (X-rays, CTs, rhythm strip):  None    Social Determinants of Health affecting care:   Established primary care provider  Employed     MEDICAL DECISION MAKING/ASSESSMENT AND PLAN:   Juana is a 64 year old woman presenting with intermittent dizziness over the last week which worsened this morning, associated with nausea.  She incidentally also has some skin lesions for which she was taking Benadryl but stopped this thinking it was maybe contributing.  She drinks alcohol daily.  She has no other symptoms and is well-appearing on exam outside of her rash/skin lesions.  She has no neurologic deficits.    EKG is reassuring without acute ST changes or arrhythmias and laboratory studies are unremarkable.  She has no acute pathology on MRI of the brain to suggest etiology of her dizziness.  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  She felt improved with meclizine, Zofran, and IV fluids and is appropriate for discharge.  We will discharge her with meclizine and Zofran to use should she have recurrence of symptoms at home.  I will also refer her to dermatology for her skin lesions which do not appear to be of a life-threatening etiology but are bothersome to the patient and seem to occur every summer.  She was invited to return should she have recurrence of uncontrolled symptoms or any new concerns.  All questions answered.     DIAGNOSIS:     ICD-10-CM    1. Dizziness  R42       2. Rash  R21 Adult Dermatology  Referral      3. Nausea  R11.0         DISPOSITION:   The patient was discharged.      Julissa  Disclosure:  I, [place] Figueroa, am serving as a scribe at 4:14 PM on 7/8/2024 to document services personally performed by Aurora Gaxiola MD based on my observations and the provider's statements to me.     7/8/2024  [facility] EMERGENCY DEPT       Aurora Gaxiola MD  07/18/24 1001

## 2024-07-08 NOTE — ED TRIAGE NOTES
"Pt BIBA from work where she is a  for sudden onset of nausea and dizziness. Pt reports dry-heaving but not throwing up. Hx of etoh use about 3-6 beers per day last drink last night, no hx of withdrawal. Pt reports having some lesions that appear on her back in the summer which she was taking benedryl for. Pt stopped taking the benedryl four days ago and reported taking \"2 at a time twice a day.\" Dizziness continues with sitting still and is getting worse.        "

## 2024-07-09 NOTE — DISCHARGE INSTRUCTIONS
You can take Zofran every 6 hours as needed for nausea.  You can take meclizine 3 times daily as needed for dizziness.  Please follow-up with your primary care provider for reassessment.  Please also follow-up with dermatology regarding skin rash/lesions.  A referral was placed for you.  Please return to the ER with worsening dizziness, persistent nausea vomiting, headache, any other concerning symptoms.

## 2024-08-14 ENCOUNTER — ANCILLARY PROCEDURE (OUTPATIENT)
Dept: GENERAL RADIOLOGY | Facility: CLINIC | Age: 64
End: 2024-08-14
Attending: PHYSICIAN ASSISTANT
Payer: COMMERCIAL

## 2024-08-14 ENCOUNTER — OFFICE VISIT (OUTPATIENT)
Dept: FAMILY MEDICINE | Facility: CLINIC | Age: 64
End: 2024-08-14
Payer: COMMERCIAL

## 2024-08-14 VITALS
DIASTOLIC BLOOD PRESSURE: 80 MMHG | RESPIRATION RATE: 18 BRPM | SYSTOLIC BLOOD PRESSURE: 130 MMHG | HEIGHT: 65 IN | HEART RATE: 68 BPM | TEMPERATURE: 97.7 F | BODY MASS INDEX: 21.34 KG/M2 | WEIGHT: 128.06 LBS | OXYGEN SATURATION: 99 %

## 2024-08-14 DIAGNOSIS — Z12.11 SCREEN FOR COLON CANCER: ICD-10-CM

## 2024-08-14 DIAGNOSIS — S89.91XA KNEE INJURY, RIGHT, INITIAL ENCOUNTER: ICD-10-CM

## 2024-08-14 DIAGNOSIS — S89.91XA KNEE INJURY, RIGHT, INITIAL ENCOUNTER: Primary | ICD-10-CM

## 2024-08-14 PROCEDURE — 73562 X-RAY EXAM OF KNEE 3: CPT | Mod: TC | Performed by: RADIOLOGY

## 2024-08-14 PROCEDURE — 99214 OFFICE O/P EST MOD 30 MIN: CPT | Performed by: PHYSICIAN ASSISTANT

## 2024-08-14 ASSESSMENT — PAIN SCALES - GENERAL: PAINLEVEL: MILD PAIN (3)

## 2024-08-14 NOTE — PROGRESS NOTES
Assessment & Plan     Knee injury, right, initial encounter  Pain following a twisting  mechanism.   Exam is benign and nonfocal. No effusion or swelling. No ligament laxity  Has known tear in lateral meniscus - on MRI for 2018.   Possible mild sprain or exacerbation of the old meniscal injury  Thankfully have been improving  Will xray today  She has brace at home with use for comfort/support. Ice, Rest. She wants to hold on PT for now.  If instability feeling persisting in a few weeks, would pursue MRI  - XR Knee Right 3 Views; Future    Screen for colon cancer  She is due to see PCP in October and wants to talk with PCP about timing at that appt.             Follow Up: see above. Additionally patient was instructed to contact clinic for worsening symptoms, non-improvement in time frame discussed, and for questions regarding treatment plan.   For virtual visits, the patient was advised to be seen for in person evaluation if symptoms or condition are worsening or non-improvement as expected.   ISAC Ulloa   Juana is a 64 year old, presenting for the following health issues:  Knee Problem        8/14/2024     1:30 PM   Additional Questions   Roomed by VE     History of Present Illness       Reason for visit:  Right knee  Symptom onset:  1-3 days ago    She eats 0-1 servings of fruits and vegetables daily.She consumes 1 sweetened beverage(s) daily.She exercises with enough effort to increase her heart rate 9 or less minutes per day.  She exercises with enough effort to increase her heart rate 3 or less days per week.   She is taking medications regularly.         Pain History: RIGHT knee   When did you first notice your pain? Monday   Have you seen anyone else for your pain? No  How has your pain affected your ability to work?  , may cause issues at work  Where in your body do you have pain? Musculoskeletal problem/pain  Onset/Duration: 3 days-    Friend's 100lb dog ran at her .  "She crouched to avoid impact and she twisted her knee. Did not actually fall or impact the knee. Was very painful. Painful weight  bearing. General pain over whole knee. Feels like it wiggles or ashley if standing to fast. Icing, resting. Taking ibuprofen.   Is somewhat better today.   No swelling of the knee.  No redness or abrasions.   Not sure if locking or catching.     Hx of right knee problems. Saw sports med in 2018 and had a knee MRI. Lateral meniscus tear on that MRI.     Description  Location: knee - right  Joint Swelling: No  Redness: No  Pain: YES  Warmth: YES  Intensity:  3-4/10  Progression of Symptoms:  same  Accompanying signs and symptoms:   Fevers: No  Numbness/tingling/weakness: No  History  Trauma to the area: YES  Recent illness:  No  Previous similar problem: No  Previous evaluation:  No  Precipitating or alleviating factors:  Aggravating factors include: walking  Therapies tried and outcome: rest/inactivity and ice            Review of Systems  Constitutional, HEENT, cardiovascular, pulmonary, gi and gu systems are negative, except as otherwise noted.      Objective    /80 (BP Location: Left arm, Patient Position: Chair, Cuff Size: Adult Regular)   Pulse 68   Temp 97.7  F (36.5  C) (Temporal)   Resp 18   Ht 1.651 m (5' 5\")   Wt 58.1 kg (128 lb 1 oz)   LMP 02/01/2006 (Exact Date)   SpO2 99%   Breastfeeding No   BMI 21.31 kg/m    Body mass index is 21.31 kg/m .  Physical Exam   GENERAL: alert and no distress  MS: Knee: RIGHT: no skin changes, bruising. Symmetric. No obvious effusion or swelling. Non-tender to palpation at joint line. Patella nontender to exam. No popliteal fullness or tenderness. ROM full extension, flexion past 90 degrees. No laxity with ligament testing. Meniscal testing negative. Does have some cracking/popping with meniscal testing but is present on the left also. No calf pain or tenderness. Posterior tibial pulses normal, no edema, sensation intact. Gait " normal.       Results for orders placed or performed in visit on 08/14/24   XR Knee Right 3 Views     Status: None    Narrative    XR KNEE RIGHT 3 VIEWS 8/14/2024 2:16 PM     HISTORY: twisting injury few days ago, pain.; Knee injury, right,  initial encounter    COMPARISON: None.         Impression    IMPRESSION: The right knee is negative for fracture or significant  effusion.    GAYLE HERNANDEZ MD         SYSTEM ID:  [id number]           Signed Electronically by: Shantel Fulton PA-C

## 2024-08-14 NOTE — PATIENT INSTRUCTIONS
Ice 20min a few times a day    Rest, elevate, avoid the activity that aggravates    Brace is helpful for support and compression    Xray today     If you are still feeling unstable or the giving out, then would do the MRI in a few weeks.

## 2024-10-24 ENCOUNTER — PATIENT OUTREACH (OUTPATIENT)
Dept: GASTROENTEROLOGY | Facility: CLINIC | Age: 64
End: 2024-10-24
Payer: COMMERCIAL

## 2024-10-24 DIAGNOSIS — Z12.11 SPECIAL SCREENING FOR MALIGNANT NEOPLASMS, COLON: Primary | ICD-10-CM

## 2024-10-24 NOTE — PROGRESS NOTES
"CRC Screening Colonoscopy Referral Review    Patient meets the inclusion criteria for screening colonoscopy standing order.    Ordering/Referring Provider:  Robyn Chung      BMI: Estimated body mass index is 21.31 kg/m  as calculated from the following:    Height as of 8/14/24: 1.651 m (5' 5\").    Weight as of 8/14/24: 58.1 kg (128 lb 1 oz).     Sedation:  Does patient have any of the following conditions affecting sedation?  No medical conditions affecting sedation.    Previous Scopes:  Any previous recommendations or follow up needs based on previous scope?  na / No recommendations.    Medical Concerns to Postpone Order:  Does patient have any of the following medical concerns that should postpone/delay colonoscopy referral?  No medical conditions affecting colonoscopy referral.    Final Referral Details:  Based on patient's medical history patient is appropriate for referral order with moderate sedation. If patient's BMI > 50 do not schedule in ASC.  "

## 2024-10-24 NOTE — LETTER
October 24, 2024      Kandice Morton  [address]   [place] [address]              Dear Juana,      We hope this letter finds you doing well.     Your health is important to us at [facility]. Our records indicate that you could be due, or possibly overdue, for a screening or surveillance colonoscopy if you have not had a colonoscopy since 2018.     An order has been placed for you to have this completed. Our scheduling team will be reaching out to you to assist in getting this scheduled. If you do not hear from them within 7 days or you would like to schedule sooner, please call [phone], option 2 for endoscopy scheduling.     If you believe this is in error and have already had a colonoscopy done, please have your results and recommendations faxed to [phone].    If you have questions about this order or have further concerns, please reach out to your primary care provider.     Dc     Colorectal Cancer RN Screening Team  [facility]

## 2024-10-29 ENCOUNTER — OFFICE VISIT (OUTPATIENT)
Dept: FAMILY MEDICINE | Facility: CLINIC | Age: 64
End: 2024-10-29
Payer: COMMERCIAL

## 2024-10-29 VITALS
BODY MASS INDEX: 21.59 KG/M2 | DIASTOLIC BLOOD PRESSURE: 76 MMHG | HEIGHT: 64 IN | TEMPERATURE: 97.9 F | SYSTOLIC BLOOD PRESSURE: 126 MMHG | WEIGHT: 126.44 LBS | RESPIRATION RATE: 16 BRPM | OXYGEN SATURATION: 100 % | HEART RATE: 86 BPM

## 2024-10-29 DIAGNOSIS — Z12.31 ENCOUNTER FOR SCREENING MAMMOGRAM FOR BREAST CANCER: ICD-10-CM

## 2024-10-29 DIAGNOSIS — Z13.6 CARDIOVASCULAR SCREENING; LDL GOAL LESS THAN 130: ICD-10-CM

## 2024-10-29 DIAGNOSIS — Z00.00 ROUTINE GENERAL MEDICAL EXAMINATION AT A HEALTH CARE FACILITY: Primary | ICD-10-CM

## 2024-10-29 DIAGNOSIS — M54.9 BACK PAIN, UNSPECIFIED BACK LOCATION, UNSPECIFIED BACK PAIN LATERALITY, UNSPECIFIED CHRONICITY: ICD-10-CM

## 2024-10-29 PROCEDURE — 99396 PREV VISIT EST AGE 40-64: CPT | Performed by: PHYSICIAN ASSISTANT

## 2024-10-29 RX ORDER — ACETAMINOPHEN AND CODEINE PHOSPHATE 300; 30 MG/1; MG/1
1-2 TABLET ORAL EVERY 4 HOURS PRN
Qty: 28 TABLET | Refills: 0 | Status: SHIPPED | OUTPATIENT
Start: 2024-10-29

## 2024-10-29 SDOH — HEALTH STABILITY: PHYSICAL HEALTH: ON AVERAGE, HOW MANY MINUTES DO YOU ENGAGE IN EXERCISE AT THIS LEVEL?: PATIENT DECLINED

## 2024-10-29 SDOH — HEALTH STABILITY: PHYSICAL HEALTH: ON AVERAGE, HOW MANY DAYS PER WEEK DO YOU ENGAGE IN MODERATE TO STRENUOUS EXERCISE (LIKE A BRISK WALK)?: 3 DAYS

## 2024-10-29 ASSESSMENT — PAIN SCALES - GENERAL: PAINLEVEL_OUTOF10: NO PAIN (0)

## 2024-10-29 ASSESSMENT — SOCIAL DETERMINANTS OF HEALTH (SDOH): HOW OFTEN DO YOU GET TOGETHER WITH FRIENDS OR RELATIVES?: TWICE A WEEK

## 2024-10-29 NOTE — PATIENT INSTRUCTIONS
Patient Education   Preventive Care Advice   This is general advice given by our system to help you stay healthy. However, your care team may have specific advice just for you. Please talk to your care team about your preventive care needs.  Nutrition  Eat 5 or more servings of fruits and vegetables each day.  Try wheat bread, brown rice and whole grain pasta (instead of white bread, rice, and pasta).  Get enough calcium and vitamin D. Check the label on foods and aim for 100% of the RDA (recommended daily allowance).  Lifestyle  Exercise at least 150 minutes each week  (30 minutes a day, 5 days a week).  Do muscle strengthening activities 2 days a week. These help control your weight and prevent disease.  No smoking.  Wear sunscreen to prevent skin cancer.  Have a dental exam and cleaning every 6 months.  Yearly exams  See your health care team every year to talk about:  Any changes in your health.  Any medicines your care team has prescribed.  Preventive care, family planning, and ways to prevent chronic diseases.  Shots (vaccines)   HPV shots (up to age 26), if you've never had them before.  Hepatitis B shots (up to age 59), if you've never had them before.  COVID-19 shot: Get this shot when it's due.  Flu shot: Get a flu shot every year.  Tetanus shot: Get a tetanus shot every 10 years.  Pneumococcal, hepatitis A, and RSV shots: Ask your care team if you need these based on your risk.  Shingles shot (for age 50 and up)  General health tests  Diabetes screening:  Starting at age 35, Get screened for diabetes at least every 3 years.  If you are younger than age 35, ask your care team if you should be screened for diabetes.  Cholesterol test: At age 39, start having a cholesterol test every 5 years, or more often if advised.  Bone density scan (DEXA): At age 50, ask your care team if you should have this scan for osteoporosis (brittle bones).  Hepatitis C: Get tested at least once in your life.  STIs (sexually  transmitted infections)  Before age 24: Ask your care team if you should be screened for STIs.  After age 24: Get screened for STIs if you're at risk. You are at risk for STIs (including HIV) if:  You are sexually active with more than one person.  You don't use condoms every time.  You or a partner was diagnosed with a sexually transmitted infection.  If you are at risk for HIV, ask about PrEP medicine to prevent HIV.  Get tested for HIV at least once in your life, whether you are at risk for HIV or not.  Cancer screening tests  Cervical cancer screening: If you have a cervix, begin getting regular cervical cancer screening tests starting at age 21.  Breast cancer scan (mammogram): If you've ever had breasts, begin having regular mammograms starting at age 40. This is a scan to check for breast cancer.  Colon cancer screening: It is important to start screening for colon cancer at age 45.  Have a colonoscopy test every 10 years (or more often if you're at risk) Or, ask your provider about stool tests like a FIT test every year or Cologuard test every 3 years.  To learn more about your testing options, visit:   .  For help making a decision, visit:   https://bit.ly/hz16871.  Prostate cancer screening test: If you have a prostate, ask your care team if a prostate cancer screening test (PSA) at age 55 is right for you.  Lung cancer screening: If you are a current or former smoker ages 50 to 80, ask your care team if ongoing lung cancer screenings are right for you.  For informational purposes only. Not to replace the advice of your health care provider. Copyright   2023 [facility]. All rights reserved. Clinically reviewed by the [facility] Transitions Program. [employer] 594643 - REV 01/24.  Substance Use Disorder: Care Instructions  Overview     You can improve your life and health by stopping your use of alcohol or drugs. When you don't drink or use drugs, you may feel and sleep better. You may  get along better with your family, friends, and coworkers. There are medicines and programs that can help with substance use disorder.  How can you care for yourself at home?  Here are some ways to help you stay sober and prevent relapse.  If you have been given medicine to help keep you sober or reduce your cravings, be sure to take it exactly as prescribed.  Talk to your doctor about programs that can help you stop using drugs or drinking alcohol.  Do not keep alcohol or drugs in your home.  Plan ahead. Think about what you'll say if other people ask you to drink or use drugs. Try not to spend time with people who drink or use drugs.  Use the time and money spent on drinking or drugs to do something that's important to you.  Preventing a relapse  Have a plan to deal with relapse. Learn to recognize changes in your thinking that lead you to drink or use drugs. Get help before you start to drink or use drugs again.  Try to stay away from situations, friends, or places that may lead you to drink or use drugs.  If you feel the need to drink alcohol or use drugs again, seek help right away. Call a trusted friend or family member. Some people get support from organizations such as Narcotics Anonymous or [employer] or from treatment facilities.  If you relapse, get help as soon as you can. Some people make a plan with another person that outlines what they want that person to do for them if they relapse. The plan usually includes how to handle the relapse and who to notify in case of relapse.  Don't give up. Remember that a relapse doesn't mean that you have failed. Use the experience to learn the triggers that lead you to drink or use drugs. Then quit again. Recovery is a lifelong process. Many people have several relapses before they are able to quit for good.  Follow-up care is a key part of your treatment and safety. Be sure to make and go to all appointments, and call your doctor if you are having problems. It's  "also a good idea to know your test results and keep a list of the medicines you take.  When should you call for help?   Call 911  anytime you think you may need emergency care. For example, call if you or someone else:    Has overdosed or has withdrawal signs. Be sure to tell the emergency workers that you are or someone else is using or trying to quit using drugs. Overdose or withdrawal signs may include:  Losing consciousness.  Seizure.  Seeing or hearing things that aren't there (hallucinations).     Is thinking or talking about suicide or harming others.   Where to get help 24 hours a day, 7 days a week   If you or someone you know talks about suicide, self-harm, a mental health crisis, a substance use crisis, or any other kind of emotional distress, get help right away. You can:    Call the Suicide and Crisis Lifeline at 988.     Call 8-650-449-TALK (1-146.816.1759).     Text HOME to 805620 to access the Crisis Text Line.   Consider saving these numbers in your phone.  Go to [employer] for more information or to chat online.  Call your doctor now or seek immediate medical care if:    You are having withdrawal symptoms. These may include nausea or vomiting, sweating, shakiness, and anxiety.   Watch closely for changes in your health, and be sure to contact your doctor if:    You have a relapse.     You need more help or support to stop.   Where can you learn more?  Go to https://www.[employer].net/patiented  Enter H573 in the search box to learn more about \"Substance Use Disorder: Care Instructions.\"  Current as of: November 15, 2023  Content Version: 14.2 2024 [employer] [employer].   Care instructions adapted under license by your healthcare professional. If you have questions about a medical condition or this instruction, always ask your healthcare professional. [employer] disclaims any warranty or liability for your use of this information.       "

## 2024-10-29 NOTE — PROGRESS NOTES
Preventive Care Visit  [facility] MECHELLE Chung PA-C, Family Medicine  Oct 29, 2024      Assessment & Plan     Routine general medical examination at a health care facility  Recommend routine annual visits.  Cancer screening will be updated as below.  Pap smear is not due till 2026.  Mammogram will be due in January.  She already has orders for colonoscopy she would like to wait until she has Medicare to do the lung cancer screening.  She will contact me to order this    Back pain, unspecified back location, unspecified back pain laterality, unspecified chronicity  1 prescription per year, will continue to monitor  - acetaminophen-codeine (TYLENOL #3) 300-30 MG per tablet; Take 1-2 tablets by mouth every 4 hours as needed.    CARDIOVASCULAR SCREENING; LDL GOAL LESS THAN 130    - Lipid panel reflex to direct LDL Fasting; Future    Encounter for screening mammogram for breast cancer  She needs to schedule  - MA Screen Bilateral w/Buck; Future    Patient has been advised of split billing requirements and indicates understanding: Yes        Nicotine/Tobacco Cessation  She reports that she has been smoking cigarettes. She has a 22.5 pack-year smoking history. She has never been exposed to tobacco smoke. She has never used smokeless tobacco.  Nicotine/Tobacco Cessation Plan  Information offered: Patient not interested at this time      Counseling  Appropriate preventive services were addressed with this patient via screening, questionnaire, or discussion as appropriate for fall prevention, nutrition, physical activity, Tobacco-use cessation, social engagement, weight loss and cognition.  Checklist reviewing preventive services available has been given to the patient.  Reviewed patient's diet, addressing concerns and/or questions.   She is at risk for lack of exercise and has been provided with information to increase physical activity for the benefit of her well-being.       This chart  documentation was completed in part with Dragon voice recognition software.  Documentation is reviewed after completion, however, some words and grammatical errors may remain.  ISAC Torres   Juana is a 64 year old, presenting for the following:  Physical        10/29/2024     1:32 PM   Additional Questions   Roomed by NELLY VOGT  She is requesting her 28 pills of Tylenol with codeine that she uses intermittently throughout the year.  She uses it primarily for back pain.  She is a  and spends a lot of time on her feet.  She never asked for refill and does use it appropriately.    She will be getting Medicare in the next several months.  She would like to further investigate her lower abdominal bloating in 2022 we did do a pelvic ultrasound which was normal but she would be interested in something like a CT scan to rule out cancer.    She is a current smoker and has greater than 20-pack-year history of tobacco use.  She does not want to do any additional testing prior to going on Medicare but would be interested in lung screening at that time.  She hit her knee on a hitch earlier this year that has thankfully improved and she twisted her knee when her child's large dog jumped up on her.  That too has improved            Health Care Directive  Patient does not have a Health Care Directive:       10/29/2024   General Health   How would you rate your overall physical health? Good   Feel stress (tense, anxious, or unable to sleep) Not at all            10/29/2024   Nutrition   Three or more servings of calcium each day? (!) I DON'T KNOW   Diet: Regular (no restrictions)   How many servings of fruit and vegetables per day? (!) 0-1   How many sweetened beverages each day? 0-1            10/29/2024   Exercise   Days per week of moderate/strenous exercise 3 days   Average minutes spent exercising at this level Patient declined            10/29/2024   Social Factors   Frequency of  gathering with friends or relatives Twice a week   Worry food won't last until get money to buy more No   Food not last or not have enough money for food? No   Do you have housing? (Housing is defined as stable permanent housing and does not include staying ouside in a car, in a tent, in an abandoned building, in an overnight shelter, or couch-surfing.) Yes   Are you worried about losing your housing? No   Lack of transportation? No   Unable to get utilities (heat,electricity)? No            10/29/2024   Fall Risk   Fallen 2 or more times in the past year? No    Trouble with walking or balance? No        Patient-reported          10/29/2024   Dental   Dentist two times every year? Yes            10/29/2024   TB Screening   Were you born outside of the [place]? No                Today's PHQ-2 Score:       8/14/2024     1:21 PM   PHQ-2 ( 1999 [employer])   Q1: Little interest or pleasure in doing things 0    Q2: Feeling down, depressed or hopeless 0    PHQ-2 Score 0   Q1: Little interest or pleasure in doing things Not at all   Q2: Feeling down, depressed or hopeless Not at all   PHQ-2 Score 0       Patient-reported           10/29/2024   Substance Use   If I could quit smoking, I would Neutral   I want to quit somking, worry about health affects Neutral   Willing to make a plan to quit smoking Neutral   Willing to cut down before quitting Somewhat agree   Alcohol more than 3/day or more than 7/wk No   Do you use any other substances recreationally? (!) CANNABIS PRODUCTS        Social History     Tobacco Use    Smoking status: Every Day     Current packs/day: 0.50     Average packs/day: 0.5 packs/day for 45.0 years (22.5 ttl pk-yrs)     Types: Cigarettes     Passive exposure: Never    Smokeless tobacco: Never   Vaping Use    Vaping status: Never Used   Substance Use Topics    Alcohol use: Yes     Comment: occasionally    Drug use: No           1/2/2024   LAST FHS-7 RESULTS   1st degree relative breast or ovarian cancer No    Any relative bilateral breast cancer No   Any male have breast cancer No   Any ONE woman have BOTH breast AND ovarian cancer No   Any woman with breast cancer before 50yrs No   2 or more relatives with breast AND/OR ovarian cancer No   2 or more relatives with breast AND/OR bowel cancer No             Mammogram Screening - Mammogram every 1-2 years updated in Health Maintenance based on mutual decision making        10/29/2024   STI Screening   New sexual partner(s) since last STI/HIV test? No        History of abnormal Pap smear: No - age 30- 64 PAP with HPV every 5 years recommended        Latest Ref Rng & Units 10/12/2021    11:41 AM 6/13/2016    12:07 PM 6/13/2016    12:00 AM   PAP / HPV   PAP  Negative for Intraepithelial Lesion or Malignancy (NILM)      PAP (Historical)    NIL    HPV 16 DNA Negative Negative  Negative     HPV 18 DNA Negative Negative  Negative     Other HR HPV Negative Negative  Negative       ASCVD Risk   The 10-year ASCVD risk score (Monika STEVENS, et al., 2019) is: 8.4%    Values used to calculate the score:      Age: 64 years      Sex: Female      Is Non- : No      Diabetic: No      Tobacco smoker: Yes      Systolic Blood Pressure: 126 mmHg      Is BP treated: No      HDL Cholesterol: 79 mg/dL      Total Cholesterol: 231 mg/dL           Reviewed and updated as needed this visit by Provider   Tobacco   Meds                Lab work is in process  Labs reviewed in EPIC  BP Readings from Last 3 Encounters:   10/29/24 126/76   08/14/24 130/80   07/08/24 (!) 151/85    Wt Readings from Last 3 Encounters:   10/29/24 57.4 kg (126 lb 7 oz)   08/14/24 58.1 kg (128 lb 1 oz)   07/08/24 56.7 kg (125 lb)                  Patient Active Problem List   Diagnosis    Tobacco use disorder    CARDIOVASCULAR SCREENING; LDL GOAL LESS THAN 130    Neck pain    History of colonic polyps     Past Surgical History:   Procedure Laterality Date    COLONOSCOPY N/A 11/6/2018    Procedure:  "COMBINED COLONOSCOPY with polypectomy by [facility];  Surgeon: Benja Ventuar MD;  Location: [facility]    [facility] OPEN SKULL EVAC HEMATOMA, SUPRATENTORIAL, SUB/ EXTRADURAL  1985       Social History     Tobacco Use    Smoking status: Every Day     Current packs/day: 0.50     Average packs/day: 0.5 packs/day for 45.0 years (22.5 ttl pk-yrs)     Types: Cigarettes     Passive exposure: Never    Smokeless tobacco: Never   Substance Use Topics    Alcohol use: Yes     Comment: occasionally     Family History   Problem Relation Age of Onset    Diabetes Mother         Type II    Neurologic Disorder Mother         Migraines    Genitourinary Problems Mother         Colitis- ulcerative    Hypertension Father     Diabetes Father         Type II    Cancer Father         lung         Current Outpatient Medications   Medication Sig Dispense Refill    acetaminophen-codeine (TYLENOL #3) 300-30 MG per tablet Take 1-2 tablets by mouth every 4 hours as needed. 28 tablet 0    ASPIRIN NOT PRESCRIBED (INTENTIONAL) Antiplatelet medication not prescribed intentionally due to Hx of gastrointestinal or intracranial bleed 1 each 0    ibuprofen (ADVIL/MOTRIN) 600 MG tablet Take 1 tablet (600 mg) by mouth every 6 hours as needed for moderate pain 21 tablet 0    meclizine (ANTIVERT) 25 MG tablet Take 1 tablet (25 mg) by mouth 3 times daily as needed for dizziness 20 tablet 0    ondansetron (ZOFRAN ODT) 4 MG ODT tab Take 1 tablet (4 mg) by mouth every 6 hours as needed for nausea 20 tablet 0     Allergies   Allergen Reactions    Sulfa Antibiotics Rash         Review of Systems  Constitutional, HEENT, cardiovascular, pulmonary, gi and gu systems are negative, except as otherwise noted.     Objective    Exam  /76 (BP Location: Left arm, Patient Position: Chair, Cuff Size: Adult Regular)   Pulse 86   Temp 97.9  F (36.6  C) (Temporal)   Resp 16   Ht 1.626 m (5' 4\")   Wt 57.4 kg (126 lb 7 oz)   LMP 02/01/2006 (Exact Date)   SpO2 100%   " "Breastfeeding No   BMI 21.70 kg/m     Estimated body mass index is 21.7 kg/m  as calculated from the following:    Height as of this encounter: 1.626 m (5' 4\").    Weight as of this encounter: 57.4 kg (126 lb 7 oz).    Physical Exam    GENERAL: alert and no distress  EYES: Eyes grossly normal to inspection, PERRL and conjunctivae and sclerae normal  HENT: ear canals and TM's normal, nose and mouth without ulcers or lesions  NECK: no adenopathy, no asymmetry, masses, or scars  RESP: lungs clear to auscultation - no rales, rhonchi or wheezes  CV: regular rate and rhythm, normal S1 S2, no S3 or S4, no murmur, click or rub, no peripheral edema  ABDOMEN: soft, nontender, no hepatosplenomegaly, no masses and bowel sounds normal  MS: no gross musculoskeletal defects noted, no edema  SKIN: no suspicious lesions or rashes  NEURO: Normal strength and tone, mentation intact and speech normal  PSYCH: mentation appears normal, affect normal/bright        Signed Electronically by: Robyn Chung PA-C      "

## 2025-04-14 ENCOUNTER — TELEPHONE (OUTPATIENT)
Dept: GASTROENTEROLOGY | Facility: CLINIC | Age: 65
End: 2025-04-14
Payer: COMMERCIAL

## 2025-04-14 NOTE — TELEPHONE ENCOUNTER
Pre visit planning completed.      Procedure details:    Patient scheduled for Colonoscopy on 5/1/25.     Approximate arrival time: 0730. Procedure time 0815.   *Ensure patient is aware that endoscopy team will be calling about 2 days prior to procedure date to confirm arrival time as this may change.     Facility location: [facility]; [address], [place] [address]. Check in location: 2nd Floor at Surgery desk.  *Disclaimer: Drivers are to check in with patient and stay on campus during procedure.     Sedation type: Conscious sedation     Pre op exam needed? No.    Indication for procedure: screening        Chart review:     Electronic implanted devices? No    Recent diagnosis of diverticulitis within the last 6 weeks? No      Medication review:    Diabetic? No    Anticoagulants? No    Weight loss medication/injectable? No GLP-1 medication per patient's medication list. Nursing to verify with pre-assessment call.    Other medication HOLDING recommendations:  N/A      Prep for procedure:     Bowel prep recommendation: Standard Miralax.   Due to: standard bowel prep    Procedure information and instructions sent via Ond[employer]         Corinne Kliber, RN  Endoscopy Procedure Pre Assessment   [phone] option 3

## 2025-04-14 NOTE — TELEPHONE ENCOUNTER
Pre assessment completed for upcoming procedure.   (Please see previous telephone encounter notes for complete details)    I called and spoke with patient       Procedure details:    Approximate time and facility location reviewed.   Patient is aware that endoscopy team will be calling about 2 days prior to confirm arrival time.    Designated  policy reviewed and that site requests drivers to check in and stay on campus. Instructed to have someone stay 6  hours post procedure.   *Disclaimer - please notify the [facility] RN GI staff with any  issues/concerns.    Medication review:    Medications reviewed. Please see supporting documentation below. Holding recommendations discussed (if applicable).       Prep for procedure:     Procedure prep instructions reviewed.        Any additional information needed:  Patient has concerns with the time of her arrival being so early. She was told that when she was scheduled she was told that she could possibly push back her arrival time to a later time. I warm transferred patient to scheduling to assist her with the procedure time.      Patient verbalized understanding and had no questions or concerns at this time.      Tere Diaz LPN  Endoscopy Procedure Pre Assessment   [phone] option 3

## 2025-04-15 NOTE — TELEPHONE ENCOUNTER
Rescheduled Colonoscopy  Due to  pt wanting a later procedure time     Pt previously completed PA 4/14/25.  Please reach out to pt to see if they have any questions regarding rescheduled procedure and/or how to prepare.    Please see TE 4/14/25 for further details and information from previously scheduled procedure.   Pre visit planning completed.      Procedure details:    Patient scheduled for Colonoscopy on 5/1/25.     Approximate arrival time: 1030. Procedure time 1115.   *Ensure patient is aware that endoscopy team will be calling about 2 days prior to procedure date to confirm arrival time as this may change.     Facility location: [facility] [place]; [address], [place] [address]. Check in location: 2nd Floor at Surgery desk.  *Disclaimer: Drivers are to check in with patient and stay on campus during procedure.     Sedation type: Conscious sedation     Pre op exam needed? No.    Indication for procedure:   Diagnosis   Special screening for malignant neoplasms, colon         Pre-Assessment was completed for previously scheduled procedure. (See documentation below).  No new medical events or medications since last review.            Robyn Stewart RN  Endoscopy Procedure Pre Assessment   [phone] option 3

## 2025-04-22 ENCOUNTER — ANCILLARY PROCEDURE (OUTPATIENT)
Dept: MAMMOGRAPHY | Facility: CLINIC | Age: 65
End: 2025-04-22
Attending: PHYSICIAN ASSISTANT
Payer: COMMERCIAL

## 2025-04-22 DIAGNOSIS — Z12.31 ENCOUNTER FOR SCREENING MAMMOGRAM FOR BREAST CANCER: ICD-10-CM

## 2025-04-22 PROCEDURE — 77067 SCR MAMMO BI INCL CAD: CPT | Mod: GC | Performed by: RADIOLOGY

## 2025-04-22 PROCEDURE — 77063 BREAST TOMOSYNTHESIS BI: CPT | Mod: GC | Performed by: RADIOLOGY

## 2025-04-29 ENCOUNTER — TELEPHONE (OUTPATIENT)
Dept: GASTROENTEROLOGY | Facility: CLINIC | Age: 65
End: 2025-04-29
Payer: COMMERCIAL

## 2025-05-01 ENCOUNTER — HOSPITAL ENCOUNTER (OUTPATIENT)
Facility: AMBULATORY SURGERY CENTER | Age: 65
Discharge: HOME | End: 2025-05-01
Attending: STUDENT IN AN ORGANIZED HEALTH CARE EDUCATION/TRAINING PROGRAM | Admitting: STUDENT IN AN ORGANIZED HEALTH CARE EDUCATION/TRAINING PROGRAM
Payer: COMMERCIAL

## 2025-05-01 VITALS
TEMPERATURE: 96.7 F | HEART RATE: 68 BPM | DIASTOLIC BLOOD PRESSURE: 77 MMHG | SYSTOLIC BLOOD PRESSURE: 126 MMHG | RESPIRATION RATE: 16 BRPM | OXYGEN SATURATION: 100 %

## 2025-05-01 LAB — COLONOSCOPY: NORMAL

## 2025-05-01 RX ORDER — NALOXONE HYDROCHLORIDE 0.4 MG/ML
0.4 INJECTION, SOLUTION INTRAMUSCULAR; INTRAVENOUS; SUBCUTANEOUS
Status: ACTIVE | OUTPATIENT
Start: 2025-05-01

## 2025-05-01 RX ORDER — DIPHENHYDRAMINE HYDROCHLORIDE 50 MG/ML
25-50 INJECTION, SOLUTION INTRAMUSCULAR; INTRAVENOUS
Status: ACTIVE | OUTPATIENT
Start: 2025-05-01

## 2025-05-01 RX ORDER — ATROPINE SULFATE 0.1 MG/ML
1 INJECTION INTRAVENOUS
Status: DISPENSED | OUTPATIENT
Start: 2025-05-01

## 2025-05-01 RX ORDER — PROCHLORPERAZINE MALEATE 5 MG/1
5 TABLET ORAL EVERY 6 HOURS PRN
Status: DISPENSED | OUTPATIENT
Start: 2025-05-01

## 2025-05-01 RX ORDER — ONDANSETRON 2 MG/ML
4 INJECTION INTRAMUSCULAR; INTRAVENOUS EVERY 6 HOURS PRN
Status: ACTIVE | OUTPATIENT
Start: 2025-05-01

## 2025-05-01 RX ORDER — FLUMAZENIL 0.1 MG/ML
0.2 INJECTION, SOLUTION INTRAVENOUS
Status: ACTIVE | OUTPATIENT
Start: 2025-05-01

## 2025-05-01 RX ORDER — FLUMAZENIL 0.1 MG/ML
0.2 INJECTION, SOLUTION INTRAVENOUS
Status: ACTIVE | OUTPATIENT
Start: 2025-05-01 | End: 2025-05-01

## 2025-05-01 RX ORDER — NALOXONE HYDROCHLORIDE 0.4 MG/ML
0.2 INJECTION, SOLUTION INTRAMUSCULAR; INTRAVENOUS; SUBCUTANEOUS
Status: ACTIVE | OUTPATIENT
Start: 2025-05-01

## 2025-05-01 RX ORDER — SIMETHICONE 40MG/0.6ML
133 SUSPENSION, DROPS(FINAL DOSAGE FORM)(ML) ORAL
Status: ACTIVE | OUTPATIENT
Start: 2025-05-01

## 2025-05-01 RX ORDER — ONDANSETRON 2 MG/ML
4 INJECTION INTRAMUSCULAR; INTRAVENOUS
Status: ACTIVE | OUTPATIENT
Start: 2025-05-01

## 2025-05-01 RX ORDER — LIDOCAINE 40 MG/G
CREAM TOPICAL
Status: DISPENSED | OUTPATIENT
Start: 2025-05-01

## 2025-05-01 RX ORDER — EPINEPHRINE 1 MG/ML
0.1 INJECTION, SOLUTION INTRAMUSCULAR; SUBCUTANEOUS
Status: ACTIVE | OUTPATIENT
Start: 2025-05-01

## 2025-05-01 RX ORDER — ONDANSETRON 4 MG/1
4 TABLET, ORALLY DISINTEGRATING ORAL EVERY 6 HOURS PRN
Status: ACTIVE | OUTPATIENT
Start: 2025-05-01

## 2025-05-01 RX ORDER — FENTANYL CITRATE 50 UG/ML
25-100 INJECTION, SOLUTION INTRAMUSCULAR; INTRAVENOUS EVERY 5 MIN PRN
Status: ACTIVE | OUTPATIENT
Start: 2025-05-01

## 2025-05-01 NOTE — H&P
Pre-Endoscopy History and Physical     Kandice Morton MRN# [id number]   YOB: 1960 Age: 65 year old     Date of Procedure: 05/01/25  Primary care provider: Robyn Chung  Type of Endoscopy: Colonoscopy  Reason for Procedure: Surveillance (Previous polyps)  Type of Anesthesia Anticipated: Moderate Sedation    HPI:    Kandice is a 65 year old female who will be undergoing the above procedure.      Prior colonoscopy: 2017    A history and physical has been performed, notable for none. The patient's medications and allergies have been reviewed. The risks and benefits of the procedure and the sedation options and risks were discussed with the patient.  All questions were answered and informed consent was obtained.      Symptoms:  Rectal bleeding: No  Irregular bowel habits: No  Abnormal weight loss: No  Changes in stool: No    She denies a personal or family history of anesthesia complications or bleeding disorders. Denies family history of CRC or IBD.    Allergies   Allergen Reactions    Sulfa Antibiotics Rash      Allergy to Latex: NO   Allergy to tape: NO   Intolerances: NO     Current Outpatient Medications   Medication Sig Dispense Refill    acetaminophen-codeine (TYLENOL #3) 300-30 MG per tablet Take 1-2 tablets by mouth every 4 hours as needed. 28 tablet 0    ASPIRIN NOT PRESCRIBED (INTENTIONAL) Antiplatelet medication not prescribed intentionally due to Hx of gastrointestinal or intracranial bleed 1 each 0    ibuprofen (ADVIL/MOTRIN) 600 MG tablet Take 1 tablet (600 mg) by mouth every 6 hours as needed for moderate pain 21 tablet 0     Current Facility-Administered Medications   Medication Dose Route Frequency Provider Last Rate Last Admin    atropine injection 1 mg  1 mg Intravenous Once PRN Amy Cramer MD        benzocaine 20% (HURRICAINE/TOPEX) 20 % spray 0.5 mL  1 spray Mouth/Throat Once PRN Amy Cramer MD        diphenhydrAMINE (BENADRYL) injection 25-50 mg  25-50 mg  Intravenous Once PRN Amy Cramer MD        EPINEPHrine (Anaphylaxis) (ADRENALIN) injection (vial) 0.1 mg  0.1 mg Submucosal Once PRN Amy Cramer MD        fentaNYL (PF) (SUBLIMAZE) injection  mcg   mcg Intravenous Q5 Min PRN Amy Cramer MD        flumazenil (ROMAZICON) injection 0.2 mg  0.2 mg Intravenous q1 min prn Amy Cramer MD        glucagon injection 0.5 mg  0.5 mg Intravenous Once PRN Amy Cramer MD        lidocaine (LMX4) kit   Topical Q1H PRN Amy Cramer MD        lidocaine 1 % 0.1-1 mL  0.1-1 mL Other Q1H PRN Amy Cramer MD        midazolam (VERSED) injection 0.5-2 mg  0.5-2 mg Intravenous Q4 Min PRN Amy Cramer MD        naloxone (NARCAN) injection 0.2 mg  0.2 mg Intravenous Q2 Min PRN Amy Cramer MD        Or    naloxone (NARCAN) injection 0.4 mg  0.4 mg Intravenous Q2 Min PRN Amy Cramer MD        Or    naloxone (NARCAN) injection 0.2 mg  0.2 mg Intramuscular Q2 Min PRN Amy Cramer MD        Or    naloxone (NARCAN) injection 0.4 mg  0.4 mg Intramuscular Q2 Min PRN Amy Cramer MD        ondansetron (ZOFRAN) injection 4 mg  4 mg Intravenous Once PRN Amy Cramer MD        simethicone (MYLICON) suspension 133 mg  133 mg Oral Once PRN Amy Cramer MD        sodium chloride (PF) 0.9% PF flush 3 mL  3 mL Intracatheter Q8H [facility] Amy Cramer MD        sodium chloride (PF) 0.9% PF flush 3 mL  3 mL Intracatheter q1 min prn Amy Cramer MD        sodium chloride (PF) 0.9% PF flush 3 mL  3 mL Intravenous q1 min prn Amy Cramer MD        sodium chloride 0.9% BOLUS 500 mL  500 mL Intravenous Once PRN Amy Cramer MD           Patient Active Problem List   Diagnosis    Tobacco use disorder    CARDIOVASCULAR SCREENING; LDL GOAL LESS THAN 130    Neck pain    History of colonic polyps        Past Medical History:   Diagnosis Date     "Genital herpes, unspecified     Subdural hemorrhage following injury, without mention of open intracranial wound, unspecified state of consciousness 1985    Tobacco use disorder     TRAUMATIC SUBDURAL HEMORRH 12/27/2006        Past Surgical History:   Procedure Laterality Date    COLONOSCOPY N/A 11/6/2018    Procedure: COMBINED COLONOSCOPY with polypectomy by [facility];  Surgeon: Benja Ventura MD;  Location:  GI    [facility] OPEN SKULL EVAC HEMATOMA, SUPRATENTORIAL, SUB/ EXTRADURAL  1985       Social History     Tobacco Use    Smoking status: Every Day     Current packs/day: 0.50     Average packs/day: 0.5 packs/day for 45.0 years (22.5 ttl pk-yrs)     Types: Cigarettes     Passive exposure: Never    Smokeless tobacco: Never   Substance Use Topics    Alcohol use: Yes     Comment: occasionally       Family History   Problem Relation Age of Onset    Diabetes Mother         Type II    Neurologic Disorder Mother         Migraines    Genitourinary Problems Mother         Colitis- ulcerative    Hypertension Father     Diabetes Father         Type II    Cancer Father         lung       REVIEW OF SYSTEMS:     5 point ROS negative except as noted above in HPI, including Gen., Resp., CV, GI &  system review.      PHYSICAL EXAM:   BP (!) 131/95   Temp (!) 96.7  F (35.9  C) (Temporal)   Resp 16   LMP 02/01/2006 (Exact Date)   SpO2 98%  Estimated body mass index is 21.7 kg/m  as calculated from the following:    Height as of 10/29/24: 1.626 m (5' 4\").    Weight as of 10/29/24: 57.4 kg (126 lb 7 oz).   All Vitals have been reviewed.    GENERAL APPEARANCE: healthy and alert  MENTAL STATUS: alert  AIRWAY EXAM: Mallampatti Class II (visualization of the soft palate, fauces, and uvula)  RESP: lungs clear to auscultation - no rales, rhonchi or wheezes  CV: regular rates and rhythm      IMPRESSION   ASA Class 1 - Healthy patient, no medical problems or 2 - Mild systemic disease        PLAN:     Plan for colonoscopy. We discussed the " risks, benefits and alternatives and the patient wished to proceed.    The above has been forwarded to the consulting provider.      YSOELIN KOWALSKI MD  [facility]  Phone: [phone]  Fax: [phone]  May 1, 2025

## 2025-05-05 LAB
PATH REPORT.COMMENTS IMP SPEC: NORMAL
PATH REPORT.COMMENTS IMP SPEC: NORMAL
PATH REPORT.FINAL DX SPEC: NORMAL
PATH REPORT.GROSS SPEC: NORMAL
PATH REPORT.MICROSCOPIC SPEC OTHER STN: NORMAL
PATH REPORT.RELEVANT HX SPEC: NORMAL
PHOTO IMAGE: NORMAL

## 2025-05-15 ENCOUNTER — RESULTS FOLLOW-UP (OUTPATIENT)
Dept: GASTROENTEROLOGY | Facility: CLINIC | Age: 65
End: 2025-05-15
Payer: COMMERCIAL

## 2025-05-15 PROBLEM — D12.6 COLON ADENOMAS: Status: ACTIVE | Noted: 2025-05-15

## 2025-05-22 ENCOUNTER — APPOINTMENT (OUTPATIENT)
Dept: CT IMAGING | Facility: CLINIC | Age: 65
End: 2025-05-22
Attending: EMERGENCY MEDICINE
Payer: COMMERCIAL

## 2025-05-22 ENCOUNTER — TELEPHONE (OUTPATIENT)
Dept: GASTROENTEROLOGY | Facility: CLINIC | Age: 65
End: 2025-05-22
Payer: COMMERCIAL

## 2025-05-22 ENCOUNTER — HOSPITAL ENCOUNTER (EMERGENCY)
Facility: CLINIC | Age: 65
Discharge: HOME | End: 2025-05-22
Attending: EMERGENCY MEDICINE
Payer: COMMERCIAL

## 2025-05-22 VITALS
RESPIRATION RATE: 14 BRPM | WEIGHT: 125 LBS | TEMPERATURE: 97.6 F | DIASTOLIC BLOOD PRESSURE: 93 MMHG | SYSTOLIC BLOOD PRESSURE: 168 MMHG | BODY MASS INDEX: 20.83 KG/M2 | HEART RATE: 68 BPM | OXYGEN SATURATION: 98 % | HEIGHT: 65 IN

## 2025-05-22 DIAGNOSIS — I70.0 AORTIC ATHEROSCLEROSIS: ICD-10-CM

## 2025-05-22 DIAGNOSIS — R10.84 GENERALIZED ABDOMINAL PAIN: ICD-10-CM

## 2025-05-22 DIAGNOSIS — K55.1 SUPERIOR MESENTERIC ARTERY STENOSIS: ICD-10-CM

## 2025-05-22 DIAGNOSIS — K62.5 RECTAL BLEEDING: ICD-10-CM

## 2025-05-22 LAB
ABO + RH BLD: NORMAL
ALBUMIN SERPL BCG-MCNC: 4.2 G/DL (ref 3.5–5.2)
ALP SERPL-CCNC: 88 U/L (ref 40–150)
ALT SERPL W P-5'-P-CCNC: 10 U/L (ref 0–50)
ANION GAP SERPL CALCULATED.3IONS-SCNC: 13 MMOL/L (ref 7–15)
AST SERPL W P-5'-P-CCNC: 19 U/L (ref 0–45)
ATRIAL RATE - MUSE: 67 BPM
BASOPHILS # BLD AUTO: 0.1 10E3/UL (ref 0–0.2)
BASOPHILS NFR BLD AUTO: 1 %
BILIRUB SERPL-MCNC: 1.3 MG/DL
BLD GP AB SCN SERPL QL: NEGATIVE
BUN SERPL-MCNC: 6.3 MG/DL (ref 8–23)
CALCIUM SERPL-MCNC: 9.6 MG/DL (ref 8.8–10.4)
CHLORIDE SERPL-SCNC: 105 MMOL/L (ref 98–107)
CREAT SERPL-MCNC: 0.66 MG/DL (ref 0.51–0.95)
DIASTOLIC BLOOD PRESSURE - MUSE: NORMAL MMHG
EGFRCR SERPLBLD CKD-EPI 2021: >90 ML/MIN/1.73M2
EOSINOPHIL # BLD AUTO: 0.2 10E3/UL (ref 0–0.7)
EOSINOPHIL NFR BLD AUTO: 2 %
ERYTHROCYTE [DISTWIDTH] IN BLOOD BY AUTOMATED COUNT: 13.5 % (ref 10–15)
GLUCOSE SERPL-MCNC: 102 MG/DL (ref 70–99)
HCO3 SERPL-SCNC: 21 MMOL/L (ref 22–29)
HCT VFR BLD AUTO: 43 % (ref 35–47)
HEMOCCULT STL QL: NEGATIVE
HGB BLD-MCNC: 14.7 G/DL (ref 11.7–15.7)
HOLD SPECIMEN: NORMAL
IMM GRANULOCYTES # BLD: 0 10E3/UL
IMM GRANULOCYTES NFR BLD: 0 %
INTERPRETATION ECG - MUSE: NORMAL
LACTATE SERPL-SCNC: 0.8 MMOL/L (ref 0.7–2)
LIPASE SERPL-CCNC: 33 U/L (ref 13–60)
LYMPHOCYTES # BLD AUTO: 1.9 10E3/UL (ref 0.8–5.3)
LYMPHOCYTES NFR BLD AUTO: 20 %
MCH RBC QN AUTO: 31.3 PG (ref 26.5–33)
MCHC RBC AUTO-ENTMCNC: 34.2 G/DL (ref 31.5–36.5)
MCV RBC AUTO: 92 FL (ref 78–100)
MONOCYTES # BLD AUTO: 0.6 10E3/UL (ref 0–1.3)
MONOCYTES NFR BLD AUTO: 6 %
NEUTROPHILS # BLD AUTO: 6.6 10E3/UL (ref 1.6–8.3)
NEUTROPHILS NFR BLD AUTO: 71 %
NRBC # BLD AUTO: 0 10E3/UL
NRBC BLD AUTO-RTO: 0 /100
P AXIS - MUSE: 78 DEGREES
PLATELET # BLD AUTO: 327 10E3/UL (ref 150–450)
POTASSIUM SERPL-SCNC: 3.5 MMOL/L (ref 3.4–5.3)
PR INTERVAL - MUSE: 170 MS
PROT SERPL-MCNC: 6.8 G/DL (ref 6.4–8.3)
QRS DURATION - MUSE: 148 MS
QT - MUSE: 458 MS
QTC - MUSE: 483 MS
R AXIS - MUSE: 90 DEGREES
RBC # BLD AUTO: 4.69 10E6/UL (ref 3.8–5.2)
SODIUM SERPL-SCNC: 139 MMOL/L (ref 135–145)
SPECIMEN EXP DATE BLD: NORMAL
SYSTOLIC BLOOD PRESSURE - MUSE: NORMAL MMHG
T AXIS - MUSE: 70 DEGREES
VENTRICULAR RATE- MUSE: 67 BPM
WBC # BLD AUTO: 9.3 10E3/UL (ref 4–11)

## 2025-05-22 PROCEDURE — 83690 ASSAY OF LIPASE: CPT | Performed by: EMERGENCY MEDICINE

## 2025-05-22 PROCEDURE — 83605 ASSAY OF LACTIC ACID: CPT | Performed by: EMERGENCY MEDICINE

## 2025-05-22 PROCEDURE — 86850 RBC ANTIBODY SCREEN: CPT | Performed by: EMERGENCY MEDICINE

## 2025-05-22 PROCEDURE — 96360 HYDRATION IV INFUSION INIT: CPT | Mod: 59

## 2025-05-22 PROCEDURE — 250N000011 HC RX IP 250 OP 636: Performed by: EMERGENCY MEDICINE

## 2025-05-22 PROCEDURE — 250N000009 HC RX 250: Performed by: EMERGENCY MEDICINE

## 2025-05-22 PROCEDURE — 82272 OCCULT BLD FECES 1-3 TESTS: CPT | Performed by: EMERGENCY MEDICINE

## 2025-05-22 PROCEDURE — 258N000003 HC RX IP 258 OP 636: Performed by: EMERGENCY MEDICINE

## 2025-05-22 PROCEDURE — 36415 COLL VENOUS BLD VENIPUNCTURE: CPT | Performed by: EMERGENCY MEDICINE

## 2025-05-22 PROCEDURE — 74174 CTA ABD&PLVS W/CONTRAST: CPT

## 2025-05-22 PROCEDURE — 85004 AUTOMATED DIFF WBC COUNT: CPT | Performed by: EMERGENCY MEDICINE

## 2025-05-22 PROCEDURE — 93005 ELECTROCARDIOGRAM TRACING: CPT

## 2025-05-22 PROCEDURE — 99285 EMERGENCY DEPT VISIT HI MDM: CPT | Mod: 25

## 2025-05-22 PROCEDURE — 80053 COMPREHEN METABOLIC PANEL: CPT | Performed by: EMERGENCY MEDICINE

## 2025-05-22 RX ORDER — IOPAMIDOL 755 MG/ML
77 INJECTION, SOLUTION INTRAVASCULAR ONCE
Status: COMPLETED | OUTPATIENT
Start: 2025-05-22 | End: 2025-05-22

## 2025-05-22 RX ADMIN — IOPAMIDOL 77 ML: 755 INJECTION, SOLUTION INTRAVENOUS at 14:23

## 2025-05-22 RX ADMIN — SODIUM CHLORIDE 66 ML: 9 INJECTION, SOLUTION INTRAVENOUS at 14:23

## 2025-05-22 RX ADMIN — SODIUM CHLORIDE 1000 ML: 0.9 INJECTION, SOLUTION INTRAVENOUS at 13:46

## 2025-05-22 ASSESSMENT — ACTIVITIES OF DAILY LIVING (ADL)
ADLS_ACUITY_SCORE: 41

## 2025-05-22 ASSESSMENT — COLUMBIA-SUICIDE SEVERITY RATING SCALE - C-SSRS
2. HAVE YOU ACTUALLY HAD ANY THOUGHTS OF KILLING YOURSELF IN THE PAST MONTH?: NO
1. IN THE PAST MONTH, HAVE YOU WISHED YOU WERE DEAD OR WISHED YOU COULD GO TO SLEEP AND NOT WAKE UP?: NO
6. HAVE YOU EVER DONE ANYTHING, STARTED TO DO ANYTHING, OR PREPARED TO DO ANYTHING TO END YOUR LIFE?: NO

## 2025-05-22 NOTE — ED PROVIDER NOTES
"  Emergency Department Note      History of Present Illness     Chief Complaint   Abdominal Pain and Rectal Bleeding      HPI   Kandice Morton is a 65 year old female who presents to the Emergency Department for evaluation of abdominal pain and rectal bleeding. She reports that she had an episode of abdominal crampy pain with rectal pressure a week ago. The episode was isolated and did resolved later. She yesterday reports experiencing similar abdominal pain which she reports feels like \"6-7 contractions in 15 minutes\". She reports she felt like having to go to the bathroom but unable to pass any stools. She later states passing a little blood when she farted on a toilet seat. She also reports noticing a blood on toilet paper on wiping which prompted this visit. She had a colonoscopy 3 weeks ago and had 3 polyps removed. Colonoscopy was otherwise unremarkable as per patient. She reports lower back pain associated with abdominal pain. She denies any nausea, vomiting, fever or any other symptoms.     Independent Historian   None    Review of External Notes   I reviewed the outpatient H&P from: [facility] Dr. Gabriella Martinez on May 1, 2025.  Reviewed past medical history.  I reviewed the pathology from colonoscopy.  There were 4 biopsies including 3 polypectomies.  Negative for high-grade dysplasia.    Past Medical History     Medical History and Problem List   Tobacco use disorder   Subdural hemorrhage     Medications   Aspirin 81 mg   ibuprofen     Surgical History   Colonoscopy   Skull hematoma     Physical Exam     Patient Vitals for the past 24 hrs:   BP Temp Temp src Pulse Resp SpO2 Height Weight   05/22/25 1715 (!) 168/93 -- -- 68 -- 98 % -- --   05/22/25 1043 (!) 150/87 97.6  F (36.4  C) Oral 83 14 98 % 1.651 m (5' 5\") 56.7 kg (125 lb)     Physical Exam  General: Well-nourished,  appears to be resting comfortably when I enter the room  Eyes: PERRL, conjunctivae pink no scleral icterus or " conjunctival injection  ENT:  Moist mucus membranes, posterior oropharynx clear without erythema or exudates  Respiratory:  Lungs clear to auscultation bilaterally, no crackles/rubs/wheezes.  Good air movement  CV: Normal rate and rhythm, no murmurs/rubs/gallops  GI:  Abdomen soft and non-distended.  Normoactive BS.  No tenderness, guarding or rebound  Rectal.  No active bleeding.  No thrombosed hemorrhoids.  No palpable abscess.  No rectal fissure.  Normal tone.  Skin: Warm, dry.  No rashes or petechiae  Musculoskeletal: No peripheral edema or calf tenderness  Neuro: Alert and oriented to person/place/time  Psychiatric: Normal affect      Diagnostics     Lab Results   Labs Ordered and Resulted from Time of ED Arrival to Time of ED Departure   COMPREHENSIVE METABOLIC PANEL - Abnormal       Result Value    Sodium 139      Potassium 3.5      Carbon Dioxide (CO2) 21 (*)     Anion Gap 13      Urea Nitrogen 6.3 (*)     Creatinine 0.66      GFR Estimate >90      Calcium 9.6      Chloride 105      Glucose 102 (*)     Alkaline Phosphatase 88      AST 19      ALT 10      Protein Total 6.8      Albumin 4.2      Bilirubin Total 1.3 (*)    LIPASE - Normal    Lipase 33     OCCULT BLOOD STOOL - Normal    Occult Blood Negative     LACTIC ACID WHOLE BLOOD WITH 1X REPEAT IN 2 HR WHEN >2 - Normal    Lactic Acid, Initial 0.8     CBC WITH PLATELETS AND DIFFERENTIAL    WBC Count 9.3      RBC Count 4.69      Hemoglobin 14.7      Hematocrit 43.0      MCV 92      MCH 31.3      MCHC 34.2      RDW 13.5      Platelet Count 327      % Neutrophils 71      % Lymphocytes 20      % Monocytes 6      % Eosinophils 2      % Basophils 1      % Immature Granulocytes 0      NRBCs per 100 WBC 0      Absolute Neutrophils 6.6      Absolute Lymphocytes 1.9      Absolute Monocytes 0.6      Absolute Eosinophils 0.2      Absolute Basophils 0.1      Absolute Immature Granulocytes 0.0      Absolute NRBCs 0.0     TYPE AND SCREEN, ADULT    ABO/RH(D) A POS       Antibody Screen Negative      SPECIMEN EXPIRATION DATE [id number]     ABO/RH TYPE AND SCREEN       Imaging   CTA GI Bleed   Final Result   IMPRESSION:   1.  No acute abnormality in the abdomen or pelvis. Specifically, no evidence of active, intraluminal gastrointestinal hemorrhage.          EKG   ECG results from 05/22/25   EKG 12 lead     Value    Systolic Blood Pressure     Diastolic Blood Pressure     Ventricular Rate 67    Atrial Rate 67    AR Interval 170    QRS Duration 148        QTc 483    P Axis 78    R AXIS 90    T Axis 70    Interpretation ECG      Sinus rhythm  Right bundle branch block  When compared with ECG of 08-Jul-2024 16:02,  No significant change was found  Interepreted by Penelope Young MD at 1328        Independent Interpretation   None    ED Course      Medications Administered   Medications   sodium chloride 0.9% BOLUS 1,000 mL (0 mLs Intravenous Stopped 5/22/25 1446)   iopamidol (ISOVUE-370) solution 77 mL (77 mLs Intravenous $Given 5/22/25 1423)   sodium chloride 0.9 % bag for CT scan flush (66 mLs Intravenous $Given 5/22/25 1423)       Procedures   Procedures     Discussion of Management   Vascular surgery, Dr. Madan Castro    ED Course   ED Course as of 05/22/25 2016   Thu May 22, 2025   1302 I obtained the history and examined the patient as above.    1625 I rechecked and updated the patient.     1642 I spoke to Vascular surgery Dr. Madan Castro for this patient    1649 I consulted with Dr. Castro with vascular surgery.  Reviewed the CT findings and the presentation.  He recommended that we start the patient on baby aspirin and have her follow-up in the outpatient clinic since no signs of acute mesenteric ischemia but will need further evaluation for possible chronic SMA syndrome and concerns.       Additional Documentation  None    Medical Decision Making / Diagnosis     CMS Diagnoses: None    MIPS   None               MDM   Kandice Morton is a 65 year old female who comes with  episodic abdominal pain and 1 episode of rectal bleeding with straining.  On examination I do not see any active rectal bleeding and she is Hemoccult negative today.  There was no melena to suggest an upper GI bleed and symptoms seem to be lower GI in nature.  Hepatic, biliary and pancreatic enzymes were all normal.  Hemoglobin is actually normal.  No thrombocytopenia.  She is not in atrial fibrillation and lactic acid is normal so I doubt acute mesenteric ischemia.  CTA of the abdomen was obtained and showed no active GI bleeding.  No complication from the colonoscopy.  No other apparent acute abnormality.  It did show findings of aortic atherosclerosis as well as some stenosis secondary to atherosclerosis of the SMA.  Patient is a smoker and given this finding I was concerned about the possibility of testable angina and chronic mesenteric ischemia.  She does not definitely related to food but it does seem worse after eating.  She has also had episodes that did not seem related to eating.  I consulted with vascular surgery regarding this.  They recommended starting her on antiplatelet but given her otherwise well appearance and reassuring workup she can have this further evaluated as an outpatient.  I recommended that she stop smoking, start the low-dose aspirin.  Discussed reasons to return including ongoing or severe pain, recurrent large bleeding, black stools, fainting or feeling like significant fever or other worsening.    Disposition   The patient was discharged.     Diagnosis     ICD-10-CM    1. Generalized abdominal pain  R10.84       2. Superior mesenteric artery stenosis  K55.1       3. Aortic atherosclerosis  I70.0       4. Rectal bleeding  K62.5            Discharge Medications   Discharge Medication List as of 5/22/2025  5:13 PM          Scribe Disclosure:  Jose GR, am serving as a scribe at 12:57 PM on 5/22/2025 to document services personally performed by Penelope Young MD based on my  observations and the provider's statements to me.        Penelope Young MD  05/22/25 2019       Penelope Young MD  05/22/25 2019

## 2025-05-22 NOTE — DISCHARGE INSTRUCTIONS
*You may resume diet and activities. Recommend that you stop smoking as this can lead to worsening athersclerosis and this may be the cause of our episodes of pain.  *Baby aspirin (81mg) by mouth daily.   *Follow-up with your doctor in the next 2 days.  Recommend follow-up with vascular surgery as soon as possible.  You can see Dr. Tran in clinic or any one of his partners.  *Return if you develop recurrent pain that does not resolve, black or persistently bloody stools or become worse in any way    Discharge Instructions  Abdominal Pain    Abdominal pain can be caused by many things. Your evaluation today does not show the exact cause for your pain. Your doctor today has decided that it is unlikely your pain is due to a life threatening problem, or a problem requiring surgery or hospital admission. Sometimes those problems cannot be found right away, so it is very important that you follow up as directed.  Sometimes only the changes which occur over time allow the cause of your pain to be found.    Return to the Emergency Department for a recheck in 8-12 hours if your pain continues.  If your pain gets worse, changes in location, or feels different, return to the Emergency Department right away.    ADULTS:  Return to the Emergency Department right away if:    You get an oral temperature above 102oF or as directed by your doctor.  You have blood in your stools (bright red or black, tarry stools).  You keep throwing up or can t drink liquids.  You see blood when you throw up.  You can t have a bowel movement or you can t pass gas.  Your stomach gets bloated or bigger.  Your skin or the whites of your eyes look yellow.  You faint.  You have bloody, frequent or painful urination.  You have new symptoms or anything that worries you.    CHILDREN:  Return to the Emergency Department right away if your child has any of the above-listed symptoms or the following:    Pushes your hand away or screams/cries when his/her  belly is touched.  You notice your child is very fussy or weak.  Your child is very tired and is too tired to eat or drink.  Your child is dehydrated.  Signs of dehydration can be:  Your infant has had no wet diapers in 4-5 hours.  Your older child has not passed urine in 6-8 hours.  Your infant or child starts to have dry mouth and lips, or no saliva or tears.    PREGNANT WOMEN:  Return to the Emergency Department right away if you have any of the above-listed symptoms or the following:    You have bleeding, leaking fluid or passing tissue from the vagina.  You have worse pain or cramping, or pain in your shoulder or back.  You have vomiting that will not stop.  You have painful or bloody urination.  You have a temperature of 100oF or more.  Your baby is not moving as much as usual.  You faint.  You get a bad headache with or without eye problems and abdominal pain.  You have a convulsion or seizure.  You have unusual discharge from your vagina and abdominal pain.    Abdominal pain is pretty common during pregnancy.  Your pain may or may not be related to your pregnancy. You should follow-up closely with your OB doctor so they can evaluate you and your baby.  Until you follow-up with your regular doctor, do the following:     Avoid sex and do not put anything in your vagina.  Drink clear fluids.  Only take medications approved by your doctor.    MORE INFORMATION:    Appendicitis:  A possible cause of abdominal pain in any person who still has their appendix is acute appendicitis. Appendicitis is often hard to diagnose.  Testing does not always rule out early appendicitis or other causes of abdominal pain. Close follow-up with your doctor and re-evaluations may be needed to figure out the reason for your abdominal pain.    Follow-up:  It is very important that you make an appointment with your clinic and go to the appointment.  If you do not follow-up with your primary doctor, it may result in missing an important  "development which could result in permanent injury or disability and/or lasting pain.  If there is any problem keeping your appointment, call your doctor or return to the Emergency Department.    Medications:  Take your medications as directed by your doctor today.  Before using over-the-counter medications, ask your doctor and make sure to take the medications as directed.  If you have any questions about medications, ask your doctor.    Diet:  Resume your normal diet as much as possible, but do not eat fried, fatty or spicy foods while you have pain.  Do not drink alcohol or have caffeine.  Do not smoke tobacco.    Probiotics: If you have been given an antibiotic, you may want to also take a probiotic pill or eat yogurt with live cultures. Probiotics have \"good bacteria\" to help your intestines stay healthy. Studies have shown that probiotics help prevent diarrhea and other intestine problems (including C. diff infection) when you take antibiotics. You can buy these without a prescription in the pharmacy section of the store.     If you were given a prescription for medicine here today, be sure to read all of the information (including the package insert) that comes with your prescription.  This will include important information about the medicine, its side effects, and any warnings that you need to know about.  The pharmacist who fills the prescription can provide more information and answer questions you may have about the medicine.  If you have questions or concerns that the pharmacist cannot address, please call or return to the Emergency Department.         Opioid Medication Information    Pain medications are among the most commonly prescribed medicines, so we are including this information for all our patients. If you did not receive pain medication or get a prescription for pain medicine, you can ignore it.     You may have been given a prescription for an opioid (narcotic) pain medicine and/or have " received a pain medicine while here in the Emergency Department. These medicines can make you drowsy or impaired. You must not drive, operate dangerous equipment, or engage in any other dangerous activities while taking these medications. If you drive while taking these medications, you could be arrested for DUI, or driving under the influence. Do not drink any alcohol while you are taking these medications.     Opioid pain medications can cause addiction. If you have a history of chemical dependency of any type, you are at a higher risk of becoming addicted to pain medications.  Only take these prescribed medications to treat your pain when all other options have been tried. Take it for as short a time and as few doses as possible. Store your pain pills in a secure place, as they are frequently stolen and provide a dangerous opportunity for children or visitors in your house to start abusing these powerful medications. We will not replace any lost or stolen medicine.  As soon as your pain is better, you should flush all your remaining medication.     Many prescription pain medications contain Tylenol  (acetaminophen), including Vicodin , Tylenol #3 , Norco , Lortab , and Percocet .  You should not take any extra pills of Tylenol  if you are using these prescription medications or you can get very sick.  Do not ever take more than 3000 mg of acetaminophen in any 24 hour period.    All opioids tend to cause constipation. Drink plenty of water and eat foods that have a lot of fiber, such as fruits, vegetables, prune juice, apple juice and high fiber cereal.  Take a laxative if you don t move your bowels at least every other day. Miralax , Milk of Magnesia, Colace , or Senna  can be used to keep you regular.      Remember that you can always come back to the Emergency Department if you are not able to see your regular doctor in the amount of time listed above, if you get any new symptoms, or if there is anything that  worries you.

## 2025-05-22 NOTE — TELEPHONE ENCOUNTER
Patient called soniyar DENIZ Menjivar to report she is having complications after a colonoscopy done on 5/1/25.     Cramping initially started 1 week ago Wed, but resolved also on Wed.    Now it restarted again yesterday AM through last night. Last night was worse and it felt like pregnancy contractions but around her abdomen and anus. She feels like she needs to strain a lot and bear down just like with labor pains. Almost went to ED last night due to the pain. Rates it 7/10 on the pain scale. Still having waves of cramping this AM. Advised patient to go to ED and to notify Dr. Cramer's group after she was stable and back home--not before leaving for the ED.    She noticed small amount of bleeding (about 1 tsp-1 tbs bloody last night at 2030 along with a little amount of bloody tissue. Also reports small amounts of blood on the toilet paper yesterday.    Denies fevers or rigid abdomen. Has been passing gas, stools, eating, and drinking relatively normally since 1 week after her procedure until yesterday.    Patient agrees to go to  ED for evaluation. Will notify Robyn Chung NP--patient's PCP and Dr. Cramer.

## 2025-05-22 NOTE — ED TRIAGE NOTES
Copied from phone triage note with pt permission:  Cramping initially started 1 week ago Wed, but resolved also on Wed.     Now it restarted again yesterday AM through last night. Last night was worse and it felt like pregnancy contractions but around her abdomen and anus. She feels like she needs to strain a lot and bear down just like with labor pains. Almost went to ED last night due to the pain. Rates it 7/10 on the pain scale. Still having waves of cramping this AM. Advised patient to go to ED and to notify Dr. Cramer's group after she was stable and back home--not before leaving for the ED.     She noticed small amount of bleeding (about 1 tsp-1 tbs bloody last night at 2030 along with a little amount of bloody tissue. Also reports small amounts of blood on the toilet paper yesterday.

## 2025-05-28 ENCOUNTER — OFFICE VISIT (OUTPATIENT)
Dept: FAMILY MEDICINE | Facility: CLINIC | Age: 65
End: 2025-05-28
Payer: COMMERCIAL

## 2025-05-28 VITALS
RESPIRATION RATE: 16 BRPM | HEIGHT: 65 IN | DIASTOLIC BLOOD PRESSURE: 70 MMHG | WEIGHT: 127 LBS | TEMPERATURE: 98.6 F | HEART RATE: 92 BPM | BODY MASS INDEX: 21.16 KG/M2 | OXYGEN SATURATION: 98 % | SYSTOLIC BLOOD PRESSURE: 100 MMHG

## 2025-05-28 DIAGNOSIS — Z87.891 PERSONAL HISTORY OF TOBACCO USE, PRESENTING HAZARDS TO HEALTH: ICD-10-CM

## 2025-05-28 DIAGNOSIS — F10.90 ALCOHOL USE DISORDER: ICD-10-CM

## 2025-05-28 DIAGNOSIS — I70.0 ATHEROSCLEROSIS OF ABDOMINAL AORTA: ICD-10-CM

## 2025-05-28 DIAGNOSIS — K62.5 RECTAL BLEEDING: ICD-10-CM

## 2025-05-28 DIAGNOSIS — K55.1 SUPERIOR MESENTERIC ARTERY STENOSIS: Primary | ICD-10-CM

## 2025-05-28 PROCEDURE — 1126F AMNT PAIN NOTED NONE PRSNT: CPT | Performed by: PHYSICIAN ASSISTANT

## 2025-05-28 PROCEDURE — G2211 COMPLEX E/M VISIT ADD ON: HCPCS | Performed by: PHYSICIAN ASSISTANT

## 2025-05-28 PROCEDURE — 3074F SYST BP LT 130 MM HG: CPT | Performed by: PHYSICIAN ASSISTANT

## 2025-05-28 PROCEDURE — 99214 OFFICE O/P EST MOD 30 MIN: CPT | Performed by: PHYSICIAN ASSISTANT

## 2025-05-28 PROCEDURE — 3078F DIAST BP <80 MM HG: CPT | Performed by: PHYSICIAN ASSISTANT

## 2025-05-28 RX ORDER — ROSUVASTATIN CALCIUM 10 MG/1
10 TABLET, COATED ORAL DAILY
Qty: 90 TABLET | Refills: 3 | Status: SHIPPED | OUTPATIENT
Start: 2025-05-28

## 2025-05-28 ASSESSMENT — PAIN SCALES - GENERAL: PAINLEVEL_OUTOF10: NO PAIN (0)

## 2025-05-28 NOTE — PROGRESS NOTES
Assessment & Plan     Superior mesenteric artery stenosis  Atherosclerosis of abdominal aorta  Ref to vascular surgery  She has not yest started aspirin 81mg. She will start today  Discussed atherosclerosis, cholesterol and arthrosclerotic arterial disease (in this case intestinal angina sx). She is agreeable to starting rosuvastatin. She ate [employer] prior to this visit and does not want to check a baseline cholesterol today. Discussed possible side effects. Avoiding grapefruit. Obtaining fasting lipids in 2-3mo.  Heart healthy eating habits discussed.   She is not ready to quit smoking. She understands it is a vascular RF.   If she has significant abd pain of same quality again will present back to the ER.   - rosuvastatin (CRESTOR) 10 MG tablet; Take 1 tablet (10 mg) by mouth daily.  - Vascular Surgery Referral; Future    Rectal bleeding  No further rectal bleeding sx.     Personal history of tobacco use, presenting hazards to health  Discussed smoking risks and urgency to quit. She is not ready to quit yet.   Discussed chantix and nicotine patches. She declines for now.     Alcohol use disorder  Counseled on alcohol use: maximum recommended daily /weekly guidelines for women/men and increased risks w/overuse like liver disease, increase cancer risks, fall risk, malabsorptive/nutritional deficiency, uler risk, etc. She does not feel it is an issue.       MED REC REQUIRED  Post Medication Reconciliation Status:       Follow Up: see above. Additionally patient was instructed to contact clinic for worsening symptoms, non-improvement in time frame discussed, and for questions regarding treatment plan.   For virtual visits, the patient was advised to be seen for in person evaluation if symptoms or condition are worsening or non-improvement as expected.   Shantel Fulton PA-C    Lilia Arias is a 65 year old, presenting for the following health issues:  ER F/U      5/28/2025     1:48 PM   Additional  "Questions   Roomed by BT   Accompanied by Self     HPI        ED/UC Followup:    Facility:  [facility]   Date of visit: 05/22/2025  Reason for visit: abdominal pain and rectal bleeding   Current Status: improved     05/01/25 had screening colonoscopy in [place]- had polyps removed.     05/14/25 - developed pain. Wondered if something she ate. Felt like contractions. Lasted the whole day hoping would be better the next day. Was better the next day.     05/21/25- pain returned again. Felt same as prior. Spent whole day in bathroom writhing in pain. Passed gas and some red blood and what looked like little but of tissue. Then later in the evening same thing happened and \"few tsps\" of blood. Went to bed, next morning more \"contractions\" and blood. Went to [facility] ER at [facility].    In the ER - hemoccult negative, labs done. CTA GI bleed scan - \"IMPRESSION: No acute abnormality in the abdomen or pelvis. Specifically, no evidence of active, intraluminal gastrointestinal hemorrhage.\"   However-  \"There is at least moderate narrowing at the origin of the superior mesenteric artery from calcified atherosclerosis\"     Pt is certain it was rectal bleeding.   Denies urinary sx.   Periods ended age 42. She feels certain this is NOT uterine bleeding.     This pain was not necessarily related to eating or post-prandial.    Baseline BM pattern- is variable. Has BM daily in the morning. Consistency of stool is variable. Does not usually have bleeding. No tarry stools.     Has not started the aspirin 81mg yet. She had a subdural hematoma due to a trauma ate age 25.     Since the ER visit 6 days ago- initially still achy but seems to be better now. Had 2 BMs today- once small nuggets and once soft.   No more bleeding with BMs.   No fevers.   No vomiting    Tobacco use- half pack per day.   Smoking cessation- during pregnancies cold turkey.     Last lipids 10/2023- , Trig 65, HDL 79, D 139  The 10-year ASCVD risk score " "(Monika STEVENS, et al., 2019) is: 5.9%    Values used to calculate the score:      Age: 65 years      Sex: Female      Is Non- : No      Diabetic: No      Tobacco smoker: Yes      Systolic Blood Pressure: 100 mmHg      Is BP treated: No      HDL Cholesterol: 79 mg/dL      Total Cholesterol: 231 mg/dL      Alcohol 4-5 beers a night. Works as a . Does not think her drinking is an issue. Not interested in reducing use.     She has since received 3 letters with variable dates for follow up colon cancer [facility].            Review of Systems  Constitutional, HEENT, cardiovascular, pulmonary, gi and gu systems are negative, except as otherwise noted.      Objective    /70   Pulse 92   Temp 98.6  F (37  C) (Temporal)   Resp 16   Ht 1.651 m (5' 5\")   Wt 57.6 kg (127 lb)   LMP 02/01/2006 (Exact Date)   SpO2 98%   BMI 21.13 kg/m    Body mass index is 21.13 kg/m .  Physical Exam   GENERAL: alert and no distress  EYES: Eyes grossly normal to inspection, PERRL and conjunctivae and sclerae normal  HENT: ear canals and TM's normal, nose and mouth without ulcers or lesions  NECK: no adenopathy, no asymmetry, masses, or scars  RESP: lungs clear to auscultation - no rales, rhonchi or wheezes  CV: regular rate and rhythm, normal S1 S2, no S3 or S4, no murmur, click or rub, no peripheral edema  ABDOMEN: +BS, soft, nontender, no hepatosplenomegaly, no masses and bowel sounds normal  MS: no gross musculoskeletal defects noted, no edema  NEURO: Normal strength and tone, mentation intact and speech normal  PSYCH: mentation appears normal, affect normal/bright            Signed Electronically by: Shantel Fulton PA-C    "

## 2025-07-14 ENCOUNTER — OFFICE VISIT (OUTPATIENT)
Dept: OTHER | Facility: CLINIC | Age: 65
End: 2025-07-14
Attending: SURGERY
Payer: COMMERCIAL

## 2025-07-14 VITALS — DIASTOLIC BLOOD PRESSURE: 83 MMHG | OXYGEN SATURATION: 97 % | SYSTOLIC BLOOD PRESSURE: 148 MMHG | HEART RATE: 81 BPM

## 2025-07-14 DIAGNOSIS — K55.1 SUPERIOR MESENTERIC ARTERY STENOSIS: Primary | ICD-10-CM

## 2025-07-14 PROCEDURE — 99203 OFFICE O/P NEW LOW 30 MIN: CPT | Performed by: SURGERY

## 2025-07-14 PROCEDURE — G0463 HOSPITAL OUTPT CLINIC VISIT: HCPCS | Performed by: SURGERY

## 2025-07-14 PROCEDURE — 3079F DIAST BP 80-89 MM HG: CPT | Performed by: SURGERY

## 2025-07-14 PROCEDURE — 3075F SYST BP GE 130 - 139MM HG: CPT | Performed by: SURGERY

## 2025-07-14 RX ORDER — ASPIRIN 81 MG/1
81 TABLET ORAL DAILY
COMMUNITY

## 2025-07-14 NOTE — PATIENT INSTRUCTIONS
Thank you for allowing [facility] to provide your medical care.      Please see below for the follow up instructions pertaining to your medical appointment today with Dr. Gaviria at the [facility].      Follow up plan:     No surgical intervention advised by Dr. Gaviria  Please reach out as needed.            Please call our office with any concerns or questions, [phone].

## 2025-07-14 NOTE — PROGRESS NOTES
I had the pleasure of seeing Kandice Morton in the vascular surgery clinic today.  She is a 65-year-old female who has been referred to us for a finding of superior mesenteric artery stenosis.  Patient was in the emergency department on 22 May 2025 when she presented with abdominal pain and rectal bleeding.  She also noted some blood in the stool.  She had also recently undergone a colonoscopy and polypectomy.    At that time she underwent a CT angiogram to look for a possible source of the gastrointestinal bleed.  She was found to have mild stenosis of the superior mesenteric artery.    Patient denies weight loss or postprandial abdominal pain.  She smokes half a pack of cigarettes per day.    CT angiogram shows mild stenosis of the origin and proximal superior mesenteric artery.    This is an incidental finding.  I have reassured the patient.  No further action from vascular surgery standpoint.  I have counseled her on smoking cessation.

## 2025-07-14 NOTE — PROGRESS NOTES
[facility]        Patient is here for a consult to discuss superior mesenteric artery stenosis    Pt is currently taking Aspirin and Statin.    BP (!) 148/83 (BP Location: Right arm, Patient Position: Chair, Cuff Size: Adult Regular)   Pulse 81   LMP 02/01/2006 (Exact Date)   SpO2 97%     The provider has been notified that the patient has no concerns.     Questions patient would like addressed today are: N/A.    Refills are needed: N/A    Has homecare services and agency name:  Martita Ruff MA

## (undated) RX ORDER — LIDOCAINE HYDROCHLORIDE 10 MG/ML
INJECTION, SOLUTION EPIDURAL; INFILTRATION; INTRACAUDAL; PERINEURAL
Status: DISPENSED
Start: 2018-11-06

## (undated) RX ORDER — FENTANYL CITRATE 50 UG/ML
INJECTION, SOLUTION INTRAMUSCULAR; INTRAVENOUS
Status: DISPENSED
Start: 2025-05-01

## (undated) RX ORDER — FENTANYL CITRATE 50 UG/ML
INJECTION, SOLUTION INTRAMUSCULAR; INTRAVENOUS
Status: DISPENSED
Start: 2017-03-08

## (undated) RX ORDER — LIDOCAINE HYDROCHLORIDE 10 MG/ML
INJECTION, SOLUTION EPIDURAL; INFILTRATION; INTRACAUDAL; PERINEURAL
Status: DISPENSED
Start: 2017-03-08